# Patient Record
Sex: MALE | Race: WHITE | Employment: FULL TIME | URBAN - METROPOLITAN AREA
[De-identification: names, ages, dates, MRNs, and addresses within clinical notes are randomized per-mention and may not be internally consistent; named-entity substitution may affect disease eponyms.]

---

## 2021-09-23 ENCOUNTER — OFFICE VISIT (OUTPATIENT)
Dept: PRIMARY CARE CLINIC | Age: 61
End: 2021-09-23
Payer: COMMERCIAL

## 2021-09-23 VITALS
OXYGEN SATURATION: 97 % | TEMPERATURE: 98.3 F | HEIGHT: 73 IN | BODY MASS INDEX: 22.53 KG/M2 | WEIGHT: 170 LBS | SYSTOLIC BLOOD PRESSURE: 164 MMHG | HEART RATE: 72 BPM | DIASTOLIC BLOOD PRESSURE: 94 MMHG

## 2021-09-23 DIAGNOSIS — E11.9 CONTROLLED TYPE 2 DIABETES MELLITUS WITHOUT COMPLICATION, WITHOUT LONG-TERM CURRENT USE OF INSULIN (HCC): ICD-10-CM

## 2021-09-23 DIAGNOSIS — Z00.00 PREVENTATIVE HEALTH CARE: ICD-10-CM

## 2021-09-23 DIAGNOSIS — M79.671 PAIN IN BOTH FEET: Primary | ICD-10-CM

## 2021-09-23 DIAGNOSIS — M79.672 PAIN IN BOTH FEET: Primary | ICD-10-CM

## 2021-09-23 DIAGNOSIS — Z12.5 PROSTATE CANCER SCREENING: ICD-10-CM

## 2021-09-23 DIAGNOSIS — E78.5 HYPERLIPIDEMIA, UNSPECIFIED HYPERLIPIDEMIA TYPE: ICD-10-CM

## 2021-09-23 PROCEDURE — 99203 OFFICE O/P NEW LOW 30 MIN: CPT | Performed by: INTERNAL MEDICINE

## 2021-09-23 RX ORDER — METFORMIN HYDROCHLORIDE 500 MG/1
500 TABLET, EXTENDED RELEASE ORAL 2 TIMES DAILY
Qty: 60 TABLET | Refills: 5 | Status: SHIPPED | OUTPATIENT
Start: 2021-09-23 | End: 2021-10-20

## 2021-09-23 RX ORDER — GABAPENTIN 100 MG/1
100 CAPSULE ORAL 3 TIMES DAILY
Qty: 90 CAPSULE | Refills: 2 | Status: SHIPPED | OUTPATIENT
Start: 2021-09-23 | End: 2022-01-04 | Stop reason: SDUPTHER

## 2021-09-23 SDOH — ECONOMIC STABILITY: FOOD INSECURITY: WITHIN THE PAST 12 MONTHS, THE FOOD YOU BOUGHT JUST DIDN'T LAST AND YOU DIDN'T HAVE MONEY TO GET MORE.: NEVER TRUE

## 2021-09-23 SDOH — ECONOMIC STABILITY: TRANSPORTATION INSECURITY
IN THE PAST 12 MONTHS, HAS THE LACK OF TRANSPORTATION KEPT YOU FROM MEDICAL APPOINTMENTS OR FROM GETTING MEDICATIONS?: NO

## 2021-09-23 SDOH — ECONOMIC STABILITY: FOOD INSECURITY: WITHIN THE PAST 12 MONTHS, YOU WORRIED THAT YOUR FOOD WOULD RUN OUT BEFORE YOU GOT MONEY TO BUY MORE.: NEVER TRUE

## 2021-09-23 SDOH — ECONOMIC STABILITY: TRANSPORTATION INSECURITY
IN THE PAST 12 MONTHS, HAS LACK OF TRANSPORTATION KEPT YOU FROM MEETINGS, WORK, OR FROM GETTING THINGS NEEDED FOR DAILY LIVING?: NO

## 2021-09-23 ASSESSMENT — PATIENT HEALTH QUESTIONNAIRE - PHQ9
2. FEELING DOWN, DEPRESSED OR HOPELESS: 0
SUM OF ALL RESPONSES TO PHQ QUESTIONS 1-9: 0
SUM OF ALL RESPONSES TO PHQ9 QUESTIONS 1 & 2: 0
1. LITTLE INTEREST OR PLEASURE IN DOING THINGS: 0
SUM OF ALL RESPONSES TO PHQ QUESTIONS 1-9: 0
SUM OF ALL RESPONSES TO PHQ QUESTIONS 1-9: 0

## 2021-09-23 ASSESSMENT — ENCOUNTER SYMPTOMS
BLURRED VISION: 0
TROUBLE SWALLOWING: 0
VOMITING: 0
CHOKING: 0
VOICE CHANGE: 0
SHORTNESS OF BREATH: 0
NAUSEA: 0
PHOTOPHOBIA: 0

## 2021-09-23 ASSESSMENT — SOCIAL DETERMINANTS OF HEALTH (SDOH): HOW HARD IS IT FOR YOU TO PAY FOR THE VERY BASICS LIKE FOOD, HOUSING, MEDICAL CARE, AND HEATING?: NOT HARD AT ALL

## 2021-09-23 NOTE — PROGRESS NOTES
Bon Clark 63 y.o. male presents today with   Chief Complaint   Patient presents with    New Patient    Diabetes    Peripheral Neuropathy     feet       Diabetes  He presents for his follow-up diabetic visit. He has type 2 diabetes mellitus. His disease course has been stable. Pertinent negatives for hypoglycemia include no confusion or tremors. Pertinent negatives for diabetes include no blurred vision, no chest pain, no fatigue and no foot ulcerations. Pertinent negatives for hypoglycemia complications include no blackouts. Symptoms are stable. Foot Pain   The pain is present in the left foot and right foot. This is a recurrent problem. The current episode started more than 1 year ago. The problem occurs daily. The problem has been waxing and waning. The quality of the pain is described as aching. The pain is at a severity of 4/10. The pain is moderate. Associated symptoms include numbness and stiffness. Pertinent negatives include no fever. on no meds x 5 years. No insurance. Past Medical History:   Diagnosis Date    Hypertension     Kidney stone     Neuropathy     Type 2 diabetes mellitus without complication ([facility] [address]      There are no problems to display for this patient.     Past Surgical History:   Procedure Laterality Date    ACHILLES TENDON SURGERY Right     KNEE SURGERY Left     TONSILLECTOMY      WRIST TENODESIS Left      Family History   Problem Relation Age of Onset    Heart Disease Mother     Heart Attack Mother      Social History     Socioeconomic History    Marital status:      Spouse name: None    Number of children: None    Years of education: None    Highest education level: None   Occupational History    None   Tobacco Use    Smoking status: Current Every Day Smoker     Types: Cigarettes    Smokeless tobacco: Never Used   Substance and Sexual Activity    Alcohol use: Yes     Comment: 3 beers per wk    Drug use: Yes     Types: Marijuana    Sexual activity: None   Other Topics Concern    None   Social History Narrative    None     Social Determinants of Health     Financial Resource Strain: Low Risk     Difficulty of Paying Living Expenses: Not hard at all   Food Insecurity: No Food Insecurity    Worried About Running Out of Food in the Last Year: Never true    Coty of Food in the Last Year: Never true   Transportation Needs: No Transportation Needs    Lack of Transportation (Medical): No    Lack of Transportation (Non-Medical): No   Physical Activity:     Days of Exercise per Week:     Minutes of Exercise per Session:    Stress:     Feeling of Stress :    Social Connections:     Frequency of Communication with Friends and Family:     Frequency of Social Gatherings with Friends and Family:     Attends Anabaptism Services:     Active Member of Clubs or Organizations:     Attends Club or Organization Meetings:     Marital Status:    Intimate Partner Violence:     Fear of Current or Ex-Partner:     Emotionally Abused:     Physically Abused:     Sexually Abused:      No Known Allergies    Review of Systems   Constitutional: Negative for fatigue and fever. HENT: Negative for trouble swallowing and voice change. Eyes: Negative for blurred vision, photophobia and visual disturbance. Respiratory: Negative for choking and shortness of breath. Cardiovascular: Negative for chest pain and palpitations. Gastrointestinal: Negative for nausea and vomiting. Genitourinary: Negative for decreased urine volume and urgency. Musculoskeletal: Positive for stiffness. Negative for arthralgias. Skin: Negative for rash. Neurological: Positive for numbness. Negative for tremors and syncope. Hematological: Does not bruise/bleed easily. Psychiatric/Behavioral: Negative for confusion and suicidal ideas.            Vitals:    09/23/21 1014   BP: (!) 164/94   Site: Left Upper Arm   Cuff Size: Large Adult   Pulse: 72   Temp: 98.3 °F (36.8 °C)   SpO2: 97% Weight: 170 lb (77.1 kg)   Height: 6' 1\" (1.854 m)       Physical Exam  Constitutional:       Appearance: He is well-developed. HENT:      Head: Normocephalic and atraumatic. Eyes:      Conjunctiva/sclera: Conjunctivae normal.      Pupils: Pupils are equal, round, and reactive to light. Cardiovascular:      Rate and Rhythm: Normal rate and regular rhythm. Pulmonary:      Effort: Pulmonary effort is normal. No respiratory distress. Breath sounds: No wheezing. Abdominal:      General: There is no distension. Tenderness: There is no abdominal tenderness. Musculoskeletal:         General: Normal range of motion. Cervical back: Normal range of motion. Skin:     General: Skin is dry. Neurological:      Mental Status: He is alert. Assessment/Plan  Romel was seen today for new patient, diabetes and peripheral neuropathy. Diagnoses and all orders for this visit:    Pain in both feet  -     CBC With Auto Differential; Future  -     Vitamin B12; Future  -     Jammie - Katelin Renteria DPM, Podiatry, [place]  -     gabapentin (NEURONTIN) 100 MG capsule; Take 1 capsule by mouth 3 times daily for 90 days. Controlled type 2 diabetes mellitus without complication, without long-term current use of insulin (HCC)  -     Comprehensive Metabolic Panel; Future  -     Hemoglobin A1C; Future  -     Microalbumin / Creatinine Urine Ratio; Future  -     metFORMIN (GLUCOPHAGE-XR) 500 MG extended release tablet; Take 1 tablet by mouth 2 times daily  -     Jai Mobley MD, Endocrinology, [facility]  -     Lipid, Fasting; Future  -     Comprehensive Metabolic Panel; Future  -     CBC With Auto Differential; Future  -     Hemoglobin A1C; Future  -     PSA Screening; Future    Hyperlipidemia, unspecified hyperlipidemia type  -     Lipid, Fasting; Future    Prostate cancer screening  -     PSA Screening;  Future        Return in about 3 months (around 12/23/2021).     Addie Lopez MD

## 2021-10-01 DIAGNOSIS — E78.2 MIXED HYPERLIPIDEMIA: Primary | ICD-10-CM

## 2021-10-01 DIAGNOSIS — E08.65 DIABETES MELLITUS DUE TO UNDERLYING CONDITION, UNCONTROLLED, WITH HYPERGLYCEMIA (HCC): ICD-10-CM

## 2021-10-01 RX ORDER — ATORVASTATIN CALCIUM 20 MG/1
20 TABLET, FILM COATED ORAL DAILY
Qty: 30 TABLET | Refills: 5 | Status: SHIPPED | OUTPATIENT
Start: 2021-10-01 | End: 2022-01-28

## 2021-10-20 ENCOUNTER — OFFICE VISIT (OUTPATIENT)
Dept: ENDOCRINOLOGY | Age: 61
End: 2021-10-20
Payer: COMMERCIAL

## 2021-10-20 VITALS
WEIGHT: 170 LBS | BODY MASS INDEX: 22.53 KG/M2 | SYSTOLIC BLOOD PRESSURE: 139 MMHG | HEART RATE: 55 BPM | HEIGHT: 73 IN | OXYGEN SATURATION: 97 % | DIASTOLIC BLOOD PRESSURE: 87 MMHG

## 2021-10-20 DIAGNOSIS — E78.5 HYPERLIPIDEMIA, UNSPECIFIED HYPERLIPIDEMIA TYPE: ICD-10-CM

## 2021-10-20 DIAGNOSIS — E11.42 DIABETIC POLYNEUROPATHY ASSOCIATED WITH TYPE 2 DIABETES MELLITUS (HCC): ICD-10-CM

## 2021-10-20 DIAGNOSIS — E11.69 TYPE 2 DIABETES MELLITUS WITH OTHER SPECIFIED COMPLICATION, WITH LONG-TERM CURRENT USE OF INSULIN (HCC): Primary | ICD-10-CM

## 2021-10-20 DIAGNOSIS — Z79.4 TYPE 2 DIABETES MELLITUS WITH OTHER SPECIFIED COMPLICATION, WITH LONG-TERM CURRENT USE OF INSULIN (HCC): Primary | ICD-10-CM

## 2021-10-20 LAB
CHP ED QC CHECK: NORMAL
GLUCOSE BLD-MCNC: 215 MG/DL

## 2021-10-20 PROCEDURE — 82962 GLUCOSE BLOOD TEST: CPT | Performed by: INTERNAL MEDICINE

## 2021-10-20 PROCEDURE — 99243 OFF/OP CNSLTJ NEW/EST LOW 30: CPT | Performed by: INTERNAL MEDICINE

## 2021-10-20 RX ORDER — LANCETS 33 GAUGE
EACH MISCELLANEOUS
Qty: 100 EACH | Refills: 5 | Status: SHIPPED | OUTPATIENT
Start: 2021-10-20 | End: 2021-12-14 | Stop reason: SDUPTHER

## 2021-10-20 RX ORDER — BLOOD SUGAR DIAGNOSTIC
1 STRIP MISCELLANEOUS 3 TIMES DAILY
Qty: 100 EACH | Refills: 3 | Status: SHIPPED | OUTPATIENT
Start: 2021-10-20 | End: 2022-02-24

## 2021-10-20 RX ORDER — BLOOD-GLUCOSE METER
EACH MISCELLANEOUS
Qty: 1 KIT | Refills: 0 | Status: SHIPPED | OUTPATIENT
Start: 2021-10-20

## 2021-10-20 RX ORDER — LISINOPRIL 5 MG/1
5 TABLET ORAL DAILY
Qty: 90 TABLET | Refills: 1 | Status: SHIPPED | OUTPATIENT
Start: 2021-10-20 | End: 2022-01-04 | Stop reason: SDUPTHER

## 2021-10-20 RX ORDER — LANCETS 30 GAUGE
1 EACH MISCELLANEOUS DAILY
Qty: 100 EACH | Refills: 3 | Status: CANCELLED | OUTPATIENT
Start: 2021-10-20

## 2021-10-20 RX ORDER — BLOOD-GLUCOSE METER
1 EACH MISCELLANEOUS DAILY
Qty: 1 KIT | Refills: 0 | Status: CANCELLED | OUTPATIENT
Start: 2021-10-20

## 2021-10-20 RX ORDER — ICOSAPENT ETHYL 1000 MG/1
2 CAPSULE ORAL 2 TIMES DAILY
Qty: 120 CAPSULE | Refills: 3 | Status: SHIPPED | OUTPATIENT
Start: 2021-10-20 | End: 2021-11-17 | Stop reason: SDUPTHER

## 2021-10-20 RX ORDER — INSULIN LISPRO 100 [IU]/ML
INJECTION, SUSPENSION SUBCUTANEOUS
Qty: 10 PEN | Refills: 3 | Status: SHIPPED | OUTPATIENT
Start: 2021-10-20 | End: 2021-11-17 | Stop reason: SDUPTHER

## 2021-10-20 NOTE — PROGRESS NOTES
10/20/2021    Assessment:       Diagnosis Orders   1. Type 2 diabetes mellitus with other specified complication, with long-term current use of insulin ([facility])  POCT Glucose    C-Peptide    Glutamic Acid Decarboxylase    Anti-Islet Cell Antibody    Lipid Panel    Basic Metabolic Panel   2. Diabetic polyneuropathy associated with type 2 diabetes mellitus ([facility] [address]     3. Hyperlipidemia, unspecified hyperlipidemia type           PLAN:     Orders Placed This Encounter   Procedures    C-Peptide     Standing Status:   Future     Standing Expiration Date:   10/20/2022    Glutamic Acid Decarboxylase     Standing Status:   Future     Standing Expiration Date:   10/20/2022    Anti-Islet Cell Antibody     Standing Status:   Future     Standing Expiration Date:   10/20/2022    Lipid Panel     Standing Status:   Future     Standing Expiration Date:   10/20/2022     Order Specific Question:   Is Patient Fasting?/# of Hours     Answer:   y    Basic Metabolic Panel     Standing Status:   Future     Standing Expiration Date:   10/20/2022    POCT Glucose     Medications Discontinued During This Encounter   Medication Reason    metFORMIN (GLUCOPHAGE-XR) 500 MG extended release tablet LIST CLEANUP    metFORMIN (GLUCOPHAGE) 1000 MG tablet Therapy completed       Start on 75/25 Humalog 14 units twice a day  2 weeks freestyle gladys  Discontinue Metformin  Add Vascepa to Lipitor  Discussed A1c goal of 7 or lower  More than 50% of 40 minutes spent patient education counseling  We will screen for type 1 diabetes  Thank you for the referral  Diabetes education provided today:    Continuous Glucose monitor. How it works and checks blood sugars every 5 min. for 4 days during our tests. Managing high and low sugar readings. Rotation of sites for subcutaneous medication injection.     Orders Placed This Encounter   Medications    insulin lispro protamine & lispro (HUMALOG MIX 75/25 KWIKPEN) (75-25) 100 UNIT per ML SUPN injection pen     Sig: 14 units twice day     Dispense:  10 pen     Refill:  3    blood glucose test strips (ONETOUCH VERIO) strip     Si each by In Vitro route 3 times daily As needed. Dispense:  100 each     Refill:  3    OneTouch Delica Lancets 71Y MISC     Sig: tid     Dispense:  100 each     Refill:  5    Blood Glucose Monitoring Suppl (Shiv Moreno) w/Device KIT     Sig: As directed     Dispense:  1 kit     Refill:  0    lisinopril (PRINIVIL;ZESTRIL) 5 MG tablet     Sig: Take 1 tablet by mouth daily     Dispense:  90 tablet     Refill:  1    Icosapent Ethyl (VASCEPA) 1 g CAPS capsule     Sig: Take 2 capsules by mouth 2 times daily     Dispense:  120 capsule     Refill:  3    Insulin Pen Needle 32G X 4 MM MISC     Si each by Does not apply route 2 times daily     Dispense:  100 each     Refill:  3           Orders Placed This Encounter   Procedures    POCT Glucose       Subjective:     Chief Complaint   Patient presents with    New Patient    Diabetes     Vitals:    10/20/21 1338   BP: 139/87   Pulse: 55   SpO2: 97%   Weight: 170 lb (77.1 kg)   Height: 6' 1\" (1.854 m)     Wt Readings from Last 3 Encounters:   10/20/21 170 lb (77.1 kg)   21 170 lb (77.1 kg)     BP Readings from Last 3 Encounters:   10/20/21 139/87   21 (!) 164/94     Patient referred here for uncontrolled diabetes has had diabetes for over 30 years in between was not taking medications for his diabetes due to insurance reasons blood sugars have been overall above 200 does have neuropathy in his feet history of hypercholesterolemia hypertriglyceridemia currently on Metformin is having GI side effects including diarrhea    Diabetes  He presents for his initial diabetic visit. He has type 2 diabetes mellitus. His disease course has been fluctuating. There are no hypoglycemic associated symptoms. Associated symptoms include fatigue, foot paresthesias, polydipsia, polyuria and weight loss.  Diabetic complications include peripheral neuropathy. Current diabetic treatment includes oral agent (monotherapy) (Metformin). His overall blood glucose range is >200 mg/dl. (Lab Results       Component                Value               Date                       LABA1C                   10.3 (H)            09/23/2021              ) An ACE inhibitor/angiotensin II receptor blocker is being taken. Hyperlipidemia  This is a new problem. The current episode started more than 1 year ago. The problem is uncontrolled. Recent lipid tests were reviewed and are variable. Current antihyperlipidemic treatment includes statins. Risk factors for coronary artery disease include diabetes mellitus and dyslipidemia.      Past Medical History:   Diagnosis Date    Hypertension     Kidney stone     Neuropathy     Type 2 diabetes mellitus without complication (HCC)      Past Surgical History:   Procedure Laterality Date    ACHILLES TENDON SURGERY Right     KNEE SURGERY Left     TONSILLECTOMY      WRIST TENODESIS Left      Social History     Socioeconomic History    Marital status:      Spouse name: Not on file    Number of children: Not on file    Years of education: Not on file    Highest education level: Not on file   Occupational History    Not on file   Tobacco Use    Smoking status: Former Smoker     Types: Cigarettes    Smokeless tobacco: Never Used   Substance and Sexual Activity    Alcohol use: Yes     Comment: 3 beers per wk    Drug use: Yes     Types: Marijuana    Sexual activity: Not on file   Other Topics Concern    Not on file   Social History Narrative    Not on file     Social Determinants of Health     Financial Resource Strain: Low Risk     Difficulty of Paying Living Expenses: Not hard at all   Food Insecurity: No Food Insecurity    Worried About Running Out of Food in the Last Year: Never true    Coty of Food in the Last Year: Never true   Transportation Needs: No Transportation Needs    Lack of Transportation (Medical): No    Lack of Transportation (Non-Medical): No   Physical Activity:     Days of Exercise per Week:     Minutes of Exercise per Session:    Stress:     Feeling of Stress :    Social Connections:     Frequency of Communication with Friends and Family:     Frequency of Social Gatherings with Friends and Family:     Attends Christianity Services:     Active Member of Clubs or Organizations:     Attends Club or Organization Meetings:     Marital Status:    Intimate Partner Violence:     Fear of Current or Ex-Partner:     Emotionally Abused:     Physically Abused:     Sexually Abused:      Family History   Problem Relation Age of Onset    Heart Disease Mother     Heart Attack Mother      No Known Allergies    Current Outpatient Medications:     atorvastatin (LIPITOR) 20 MG tablet, Take 1 tablet by mouth daily, Disp: 30 tablet, Rfl: 5    metFORMIN (GLUCOPHAGE) 1000 MG tablet, Take 1 tablet by mouth 2 times daily (with meals), Disp: 60 tablet, Rfl: 5    gabapentin (NEURONTIN) 100 MG capsule, Take 1 capsule by mouth 3 times daily for 90 days. , Disp: 90 capsule, Rfl: 2  Lab Results   Component Value Date     (L) 09/23/2021    K 4.3 09/23/2021    CL 97 09/23/2021    CO2 20 09/23/2021    BUN 11 09/23/2021    CREATININE 0.59 (L) 09/23/2021    GLUCOSE 215 10/20/2021    CALCIUM 9.3 09/23/2021    PROT 6.8 09/23/2021    LABALBU 4.6 09/23/2021    BILITOT 1.0 (H) 09/23/2021    ALKPHOS 93 09/23/2021    AST <5 09/23/2021    ALT <5 09/23/2021    LABGLOM >60.0 09/23/2021    GFRAA >60.0 09/23/2021    GLOB 2.2 (L) 09/23/2021     Lab Results   Component Value Date    WBC 5.9 09/23/2021    HGB 17.2 09/23/2021    HCT 48.4 09/23/2021    MCV 88.7 09/23/2021     09/23/2021     Lab Results   Component Value Date    LABA1C 10.3 (H) 09/23/2021     Lab Results   Component Value Date    CHOLFAST 285 (H) 09/23/2021    TRIGLYCFAST 1,580 (H) 09/23/2021    HDL 27 (L) 09/23/2021    LDLCALC see below 09/23/2021       Review of Systems   Constitutional: Positive for fatigue, unexpected weight change and weight loss. Cardiovascular: Negative. Gastrointestinal: Positive for diarrhea. Endocrine: Positive for polydipsia and polyuria. Musculoskeletal: Negative. Psychiatric/Behavioral: Negative. All other systems reviewed and are negative. Objective:   Physical Exam  Vitals reviewed. Constitutional:       General: He is not in acute distress. Appearance: Normal appearance. He is normal weight. HENT:      Head: Normocephalic and atraumatic. Hair is normal.      Right Ear: External ear normal.      Left Ear: External ear normal.      Nose: Nose normal.      Mouth/Throat:      Mouth: Mucous membranes are moist.   Eyes:      General: No scleral icterus. Right eye: No discharge. Left eye: No discharge. Extraocular Movements: Extraocular movements intact. Conjunctiva/sclera: Conjunctivae normal.   Neck:      Trachea: Trachea normal.   Cardiovascular:      Rate and Rhythm: Normal rate and regular rhythm. Pulses: Normal pulses. Heart sounds: Normal heart sounds. Pulmonary:      Effort: Pulmonary effort is normal.      Breath sounds: Normal breath sounds. No wheezing or rhonchi. Abdominal:      Palpations: Abdomen is soft. Musculoskeletal:         General: Normal range of motion. Cervical back: Normal range of motion and neck supple. Feet:    Skin:     General: Skin is warm. Neurological:      General: No focal deficit present. Mental Status: He is alert and oriented to person, place, and time.    Psychiatric:         Mood and Affect: Mood normal.         Behavior: Behavior normal.

## 2021-10-21 ENCOUNTER — TELEPHONE (OUTPATIENT)
Dept: ENDOCRINOLOGY | Age: 61
End: 2021-10-21

## 2021-10-21 RX ORDER — HUMAN INSULIN 100 [IU]/ML
INJECTION, SUSPENSION SUBCUTANEOUS
Qty: 10 PEN | Refills: 3 | Status: SHIPPED | OUTPATIENT
Start: 2021-10-21 | End: 2022-12-15 | Stop reason: SDUPTHER

## 2021-10-21 ASSESSMENT — ENCOUNTER SYMPTOMS: DIARRHEA: 1

## 2021-10-21 NOTE — TELEPHONE ENCOUNTER
Patient is calling because the Humalog is too expensive and he is requesting an alternative medication. Can we send a generic to his pharmacy? Thanks!

## 2021-10-29 ENCOUNTER — INITIAL CONSULT (OUTPATIENT)
Dept: PODIATRY | Age: 61
End: 2021-10-29
Payer: COMMERCIAL

## 2021-10-29 VITALS — BODY MASS INDEX: 21.87 KG/M2 | TEMPERATURE: 97.4 F | WEIGHT: 165 LBS | HEIGHT: 73 IN

## 2021-10-29 DIAGNOSIS — M79.671 PAIN IN BOTH FEET: Primary | ICD-10-CM

## 2021-10-29 DIAGNOSIS — M20.42 HAMMERTOE, BILATERAL: ICD-10-CM

## 2021-10-29 DIAGNOSIS — E11.42 DIABETIC POLYNEUROPATHY ASSOCIATED WITH TYPE 2 DIABETES MELLITUS (HCC): ICD-10-CM

## 2021-10-29 DIAGNOSIS — M20.5X9 ACQUIRED ADDUCTOVARUS ROTATION OF TOE, UNSPECIFIED LATERALITY: ICD-10-CM

## 2021-10-29 DIAGNOSIS — M20.41 HAMMERTOE, BILATERAL: ICD-10-CM

## 2021-10-29 DIAGNOSIS — M79.672 PAIN IN BOTH FEET: Primary | ICD-10-CM

## 2021-10-29 PROCEDURE — 99203 OFFICE O/P NEW LOW 30 MIN: CPT | Performed by: PODIATRIST

## 2021-10-29 ASSESSMENT — ENCOUNTER SYMPTOMS
BACK PAIN: 0
SHORTNESS OF BREATH: 0
VOMITING: 0
NAUSEA: 0

## 2021-10-29 NOTE — PROGRESS NOTES
SHAILESH/ [address]  [address]  Dept: [phone]  Loc: [phone]       Howard Martinez  (1960)    10/29/21    Annetta Bliss is 63 y.o. male who complains today of:    Chief Complaint   Patient presents with    Foot Pain     both feet    Diabetes       Howard Martinez is seen in consultation at the request of Beba Zhang MD for evaluation of foot pain. HPI: Patient presents for diabetic foot exam.  Patient complains of numbness, tingling, and burning sensations to the toes and odd sensations to the forefoot/ ball of the foot. Patient states he began having these symptoms approximately 1 year ago and they have worsened over time. Patient rates his current pain at 6/10. Treatment has included controlling his diabetes and gabapentin, currently he takes 100 mg 3 times per day. Patient states that he has not tried a topical neuropathy pain cream in the past.  Patient checks his blood glucose daily, he last checked it this morning it was 291 mg/dL. The patient's most recent hemoglobin A1c was 10.3%. Patient denies a history of diabetic foot ulceration. Patient denies symptoms consistent with intermittent claudication but he does complain of calf cramping at night, this happens approximately 4-5 times per week. Patient does not currently wear diabetic shoes. Review of Systems   Constitutional: Negative for chills and fever. HENT: Negative for hearing loss. Respiratory: Negative for shortness of breath. Cardiovascular: Negative for chest pain. Gastrointestinal: Negative for nausea and vomiting. Genitourinary: Negative for difficulty urinating. Musculoskeletal: Negative for back pain and gait problem. Skin: Negative for wound. Neurological: Positive for numbness. Hematological: Does not bruise/bleed easily.    Psychiatric/Behavioral: Negative for sleep disturbance. The patient is a diabetic. Endocrinologist: Dr. Eleazar Manrique   Date last seen: 10/20/2021    Allergies:  Patient has no known allergies. Current Outpatient Medications on File Prior to Visit   Medication Sig Dispense Refill    Insulin NPH Isophane & Regular (NOVOLIN 70/30 FLEXPEN) (70-30) 100 UNIT per ML injection pen 14 units twice daily 10 pen 3    insulin lispro protamine & lispro (HUMALOG MIX 75/25 KWIKPEN) (75-25) 100 UNIT per ML SUPN injection pen 14 units twice day 10 pen 3    blood glucose test strips (ONETOUCH VERIO) strip 1 each by In Vitro route 3 times daily As needed. 100 each 3    OneTouch Delica Lancets 03L MISC tid 100 each 5    Blood Glucose Monitoring Suppl (ONETOUCH VERIO) w/Device KIT As directed 1 kit 0    lisinopril (PRINIVIL;ZESTRIL) 5 MG tablet Take 1 tablet by mouth daily 90 tablet 1    Icosapent Ethyl (VASCEPA) 1 g CAPS capsule Take 2 capsules by mouth 2 times daily 120 capsule 3    Insulin Pen Needle 32G X 4 MM MISC 1 each by Does not apply route 2 times daily 100 each 3    atorvastatin (LIPITOR) 20 MG tablet Take 1 tablet by mouth daily 30 tablet 5    gabapentin (NEURONTIN) 100 MG capsule Take 1 capsule by mouth 3 times daily for 90 days. 90 capsule 2     No current facility-administered medications on file prior to visit.        Past Medical History:   Diagnosis Date    Hypertension     Kidney stone     Neuropathy     Type 2 diabetes mellitus without complication (HCC)      Past Surgical History:   Procedure Laterality Date    ACHILLES TENDON SURGERY Right     KNEE SURGERY Left     TONSILLECTOMY      WRIST TENODESIS Left      Social History     Socioeconomic History    Marital status:      Spouse name: Not on file    Number of children: Not on file    Years of education: Not on file    Highest education level: Not on file   Occupational History    Not on file   Tobacco Use    Smoking status: Former Smoker     Types: Cigarettes    Smokeless tobacco: Never Used   Substance and Sexual Activity    Alcohol use: Yes     Comment: 3 beers per wk    Drug use: Yes     Types: Marijuana    Sexual activity: Not on file   Other Topics Concern    Not on file   Social History Narrative    Not on file     Social Determinants of Health     Financial Resource Strain: Low Risk     Difficulty of Paying Living Expenses: Not hard at all   Food Insecurity: No Food Insecurity    Worried About Running Out of Food in the Last Year: Never true    Coty of Food in the Last Year: Never true   Transportation Needs: No Transportation Needs    Lack of Transportation (Medical): No    Lack of Transportation (Non-Medical): No   Physical Activity:     Days of Exercise per Week:     Minutes of Exercise per Session:    Stress:     Feeling of Stress :    Social Connections:     Frequency of Communication with Friends and Family:     Frequency of Social Gatherings with Friends and Family:     Attends Sabianist Services:     Active Member of Clubs or Organizations:     Attends Club or Organization Meetings:     Marital Status:    Intimate Partner Violence:     Fear of Current or Ex-Partner:     Emotionally Abused:     Physically Abused:     Sexually Abused:      Family History   Problem Relation Age of Onset    Heart Disease Mother     Heart Attack Mother            Objective:   Vitals:  Temp 97.4 °F (36.3 °C)   Ht 6' 1\" (1.854 m)   Wt 165 lb (74.8 kg)   BMI 21.77 kg/m² Pain Score:   6      Physical Exam  Constitutional:       Appearance: Normal appearance. He is normal weight. HENT:      Head: Normocephalic and atraumatic. Cardiovascular:      Pulses:           Dorsalis pedis pulses are 2+ on the right side and 2+ on the left side. Posterior tibial pulses are 2+ on the right side and 2+ on the left side. Comments: Skin temperature gradient is noted to be warm to warm from proximal tibia to distal toes bilaterally.   Hair growth is noted bilateral lower extremity. No varicosities or telangiectasia noted bilaterally. No signs of ischemia or venous stasis observed. Pulmonary:      Effort: Pulmonary effort is normal.   Musculoskeletal:      Right lower leg: No edema. Left lower leg: No edema. Right foot: Deformity present. Left foot: Deformity present. Feet:      Right foot:      Protective Sensation: 10 sites tested. 10 sites sensed. Skin integrity: No ulcer or callus. Toenail Condition: Right toenails are normal.      Left foot:      Protective Sensation: 10 sites tested. 10 sites sensed. Skin integrity: No ulcer or callus. Toenail Condition: Left toenails are normal.      Comments: Rectus foot type noted bilaterally. Manual muscle testing is graded at 5/5 for all extrinsic foot muscle groups bilaterally. Adductovarus rotation deformity noted to the fifth toe bilaterally. Semirigid hammertoe deformity noted to the second PIPJ bilaterally. There is thickening of the distal one third of the right Achilles tendon, no dell or appreciable rupture noted. Decreased ankle joint dorsiflexion noted bilaterally, right worse than left, this improves with knee flexion. Lymphadenopathy:      Comments: Popliteal lymph nodes are soft and nontender bilateral lower extremity. Skin:     General: Skin is warm and dry. Capillary Refill: Capillary refill takes less than 2 seconds. Comments: No open lesions noted bilateral foot. Well-healed surgical cicatrix noted to the posterior right lower leg and heel. Neurological:      Mental Status: He is alert and oriented to person, place, and time. Motor: Motor function is intact. Coordination: Coordination is intact. Gait: Gait is intact. Deep Tendon Reflexes: Babinski sign absent on the right side. Babinski sign absent on the left side. Reflex Scores:       Patellar reflexes are 2+ on the right side and 2+ on the left side.        Achilles reflexes are 2+ on the right side and 2+ on the left side. Comments: Light touch sensation is intact but altered to the digits bilaterally. Protective sensation as tested with a 10 g semi-Alan monofilament was intact bilaterally. No ankle clonus noted bilaterally. Hot versus cold discrimination is intact bilaterally. Sharp versus dull discrimination is intact bilaterally. Psychiatric:         Mood and Affect: Mood normal.         Behavior: Behavior normal.         Assessment:      Diagnosis Orders   1. Pain in both feet     2. Diabetic polyneuropathy associated with type 2 diabetes mellitus ([facility] [address]     3. Hammertoe, bilateral     4. Acquired adductovarus rotation of toe, unspecified laterality         Plan:     Diabetes mellitus: I discussed the \"do's and donts\" of diabetes mellitus and diabetic foot care. The patient should adhere to the random glucose monitoring schedule according to their internist/endocrinologist and report any significant fluctuations or problems to them immediately. I emphasized the importance of daily foot checks and applying a moisturizing cream to the feet daily, but not in between the toes. If any ulcerations or signs and symptoms of infection arise, the patient is to call and return immediately for evaluation. Neuropathic pain: I have recommended over-the-counter topical pain creams (Voltaren and lidocaine), apply as directed. If these are ineffective I have recommended he try topical capsaicin. All questions were answered to the patient's satisfaction. Thank you for allowing me to participate in the care of your patient. Follow up:  Return in about 6 months (around 4/29/2022). Gaby Richardson DPM      Level of medical decision making: low. Please note that this report has been partially produced using speech recognition software which may cause errors including grammar, punctuation, and spelling or words and phrases that may seem inappropriate.  If there are questions or concerns please feel free to contact me for clarification.

## 2021-11-17 RX ORDER — ICOSAPENT ETHYL 1000 MG/1
2 CAPSULE ORAL 2 TIMES DAILY
Qty: 120 CAPSULE | Refills: 3 | Status: SHIPPED | OUTPATIENT
Start: 2021-11-17 | End: 2021-12-17 | Stop reason: SDUPTHER

## 2021-11-17 RX ORDER — INSULIN LISPRO 100 [IU]/ML
INJECTION, SUSPENSION SUBCUTANEOUS
Qty: 10 PEN | Refills: 3 | Status: SHIPPED | OUTPATIENT
Start: 2021-11-17

## 2021-12-10 RX ORDER — INSULIN HUMAN 100 [IU]/ML
INJECTION, SUSPENSION SUBCUTANEOUS
Qty: 10 PEN | Refills: 3 | Status: SHIPPED | OUTPATIENT
Start: 2021-12-10

## 2021-12-14 RX ORDER — VITAMIN E ACET./SAFFLOWER OIL
OIL (ML) TOPICAL
Refills: 0 | Status: CANCELLED | OUTPATIENT
Start: 2021-12-14

## 2021-12-14 RX ORDER — LANCETS 33 GAUGE
EACH MISCELLANEOUS
Qty: 100 EACH | Refills: 5 | Status: SHIPPED | OUTPATIENT
Start: 2021-12-14

## 2021-12-14 RX ORDER — LANCETS 33 GAUGE
EACH MISCELLANEOUS
Qty: 100 EACH | Refills: 5 | Status: CANCELLED | OUTPATIENT
Start: 2021-12-14

## 2021-12-14 NOTE — TELEPHONE ENCOUNTER
Patient  requesting medication refill.  Please approve or deny this request.    Rx requested:  Requested Prescriptions     Pending Prescriptions Disp Refills    Insulin Pen Needle 32G X 4 MM MISC 100 each 3     Si each by Does not apply route 2 times daily    OneTouch Delica Lancets 74K MISC 100 each 5     Sig: tid    Vitamin E Beauty 21685 UNIT/52ML OIL  0     Sig: Apply topically         Last Office Visit:   10/20/2021      Next Visit Date:  Future Appointments   Date Time Provider Power Issa   2021  2:30 PM José Miguel Buck  [address]   3/24/2022  9:15 AM [facility] [place]   2022  9:00 AM ARLENE Bagley [facility] [place]

## 2021-12-14 NOTE — TELEPHONE ENCOUNTER
Patient need humlin vials 1000units that is what patient insurance will pay for. Insulin needles ordered to please.    [facility]

## 2021-12-17 ENCOUNTER — OFFICE VISIT (OUTPATIENT)
Dept: ENDOCRINOLOGY | Age: 61
End: 2021-12-17
Payer: COMMERCIAL

## 2021-12-17 VITALS
WEIGHT: 176.2 LBS | HEIGHT: 73 IN | BODY MASS INDEX: 23.35 KG/M2 | HEART RATE: 83 BPM | DIASTOLIC BLOOD PRESSURE: 96 MMHG | SYSTOLIC BLOOD PRESSURE: 148 MMHG | RESPIRATION RATE: 14 BRPM | OXYGEN SATURATION: 96 %

## 2021-12-17 DIAGNOSIS — E78.5 HYPERLIPIDEMIA, UNSPECIFIED HYPERLIPIDEMIA TYPE: ICD-10-CM

## 2021-12-17 DIAGNOSIS — Z79.4 TYPE 2 DIABETES MELLITUS WITH OTHER SPECIFIED COMPLICATION, WITH LONG-TERM CURRENT USE OF INSULIN (HCC): Primary | ICD-10-CM

## 2021-12-17 DIAGNOSIS — E11.69 TYPE 2 DIABETES MELLITUS WITH OTHER SPECIFIED COMPLICATION, WITH LONG-TERM CURRENT USE OF INSULIN (HCC): Primary | ICD-10-CM

## 2021-12-17 LAB
CHP ED QC CHECK: ABNORMAL
GLUCOSE BLD-MCNC: 297 MG/DL
HBA1C MFR BLD: 9.1 %

## 2021-12-17 PROCEDURE — 99214 OFFICE O/P EST MOD 30 MIN: CPT | Performed by: INTERNAL MEDICINE

## 2021-12-17 PROCEDURE — 82962 GLUCOSE BLOOD TEST: CPT | Performed by: INTERNAL MEDICINE

## 2021-12-17 PROCEDURE — 83036 HEMOGLOBIN GLYCOSYLATED A1C: CPT | Performed by: INTERNAL MEDICINE

## 2021-12-17 RX ORDER — PEN NEEDLE, DIABETIC 32 GX 1/6"
1 NEEDLE, DISPOSABLE MISCELLANEOUS DAILY
Qty: 100 EACH | Refills: 3 | Status: SHIPPED | OUTPATIENT
Start: 2021-12-17

## 2021-12-17 RX ORDER — HUMAN INSULIN 100 [IU]/ML
INJECTION, SUSPENSION SUBCUTANEOUS
Qty: 10 PEN | Refills: 3 | Status: SHIPPED | OUTPATIENT
Start: 2021-12-17 | End: 2022-01-13 | Stop reason: SDUPTHER

## 2021-12-17 RX ORDER — ICOSAPENT ETHYL 1000 MG/1
2 CAPSULE ORAL 2 TIMES DAILY
Qty: 120 CAPSULE | Refills: 3 | Status: SHIPPED | OUTPATIENT
Start: 2021-12-17

## 2021-12-17 ASSESSMENT — ENCOUNTER SYMPTOMS: EYES NEGATIVE: 1

## 2021-12-17 NOTE — PROGRESS NOTES
2021    Assessment:       Diagnosis Orders   1. Type 2 diabetes mellitus with other specified complication, with long-term current use of insulin (HCC)  POCT glycosylated hemoglobin (Hb A1C)    POCT Glucose    C-Peptide    Glutamic Acid Decarboxylase    Glucose, Fasting   2. Hyperlipidemia, unspecified hyperlipidemia type  Lipid Panel         PLAN:     Orders Placed This Encounter   Procedures    C-Peptide     Standing Status:   Future     Standing Expiration Date:   2022    Glutamic Acid Decarboxylase     Standing Status:   Future     Standing Expiration Date:   2022    Glucose, Fasting     Standing Status:   Future     Standing Expiration Date:   2022    Lipid Panel     Standing Status:   Future     Standing Expiration Date:   2022     Order Specific Question:   Is Patient Fasting?/# of Hours     Answer:   y    POCT glycosylated hemoglobin (Hb A1C)    POCT Glucose     Change insulin injections to 3 times daily 14 to 16 units discussed about pump therapy patient did talk to [employer] pump representative regarding pump continuous glucose monitoring A1c goal of 7 or lower  Repeat lipid panel triglyceride goal of less than 200  4050% of 30 minutes spent patient education counseling  Diabetes education provided today:    Nutrition as a mainstream of diabetes therapy. [place] about label reading. Insulin pumps, how they work and how they affect blood sugar levels. Continuous Glucose monitor. How it works and checks blood sugars every 5 min. for 4 days during our tests. Managing high and low sugar readings.     Orders Placed This Encounter   Medications    Icosapent Ethyl (VASCEPA) 1 g CAPS capsule     Sig: Take 2 capsules by mouth 2 times daily     Dispense:  120 capsule     Refill:  3    Insulin NPH Isophane & Regular (NOVOLIN 70/30 FLEXPEN RELION) (70-30) 100 UNIT per ML injection pen     Si-16 units at each meals     Dispense:  10 pen     Refill:  3    Insulin Pen Needle (NOVOFINE PLUS PEN NEEDLE) 32G X 4 MM MISC     Si each by Does not apply route daily     Dispense:  100 each     Refill:  3       Subjective:     Chief Complaint   Patient presents with    Diabetes    1 Month Follow-Up     There were no vitals filed for this visit. Wt Readings from Last 3 Encounters:   10/29/21 165 lb (74.8 kg)   10/20/21 170 lb (77.1 kg)   21 170 lb (77.1 kg)     BP Readings from Last 3 Encounters:   10/20/21 139/87   21 (!) 164/94     Follow-up on type 2 diabetes patient on Novolin 70/30 taking 14 to 16 units twice daily also had 2-week freestyle gladys reviewed blood sugars are still labile although has not been improving  Patient also concerned about high cost of insulin looking at other less expensive options    Diabetes  He presents for his follow-up diabetic visit. He has type 2 diabetes mellitus. His disease course has been fluctuating. There are no hypoglycemic complications. Symptoms are improving. There are no diabetic complications. Current diabetic treatment includes insulin injections. He is currently taking insulin pre-breakfast and pre-dinner. His overall blood glucose range is >200 mg/dl. (Reviewed 2-week freestyle gladys average blood sugar was 322  Target range was 1%  High was 14%  Very high was 85%    Lab Results       Component                Value               Date                       LABA1C                   9.1 (A)             2021              ) An ACE inhibitor/angiotensin II receptor blocker is being taken. Hyperlipidemia  This is a recurrent problem. The current episode started more than 1 year ago. The problem is uncontrolled. Exacerbating diseases include diabetes. Current antihyperlipidemic treatment includes statins (vascepa). Risk factors for coronary artery disease include diabetes mellitus and dyslipidemia.      Past Medical History:   Diagnosis Date    Hypertension     Kidney stone     Neuropathy     Type 2 diabetes mellitus without complication ([facility] [address]      Past Surgical History:   Procedure Laterality Date    ACHILLES TENDON SURGERY Right     KNEE SURGERY Left     TONSILLECTOMY      WRIST TENODESIS Left      Social History     Socioeconomic History    Marital status:      Spouse name: Not on file    Number of children: Not on file    Years of education: Not on file    Highest education level: Not on file   Occupational History    Not on file   Tobacco Use    Smoking status: Former Smoker     Types: Cigarettes    Smokeless tobacco: Never Used   Substance and Sexual Activity    Alcohol use: Yes     Comment: 3 beers per wk    Drug use: Yes     Types: Marijuana Maurita Handsome)    Sexual activity: Not on file   Other Topics Concern    Not on file   Social History Narrative    Not on file     Social Determinants of Health     Financial Resource Strain: Low Risk     Difficulty of Paying Living Expenses: Not hard at all   Food Insecurity: No Food Insecurity    Worried About Running Out of Food in the Last Year: Never true    Coty of Food in the Last Year: Never true   Transportation Needs: No Transportation Needs    Lack of Transportation (Medical): No    Lack of Transportation (Non-Medical):  No   Physical Activity:     Days of Exercise per Week: Not on file    Minutes of Exercise per Session: Not on file   Stress:     Feeling of Stress : Not on file   Social Connections:     Frequency of Communication with Friends and Family: Not on file    Frequency of Social Gatherings with Friends and Family: Not on file    Attends Gnosticist Services: Not on file    Active Member of Clubs or Organizations: Not on file    Attends Club or Organization Meetings: Not on file    Marital Status: Not on file   Intimate Partner Violence:     Fear of Current or Ex-Partner: Not on file    Emotionally Abused: Not on file    Physically Abused: Not on file    Sexually Abused: Not on file   Housing Stability:     Unable to Pay for Housing in the Last Year: Not on file    Number of Places Lived in the Last Year: Not on file    Unstable Housing in the Last Year: Not on file     Family History   Problem Relation Age of Onset    Heart Disease Mother     Heart Attack Mother      No Known Allergies    Current Outpatient Medications:     OneTouch Delica Lancets 73Q MISC, tid, Disp: 100 each, Rfl: 5    Insulin Pen Needle 32G X 4 MM MISC, 1 each by Does not apply route 2 times daily, Disp: 100 each, Rfl: 3    Insulin NPH Isophane & Regular (HUMULIN 70/30 KWIKPEN) (70-30) 100 UNIT per ML injection pen, 14 units twice daily E11.65, Disp: 10 pen, Rfl: 3    insulin lispro protamine & lispro (HUMALOG MIX 75/25 KWIKPEN) (75-25) 100 UNIT per ML SUPN injection pen, 14 units twice day, Disp: 10 pen, Rfl: 3    Icosapent Ethyl (VASCEPA) 1 g CAPS capsule, Take 2 capsules by mouth 2 times daily, Disp: 120 capsule, Rfl: 3    Insulin NPH Isophane & Regular (NOVOLIN 70/30 FLEXPEN) (70-30) 100 UNIT per ML injection pen, 14 units twice daily, Disp: 10 pen, Rfl: 3    blood glucose test strips (ONETOUCH VERIO) strip, 1 each by In Vitro route 3 times daily As needed. , Disp: 100 each, Rfl: 3    Blood Glucose Monitoring Suppl (ONETOUCH VERIO) w/Device KIT, As directed, Disp: 1 kit, Rfl: 0    lisinopril (PRINIVIL;ZESTRIL) 5 MG tablet, Take 1 tablet by mouth daily, Disp: 90 tablet, Rfl: 1    atorvastatin (LIPITOR) 20 MG tablet, Take 1 tablet by mouth daily, Disp: 30 tablet, Rfl: 5    gabapentin (NEURONTIN) 100 MG capsule, Take 1 capsule by mouth 3 times daily for 90 days. , Disp: 90 capsule, Rfl: 2  Lab Results   Component Value Date     (L) 09/23/2021    K 4.3 09/23/2021    CL 97 09/23/2021    CO2 20 09/23/2021    BUN 11 09/23/2021    CREATININE 0.59 (L) 09/23/2021    GLUCOSE 215 10/20/2021    CALCIUM 9.3 09/23/2021    PROT 6.8 09/23/2021    LABALBU 4.6 09/23/2021    BILITOT 1.0 (H) 09/23/2021    ALKPHOS 93 09/23/2021    AST <5 09/23/2021    ALT <5 09/23/2021 LABGLOM >60.0 09/23/2021    GFRAA >60.0 09/23/2021    GLOB 2.2 (L) 09/23/2021     Lab Results   Component Value Date    WBC 5.9 09/23/2021    HGB 17.2 09/23/2021    HCT 48.4 09/23/2021    MCV 88.7 09/23/2021     09/23/2021     Lab Results   Component Value Date    LABA1C 10.3 (H) 09/23/2021     Lab Results   Component Value Date    CHOLFAST 285 (H) 09/23/2021    TRIGLYCFAST 1,580 (H) 09/23/2021    HDL 27 (L) 09/23/2021    LDLCALC see below 09/23/2021     No results found for: TESTM  No results found for: TSH, TSHREFLEX, FT3, T4FREE  No results found for: TPOABS    Review of Systems   Eyes: Negative. Cardiovascular: Negative. All other systems reviewed and are negative. Objective:   Physical Exam  Vitals reviewed. Constitutional:       Appearance: Normal appearance. HENT:      Head: Normocephalic and atraumatic. Hair is normal.      Right Ear: External ear normal.      Left Ear: External ear normal.      Nose: Nose normal.   Eyes:      General: No scleral icterus. Right eye: No discharge. Left eye: No discharge. Extraocular Movements: Extraocular movements intact. Conjunctiva/sclera: Conjunctivae normal.   Neck:      Trachea: Trachea normal.   Cardiovascular:      Rate and Rhythm: Normal rate. Pulmonary:      Effort: Pulmonary effort is normal.   Musculoskeletal:         General: Normal range of motion. Cervical back: Normal range of motion and neck supple. Neurological:      General: No focal deficit present. Mental Status: He is alert and oriented to person, place, and time.    Psychiatric:         Mood and Affect: Mood normal.         Behavior: Behavior normal.

## 2021-12-20 ENCOUNTER — TELEPHONE (OUTPATIENT)
Dept: PRIMARY CARE CLINIC | Age: 61
End: 2021-12-20

## 2021-12-20 NOTE — TELEPHONE ENCOUNTER
He saw Dr. Teodoro Ann the podiatrist and the DR said he wants his gabapentin increased to 500 mg twice a day? His # is [phone].

## 2022-01-04 ENCOUNTER — OFFICE VISIT (OUTPATIENT)
Dept: PRIMARY CARE CLINIC | Age: 62
End: 2022-01-04
Payer: COMMERCIAL

## 2022-01-04 VITALS
HEART RATE: 97 BPM | OXYGEN SATURATION: 98 % | SYSTOLIC BLOOD PRESSURE: 138 MMHG | TEMPERATURE: 97.8 F | HEIGHT: 73 IN | BODY MASS INDEX: 23.86 KG/M2 | DIASTOLIC BLOOD PRESSURE: 80 MMHG | WEIGHT: 180 LBS

## 2022-01-04 DIAGNOSIS — M79.671 PAIN IN BOTH FEET: Primary | ICD-10-CM

## 2022-01-04 DIAGNOSIS — Z12.11 COLON CANCER SCREENING: ICD-10-CM

## 2022-01-04 DIAGNOSIS — E34.9 TESTOSTERONE DEFICIENCY: ICD-10-CM

## 2022-01-04 DIAGNOSIS — N52.9 ERECTILE DYSFUNCTION, UNSPECIFIED ERECTILE DYSFUNCTION TYPE: ICD-10-CM

## 2022-01-04 DIAGNOSIS — M79.672 PAIN IN BOTH FEET: Primary | ICD-10-CM

## 2022-01-04 DIAGNOSIS — I10 HYPERTENSION, UNSPECIFIED TYPE: ICD-10-CM

## 2022-01-04 PROCEDURE — 99214 OFFICE O/P EST MOD 30 MIN: CPT | Performed by: INTERNAL MEDICINE

## 2022-01-04 RX ORDER — LISINOPRIL 20 MG/1
20 TABLET ORAL DAILY
Qty: 90 TABLET | Refills: 3 | Status: SHIPPED | OUTPATIENT
Start: 2022-01-04

## 2022-01-04 RX ORDER — SILDENAFIL 100 MG/1
100 TABLET, FILM COATED ORAL DAILY PRN
Qty: 10 TABLET | Refills: 11 | Status: SHIPPED | OUTPATIENT
Start: 2022-01-04 | End: 2022-07-28 | Stop reason: SDUPTHER

## 2022-01-04 RX ORDER — GABAPENTIN 300 MG/1
300 CAPSULE ORAL 3 TIMES DAILY
Qty: 90 CAPSULE | Refills: 2 | Status: SHIPPED | OUTPATIENT
Start: 2022-01-04 | End: 2022-07-28 | Stop reason: SDUPTHER

## 2022-01-04 ASSESSMENT — ENCOUNTER SYMPTOMS
TROUBLE SWALLOWING: 0
VOMITING: 0
CHOKING: 0
PHOTOPHOBIA: 0
VOICE CHANGE: 0
SHORTNESS OF BREATH: 0
NAUSEA: 0

## 2022-01-04 NOTE — PROGRESS NOTES
Serina Granda 63 y.o. male presents today with   Chief Complaint   Patient presents with    Peripheral Neuropathy    Discuss Medications     gabapentin increase       Foot Pain   The pain is present in the left foot and right foot. This is a chronic problem. The current episode started more than 1 year ago. The problem occurs daily. The problem has been waxing and waning. The quality of the pain is described as aching. The pain is at a severity of 4/10. The pain is moderate. Pertinent negatives include no fever. The symptoms are aggravated by activity. Hypertension  This is a chronic problem. The current episode started more than 1 year ago. The problem has been waxing and waning since onset. The problem is controlled. Associated symptoms include anxiety. Pertinent negatives include no chest pain, palpitations or shortness of breath. Past Medical History:   Diagnosis Date    Hypertension     Kidney stone     Neuropathy     Type 2 diabetes mellitus without complication ([facility] [address]      There are no problems to display for this patient.     Past Surgical History:   Procedure Laterality Date    ACHILLES TENDON SURGERY Right     KNEE SURGERY Left     TONSILLECTOMY      WRIST TENODESIS Left      Family History   Problem Relation Age of Onset    Heart Disease Mother     Heart Attack Mother      Social History     Socioeconomic History    Marital status:      Spouse name: None    Number of children: None    Years of education: None    Highest education level: None   Occupational History    None   Tobacco Use    Smoking status: Never Smoker    Smokeless tobacco: Never Used   Substance and Sexual Activity    Alcohol use: Yes     Comment: 3 beers per wk    Drug use: Yes     Types: Marijuana Sloan Stovall)    Sexual activity: None   Other Topics Concern    None   Social History Narrative    None     Social Determinants of Health     Financial Resource Strain: Low Risk     Difficulty of Paying Living Expenses: Not hard at all   Food Insecurity: No Food Insecurity    Worried About [facility] in the Last Year: Never true    Coty of Food in the Last Year: Never true   Transportation Needs: No Transportation Needs    Lack of Transportation (Medical): No    Lack of Transportation (Non-Medical): No   Physical Activity:     Days of Exercise per Week: Not on file    Minutes of Exercise per Session: Not on file   Stress:     Feeling of Stress : Not on file   Social Connections:     Frequency of Communication with Friends and Family: Not on file    Frequency of Social Gatherings with Friends and Family: Not on file    Attends Evangelical Services: Not on file    Active Member of [address] [place] [address] or Organizations: Not on file    Attends Club or Organization Meetings: Not on file    Marital Status: Not on file   Intimate Partner Violence:     Fear of Current or Ex-Partner: Not on file    Emotionally Abused: Not on file    Physically Abused: Not on file    Sexually Abused: Not on file   Housing Stability:     Unable to Pay for Housing in the Last Year: Not on file    Number of [place] in the Last Year: Not on file    Unstable Housing in the Last Year: Not on file     No Known Allergies    Review of Systems   Constitutional: Negative for fatigue and fever. HENT: Negative for trouble swallowing and voice change. Eyes: Negative for photophobia and visual disturbance. Respiratory: Negative for choking and shortness of breath. Cardiovascular: Negative for chest pain and palpitations. Gastrointestinal: Negative for nausea and vomiting. Genitourinary: Negative for decreased urine volume and urgency. Erection problems   Musculoskeletal: Negative for arthralgias. Skin: Negative for rash. Neurological: Negative for tremors and syncope. Hematological: Does not bruise/bleed easily. Psychiatric/Behavioral: Negative for suicidal ideas.            Vitals:    01/04/22 1048   BP: 138/80 Site: Left Upper Arm   Cuff Size: Medium Adult   Pulse: 97   Temp: 97.8 °F (36.6 °C)   SpO2: 98%   Weight: 180 lb (81.6 kg)   Height: 6' 1\" (1.854 m)       Physical Exam  Constitutional:       Appearance: He is well-developed. HENT:      Head: Normocephalic and atraumatic. Eyes:      Conjunctiva/sclera: Conjunctivae normal.      Pupils: Pupils are equal, round, and reactive to light. Cardiovascular:      Rate and Rhythm: Normal rate and regular rhythm. Pulmonary:      Effort: Pulmonary effort is normal. No respiratory distress. Breath sounds: No wheezing. Abdominal:      General: There is no distension. Musculoskeletal:         General: Normal range of motion. Cervical back: Normal range of motion. Skin:     General: Skin is warm. Coloration: Skin is not jaundiced. Neurological:      Mental Status: He is alert. Cranial Nerves: No cranial nerve deficit. Psychiatric:         Mood and Affect: Mood normal.        Assessment/Plan  Romel was seen today for peripheral neuropathy and discuss medications. Diagnoses and all orders for this visit:    Pain in both feet  -     gabapentin (NEURONTIN) 300 MG capsule; Take 1 capsule by mouth 3 times daily for 90 days. Hypertension, unspecified type  -     lisinopril (PRINIVIL;ZESTRIL) 20 MG tablet; Take 1 tablet by mouth daily    Colon cancer screening  -     COLONOSCOPY (Screening); Future    Testosterone deficiency  -     Testosterone Free And Total Male; Future    Erectile dysfunction, unspecified erectile dysfunction type  -     sildenafil (VIAGRA) 100 MG tablet; Take 1 tablet by mouth daily as needed for Erectile Dysfunction    Controlled Substances Monitoring: Periodic Controlled Substance Monitoring: Possible medication side effects, risk of tolerance/dependence & alternative treatments discussed. ,No signs of potential drug abuse or diversion identified. ,Assessed functional status.  Servando Benson MD)        Return in about 1 year (around 1/4/2023), or if symptoms worsen or fail to improve.     Donnie Mckeon MD

## 2022-01-13 ENCOUNTER — OFFICE VISIT (OUTPATIENT)
Dept: ENDOCRINOLOGY | Age: 62
End: 2022-01-13
Payer: COMMERCIAL

## 2022-01-13 VITALS
RESPIRATION RATE: 14 BRPM | OXYGEN SATURATION: 97 % | BODY MASS INDEX: 23.99 KG/M2 | HEIGHT: 73 IN | WEIGHT: 181 LBS | SYSTOLIC BLOOD PRESSURE: 156 MMHG | HEART RATE: 78 BPM | DIASTOLIC BLOOD PRESSURE: 92 MMHG

## 2022-01-13 DIAGNOSIS — E11.69 TYPE 2 DIABETES MELLITUS WITH OTHER SPECIFIED COMPLICATION, WITH LONG-TERM CURRENT USE OF INSULIN (HCC): Primary | ICD-10-CM

## 2022-01-13 DIAGNOSIS — Z79.4 TYPE 2 DIABETES MELLITUS WITH OTHER SPECIFIED COMPLICATION, WITH LONG-TERM CURRENT USE OF INSULIN (HCC): Primary | ICD-10-CM

## 2022-01-13 LAB
CHP ED QC CHECK: ABNORMAL
GLUCOSE BLD-MCNC: 198 MG/DL

## 2022-01-13 PROCEDURE — 99213 OFFICE O/P EST LOW 20 MIN: CPT | Performed by: INTERNAL MEDICINE

## 2022-01-13 PROCEDURE — 82962 GLUCOSE BLOOD TEST: CPT | Performed by: INTERNAL MEDICINE

## 2022-01-13 RX ORDER — HUMAN INSULIN 100 [IU]/ML
INJECTION, SUSPENSION SUBCUTANEOUS
Qty: 10 PEN | Refills: 3
Start: 2022-01-13

## 2022-01-13 NOTE — PROGRESS NOTES
2022    Assessment:       Diagnosis Orders   1.  Type 2 diabetes mellitus with other specified complication, with long-term current use of insulin (HCC)  POCT Glucose         PLAN:     Orders Placed This Encounter   Procedures    Lipid Panel     Standing Status:   Future     Standing Expiration Date:   2023     Order Specific Question:   Is Patient Fasting?/# of Hours     Answer:   y    Basic Metabolic Panel     Standing Status:   Future     Standing Expiration Date:   2023    Hemoglobin A1C     Standing Status:   Future     Standing Expiration Date:   2023    C-Peptide     Standing Status:   Future     Standing Expiration Date:   2023    Glutamic Acid Decarboxylase     Standing Status:   Future     Standing Expiration Date:   2023    POCT Glucose     Change insulin dose to 20 units with each meals patient also to talk to [employer] representative regarding insulin pump A1c goal of seven  Repeat A1c lipid panel will also do a C-peptide insulin antibody follow-up in 2 months  Orders Placed This Encounter   Medications    Insulin NPH Isophane & Regular (NOVOLIN 70/30 FLEXPEN RELION) (70-30) 100 UNIT per ML injection pen     Si units at each meals     Dispense:  10 pen     Refill:  3         Orders Placed This Encounter   Procedures    POCT Glucose       Subjective:     Chief Complaint   Patient presents with    Diabetes    1 Month Follow-Up     Vitals:    22 1147   BP: (!) 156/92   Pulse: 78   Resp: 14   SpO2: 97%   Weight: 181 lb (82.1 kg)   Height: 6' 1\" (1.854 m)     Wt Readings from Last 3 Encounters:   22 181 lb (82.1 kg)   22 180 lb (81.6 kg)   21 176 lb 3.2 oz (79.9 kg)     BP Readings from Last 3 Encounters:   22 (!) 156/92   22 138/80   21 (!) 148/96     Follow-up on type 2 diabetes blood sugars are still staying mostly in the 2-300 range denies any hypoglycemia patient is scheduled to meet with [employer] present today true    Coty of Food in the Last Year: Never true   Transportation Needs: No Transportation Needs    Lack of Transportation (Medical): No    Lack of Transportation (Non-Medical): No   Physical Activity:     Days of Exercise per Week: Not on file    Minutes of Exercise per Session: Not on file   Stress:     Feeling of Stress : Not on file   Social Connections:     Frequency of Communication with Friends and Family: Not on file    Frequency of Social Gatherings with Friends and Family: Not on file    Attends Oriental orthodox Services: Not on file    Active Member of [address] [place] [address] [id number] or Organizations: Not on file    Attends Club or Organization Meetings: Not on file    Marital Status: Not on file   Intimate Partner Violence:     Fear of Current or Ex-Partner: Not on file    Emotionally Abused: Not on file    Physically Abused: Not on file    Sexually Abused: Not on file   Housing Stability:     Unable to Pay for Housing in the Last Year: Not on file    Number of [place] in the Last Year: Not on file    Unstable Housing in the Last Year: Not on file     Family History   Problem Relation Age of Onset    Heart Disease Mother     Heart Attack Mother      No Known Allergies    Current Outpatient Medications:     gabapentin (NEURONTIN) 300 MG capsule, Take 1 capsule by mouth 3 times daily for 90 days. , Disp: 90 capsule, Rfl: 2    lisinopril (PRINIVIL;ZESTRIL) 20 MG tablet, Take 1 tablet by mouth daily, Disp: 90 tablet, Rfl: 3    sildenafil (VIAGRA) 100 MG tablet, Take 1 tablet by mouth daily as needed for Erectile Dysfunction, Disp: 10 tablet, Rfl: 11    Icosapent Ethyl (VASCEPA) 1 g CAPS capsule, Take 2 capsules by mouth 2 times daily, Disp: 120 capsule, Rfl: 3    Insulin NPH Isophane & Regular (NOVOLIN 70/30 FLEXPEN RELION) (70-30) 100 UNIT per ML injection pen, 14-16 units at each meals, Disp: 10 pen, Rfl: 3    Insulin Pen Needle (NOVOFINE PLUS PEN NEEDLE) 32G X 4 MM MISC, 1 each by Does not apply route daily, Disp: 100 each, Rfl: 3    OneTouch Delica Lancets 27C MISC, tid, Disp: 100 each, Rfl: 5    Insulin Pen Needle 32G X 4 MM MISC, 1 each by Does not apply route 2 times daily, Disp: 100 each, Rfl: 3    Insulin NPH Isophane & Regular (HUMULIN 70/30 KWIKPEN) (70-30) 100 UNIT per ML injection pen, 14 units twice daily E11.65, Disp: 10 pen, Rfl: 3    insulin lispro protamine & lispro (HUMALOG MIX 75/25 KWIKPEN) (75-25) 100 UNIT per ML SUPN injection pen, 14 units twice day, Disp: 10 pen, Rfl: 3    Insulin NPH Isophane & Regular (NOVOLIN 70/30 FLEXPEN) (70-30) 100 UNIT per ML injection pen, 14 units twice daily, Disp: 10 pen, Rfl: 3    blood glucose test strips (ONETOUCH VERIO) strip, 1 each by In Vitro route 3 times daily As needed. , Disp: 100 each, Rfl: 3    Blood Glucose Monitoring Suppl (Delight Capcarlos) w/Device KIT, As directed, Disp: 1 kit, Rfl: 0    atorvastatin (LIPITOR) 20 MG tablet, Take 1 tablet by mouth daily, Disp: 30 tablet, Rfl: 5  Lab Results   Component Value Date     (L) 09/23/2021    K 4.3 09/23/2021    CL 97 09/23/2021    CO2 20 09/23/2021    BUN 11 09/23/2021    CREATININE 0.59 (L) 09/23/2021    GLUCOSE 198 (A) 01/13/2022    CALCIUM 9.3 09/23/2021    PROT 6.8 09/23/2021    LABALBU 4.6 09/23/2021    BILITOT 1.0 (H) 09/23/2021    ALKPHOS 93 09/23/2021    AST <5 09/23/2021    ALT <5 09/23/2021    LABGLOM >60.0 09/23/2021    GFRAA >60.0 09/23/2021    GLOB 2.2 (L) 09/23/2021     Lab Results   Component Value Date    WBC 5.9 09/23/2021    HGB 17.2 09/23/2021    HCT 48.4 09/23/2021    MCV 88.7 09/23/2021     09/23/2021     Lab Results   Component Value Date    LABA1C 9.1 (A) 12/17/2021    LABA1C 10.3 (H) 09/23/2021     Lab Results   Component Value Date    CHOLFAST 285 (H) 09/23/2021    TRIGLYCFAST 1,580 (H) 09/23/2021    HDL 27 (L) 09/23/2021    LDLCALC see below 09/23/2021       Review of Systems   Endocrine: Negative.     All other systems reviewed and are negative. Objective:   Physical Exam  Vitals reviewed. Constitutional:       Appearance: Normal appearance. HENT:      Head: Normocephalic and atraumatic. Hair is normal.      Right Ear: External ear normal.      Left Ear: External ear normal.      Nose: Nose normal.   Eyes:      General: No scleral icterus. Right eye: No discharge. Left eye: No discharge. Extraocular Movements: Extraocular movements intact. Conjunctiva/sclera: Conjunctivae normal.   Neck:      Trachea: Trachea normal.   Cardiovascular:      Rate and Rhythm: Normal rate. Pulmonary:      Effort: Pulmonary effort is normal.   Musculoskeletal:         General: Normal range of motion. Cervical back: Normal range of motion and neck supple. Neurological:      General: No focal deficit present. Mental Status: He is alert and oriented to person, place, and time.    Psychiatric:         Mood and Affect: Mood normal.         Behavior: Behavior normal.

## 2022-01-27 DIAGNOSIS — E78.2 MIXED HYPERLIPIDEMIA: ICD-10-CM

## 2022-01-28 RX ORDER — ATORVASTATIN CALCIUM 20 MG/1
TABLET, FILM COATED ORAL
Qty: 30 TABLET | Refills: 5 | Status: SHIPPED | OUTPATIENT
Start: 2022-01-28 | End: 2022-08-12

## 2022-02-24 DIAGNOSIS — E11.69 TYPE 2 DIABETES MELLITUS WITH OTHER SPECIFIED COMPLICATION, WITH LONG-TERM CURRENT USE OF INSULIN (HCC): Primary | ICD-10-CM

## 2022-02-24 DIAGNOSIS — Z79.4 TYPE 2 DIABETES MELLITUS WITH OTHER SPECIFIED COMPLICATION, WITH LONG-TERM CURRENT USE OF INSULIN (HCC): Primary | ICD-10-CM

## 2022-02-24 RX ORDER — BLOOD SUGAR DIAGNOSTIC
STRIP MISCELLANEOUS
Qty: 100 STRIP | Refills: 3 | Status: SHIPPED | OUTPATIENT
Start: 2022-02-24

## 2022-02-24 NOTE — TELEPHONE ENCOUNTER
Patient requesting medication refill. Please approve or deny this request.    Rx requested:  Requested Prescriptions     Pending Prescriptions Disp Refills    Jackie Meter strip [Pharmacy Med Name: ONE TOUCH VERIO TEST STRIP] 100 strip 3     Sig: TEST 3 TIMES DAILY AS NEEDED.          Last Office Visit:   1/13/2022      Next Visit Date:  Future Appointments   Date Time Provider Power Maegan   3/17/2022 11:00 AM LALA Humphrey MD [address] [place] [address]   3/24/2022  9:15 AM [facility] [place]   4/29/2022  9:00 AM ARLENE Rivera [facility] [place]

## 2022-04-20 DIAGNOSIS — M79.671 PAIN IN BOTH FEET: ICD-10-CM

## 2022-04-20 DIAGNOSIS — M79.672 PAIN IN BOTH FEET: ICD-10-CM

## 2022-04-20 RX ORDER — GABAPENTIN 300 MG/1
300 CAPSULE ORAL 3 TIMES DAILY
Qty: 90 CAPSULE | Refills: 2 | OUTPATIENT
Start: 2022-04-20 | End: 2022-07-19

## 2022-05-20 ENCOUNTER — TELEPHONE (OUTPATIENT)
Dept: GASTROENTEROLOGY | Age: 62
End: 2022-05-20

## 2022-05-20 DIAGNOSIS — Z12.11 COLON CANCER SCREENING: Primary | ICD-10-CM

## 2022-05-20 NOTE — TELEPHONE ENCOUNTER
Spoke to patient, agreeable to colonoscopy. Patient scheduled 8/22/22 at 9:30 am. Miralax Prep mailed to patient. Referral pended to PCP. Info faxed to Leodan Frazier.

## 2022-06-06 ENCOUNTER — TELEPHONE (OUTPATIENT)
Dept: PRIMARY CARE CLINIC | Age: 62
End: 2022-06-06

## 2022-06-16 ENCOUNTER — COMMUNITY OUTREACH (OUTPATIENT)
Dept: PRIMARY CARE CLINIC | Age: 62
End: 2022-06-16

## 2022-07-28 ENCOUNTER — OFFICE VISIT (OUTPATIENT)
Dept: PRIMARY CARE CLINIC | Age: 62
End: 2022-07-28
Payer: COMMERCIAL

## 2022-07-28 VITALS
SYSTOLIC BLOOD PRESSURE: 124 MMHG | HEART RATE: 83 BPM | WEIGHT: 183 LBS | HEIGHT: 73 IN | TEMPERATURE: 98.4 F | OXYGEN SATURATION: 97 % | DIASTOLIC BLOOD PRESSURE: 88 MMHG | BODY MASS INDEX: 24.25 KG/M2

## 2022-07-28 DIAGNOSIS — N52.9 ERECTILE DYSFUNCTION, UNSPECIFIED ERECTILE DYSFUNCTION TYPE: ICD-10-CM

## 2022-07-28 DIAGNOSIS — M79.672 PAIN IN BOTH FEET: ICD-10-CM

## 2022-07-28 DIAGNOSIS — M79.671 PAIN IN BOTH FEET: ICD-10-CM

## 2022-07-28 PROCEDURE — 99213 OFFICE O/P EST LOW 20 MIN: CPT | Performed by: INTERNAL MEDICINE

## 2022-07-28 RX ORDER — GABAPENTIN 400 MG/1
400 CAPSULE ORAL 3 TIMES DAILY
Qty: 90 CAPSULE | Refills: 2 | Status: SHIPPED | OUTPATIENT
Start: 2022-07-28 | End: 2022-10-26

## 2022-07-28 RX ORDER — SILDENAFIL 100 MG/1
100 TABLET, FILM COATED ORAL DAILY PRN
Qty: 30 TABLET | Refills: 3 | Status: SHIPPED | OUTPATIENT
Start: 2022-07-28 | End: 2023-07-28

## 2022-07-28 ASSESSMENT — ENCOUNTER SYMPTOMS
PHOTOPHOBIA: 0
VOICE CHANGE: 0
CHOKING: 0
SHORTNESS OF BREATH: 0
ABDOMINAL PAIN: 0
VOMITING: 0
NAUSEA: 0
TROUBLE SWALLOWING: 0

## 2022-07-28 ASSESSMENT — PATIENT HEALTH QUESTIONNAIRE - PHQ9
2. FEELING DOWN, DEPRESSED OR HOPELESS: 0
SUM OF ALL RESPONSES TO PHQ9 QUESTIONS 1 & 2: 0
SUM OF ALL RESPONSES TO PHQ QUESTIONS 1-9: 0
1. LITTLE INTEREST OR PLEASURE IN DOING THINGS: 0
SUM OF ALL RESPONSES TO PHQ QUESTIONS 1-9: 0

## 2022-07-28 NOTE — PROGRESS NOTES
Marlys Small 63 y.o. male presents today with   Chief Complaint   Patient presents with    Neurologic Problem     6 months      Medication Refill       Neurologic Problem  Primary symptoms comment: foot pain. This is a chronic problem. The current episode started more than 1 year ago. The neurological problem developed gradually. The problem has been waxing and waning since onset. Pertinent negatives include no abdominal pain, chest pain, fatigue, fever, nausea, palpitations, shortness of breath or vomiting. Past Medical History:   Diagnosis Date    Hypertension     Kidney stone     Neuropathy     Type 2 diabetes mellitus without complication ([facility] [address]      There are no problems to display for this patient. Past Surgical History:   Procedure Laterality Date    ACHILLES TENDON SURGERY Right     KNEE SURGERY Left     TONSILLECTOMY      WRIST TENODESIS Left      Family History   Problem Relation Age of Onset    Heart Disease Mother     Heart Attack Mother      Social History     Socioeconomic History    Marital status:      Spouse name: None    Number of children: None    Years of education: None    Highest education level: None   Tobacco Use    Smoking status: Never    Smokeless tobacco: Never   Substance and Sexual Activity    Alcohol use: Yes     Comment: 3 beers per wk    Drug use: Yes     Types: Marijuana Hardesty Coil)     Social Determinants of Health     Financial Resource Strain: Low Risk     Difficulty of Paying Living Expenses: Not hard at all   Food Insecurity: No Food Insecurity    Worried About Running Out of Food in the Last Year: Never true    Ran Out of Food in the Last Year: Never true   Transportation Needs: No Transportation Needs    Lack of Transportation (Medical): No    Lack of Transportation (Non-Medical): No     No Known Allergies    Review of Systems   Constitutional:  Negative for fatigue and fever. HENT:  Negative for trouble swallowing and voice change.     Eyes:  Negative for photophobia and visual disturbance. Respiratory:  Negative for choking and shortness of breath. Cardiovascular:  Negative for chest pain and palpitations. Gastrointestinal:  Negative for abdominal pain, nausea and vomiting. Genitourinary:  Negative for decreased urine volume, testicular pain and urgency. Skin:  Negative for rash. Neurological:  Positive for numbness. Negative for tremors. Hematological:  Does not bruise/bleed easily. Psychiatric/Behavioral:  Negative for suicidal ideas. Vitals:    07/28/22 1145   BP: 124/88   Site: Left Upper Arm   Cuff Size: Large Adult   Pulse: 83   Temp: 98.4 °F (36.9 °C)   SpO2: 97%   Weight: 183 lb (83 kg)   Height: 6' 1\" (1.854 m)       Physical Exam  Constitutional:       Appearance: He is well-developed. HENT:      Head: Normocephalic and atraumatic. Eyes:      Conjunctiva/sclera: Conjunctivae normal.   Cardiovascular:      Rate and Rhythm: Normal rate and regular rhythm. Pulmonary:      Effort: Pulmonary effort is normal. No respiratory distress. Breath sounds: No wheezing. Abdominal:      General: There is no distension. Musculoskeletal:         General: Normal range of motion. Cervical back: Normal range of motion. Skin:     General: Skin is warm. Coloration: Skin is not jaundiced. Neurological:      Mental Status: He is alert. Cranial Nerves: No cranial nerve deficit. Psychiatric:         Mood and Affect: Mood normal.      Assessment/Plan  Romel was seen today for neurologic problem and medication refill. Diagnoses and all orders for this visit:    Pain in both feet  -     gabapentin (NEURONTIN) 400 MG capsule; Take 1 capsule by mouth in the morning and 1 capsule at noon and 1 capsule before bedtime. Do all this for 90 days. Erectile dysfunction, unspecified erectile dysfunction type  -     sildenafil (VIAGRA) 100 MG tablet;  Take 1 tablet by mouth daily as needed for Erectile Dysfunction    Controlled Substances Monitoring: Periodic Controlled Substance Monitoring: Possible medication side effects, risk of tolerance/dependence & alternative treatments discussed., No signs of potential drug abuse or diversion identified. , Assessed functional status. Lorrin Kayser, MD)    Return in about 3 months (around 10/28/2022), or if symptoms worsen or fail to improve.     Lorrin Kayser, MD

## 2022-08-12 DIAGNOSIS — E78.2 MIXED HYPERLIPIDEMIA: ICD-10-CM

## 2022-08-12 RX ORDER — ATORVASTATIN CALCIUM 20 MG/1
TABLET, FILM COATED ORAL
Qty: 30 TABLET | Refills: 5 | Status: SHIPPED | OUTPATIENT
Start: 2022-08-12

## 2022-08-22 ENCOUNTER — ANESTHESIA EVENT (OUTPATIENT)
Dept: ENDOSCOPY | Age: 62
End: 2022-08-22
Payer: COMMERCIAL

## 2022-08-22 ENCOUNTER — ANESTHESIA (OUTPATIENT)
Dept: ENDOSCOPY | Age: 62
End: 2022-08-22
Payer: COMMERCIAL

## 2022-08-22 ENCOUNTER — HOSPITAL ENCOUNTER (OUTPATIENT)
Age: 62
Setting detail: OUTPATIENT SURGERY
Discharge: HOME | End: 2022-08-22
Attending: INTERNAL MEDICINE | Admitting: INTERNAL MEDICINE
Payer: COMMERCIAL

## 2022-08-22 VITALS
RESPIRATION RATE: 18 BRPM | SYSTOLIC BLOOD PRESSURE: 118 MMHG | TEMPERATURE: 97.8 F | HEART RATE: 66 BPM | WEIGHT: 180 LBS | OXYGEN SATURATION: 96 % | HEIGHT: 73 IN | DIASTOLIC BLOOD PRESSURE: 79 MMHG | BODY MASS INDEX: 23.86 KG/M2

## 2022-08-22 LAB
GLUCOSE BLD-MCNC: 291 MG/DL (ref 70–99)
PERFORMED ON: ABNORMAL

## 2022-08-22 PROCEDURE — 3609027000 HC COLONOSCOPY: Performed by: INTERNAL MEDICINE

## 2022-08-22 PROCEDURE — 3700000001 HC ADD 15 MINUTES (ANESTHESIA): Performed by: INTERNAL MEDICINE

## 2022-08-22 PROCEDURE — 6370000000 HC RX 637 (ALT 250 FOR IP): Performed by: INTERNAL MEDICINE

## 2022-08-22 PROCEDURE — 88305 TISSUE EXAM BY PATHOLOGIST: CPT

## 2022-08-22 PROCEDURE — 6360000002 HC RX W HCPCS: Performed by: REGISTERED NURSE

## 2022-08-22 PROCEDURE — 2580000003 HC RX 258: Performed by: INTERNAL MEDICINE

## 2022-08-22 PROCEDURE — 3700000000 HC ANESTHESIA ATTENDED CARE: Performed by: INTERNAL MEDICINE

## 2022-08-22 PROCEDURE — 2709999900 HC NON-CHARGEABLE SUPPLY: Performed by: INTERNAL MEDICINE

## 2022-08-22 PROCEDURE — 7100000010 HC PHASE II RECOVERY - FIRST 15 MIN: Performed by: INTERNAL MEDICINE

## 2022-08-22 PROCEDURE — 2580000003 HC RX 258

## 2022-08-22 PROCEDURE — 45385 COLONOSCOPY W/LESION REMOVAL: CPT | Performed by: INTERNAL MEDICINE

## 2022-08-22 PROCEDURE — 45380 COLONOSCOPY AND BIOPSY: CPT | Performed by: INTERNAL MEDICINE

## 2022-08-22 PROCEDURE — 7100000011 HC PHASE II RECOVERY - ADDTL 15 MIN: Performed by: INTERNAL MEDICINE

## 2022-08-22 RX ORDER — SODIUM CHLORIDE 9 MG/ML
INJECTION, SOLUTION INTRAVENOUS
Status: COMPLETED
Start: 2022-08-22 | End: 2022-08-22

## 2022-08-22 RX ORDER — OMEGA-3/DHA/EPA/FISH OIL 360-1200MG
CAPSULE,DELAYED RELEASE (ENTERIC COATED) ORAL
COMMUNITY

## 2022-08-22 RX ORDER — SODIUM CHLORIDE 9 MG/ML
INJECTION, SOLUTION INTRAVENOUS CONTINUOUS
Status: DISCONTINUED | OUTPATIENT
Start: 2022-08-22 | End: 2022-08-22 | Stop reason: HOSPADM

## 2022-08-22 RX ORDER — MAGNESIUM HYDROXIDE 1200 MG/15ML
LIQUID ORAL PRN
Status: DISCONTINUED | OUTPATIENT
Start: 2022-08-22 | End: 2022-08-22 | Stop reason: ALTCHOICE

## 2022-08-22 RX ORDER — PROPOFOL 10 MG/ML
INJECTION, EMULSION INTRAVENOUS PRN
Status: DISCONTINUED | OUTPATIENT
Start: 2022-08-22 | End: 2022-08-22 | Stop reason: SDUPTHER

## 2022-08-22 RX ORDER — SODIUM CHLORIDE 9 MG/ML
INJECTION, SOLUTION INTRAVENOUS CONTINUOUS
Status: CANCELLED | OUTPATIENT
Start: 2022-08-22

## 2022-08-22 RX ORDER — SIMETHICONE 20 MG/.3ML
EMULSION ORAL PRN
Status: DISCONTINUED | OUTPATIENT
Start: 2022-08-22 | End: 2022-08-22 | Stop reason: ALTCHOICE

## 2022-08-22 RX ADMIN — PROPOFOL 50 MG: 10 INJECTION, EMULSION INTRAVENOUS at 09:18

## 2022-08-22 RX ADMIN — PROPOFOL 50 MG: 10 INJECTION, EMULSION INTRAVENOUS at 09:20

## 2022-08-22 RX ADMIN — PROPOFOL 20 MG: 10 INJECTION, EMULSION INTRAVENOUS at 09:34

## 2022-08-22 RX ADMIN — PROPOFOL 100 MG: 10 INJECTION, EMULSION INTRAVENOUS at 09:12

## 2022-08-22 RX ADMIN — SODIUM CHLORIDE: 9 INJECTION, SOLUTION INTRAVENOUS at 09:06

## 2022-08-22 RX ADMIN — PROPOFOL 50 MG: 10 INJECTION, EMULSION INTRAVENOUS at 09:30

## 2022-08-22 RX ADMIN — PROPOFOL 50 MG: 10 INJECTION, EMULSION INTRAVENOUS at 09:16

## 2022-08-22 ASSESSMENT — PAIN - FUNCTIONAL ASSESSMENT: PAIN_FUNCTIONAL_ASSESSMENT: NONE - DENIES PAIN

## 2022-08-22 NOTE — H&P
Patient Name: Humza Pathak  : 1960  MRN: [id number]  DATE: 22      ENDOSCOPY  History and Physical    Procedure:    [] Diagnostic Colonoscopy       [x] Screening Colonoscopy  [] EGD      [] ERCP      [] EUS       [] Other    [x] Previous office notes/History and Physical reviewed from the patients chart. Please see EMR for further details of HPI. I have examined the patient's status immediately prior to the procedure and:      Indications/HPI:    []Abdominal Pain   []Cancer- GI/Lung  []Fhx of colon CA  []History of Polyps   []Claytons   []Melena  []Abnormal Imaging   []Dysphagia    []Persistent Pneumonia  []Anemia   []Food Impaction  []History of Polyps  []GI Bleed   []Pulmonary nodule/Mass  []Change in bowel habits  []Heartburn/Reflux  []Rectal Bleed (BRBPR)  []Chest Pain - Non Cardiac  []Heme (+) Stool  []Ulcers  []Constipation   []Hemoptysis   []Varices  []Diarrhea   []Hypoxemia  []Nausea/Vomiting   [x]Screening   []Crohns/Colitis  []Other:    Anesthesia:   [x] MAC [] Moderate Sedation   [] General   [] None     ROS: 12 pt Review of Symptoms was negative unless mentioned above    Medications:   Prior to Admission medications    Medication Sig Start Date End Date Taking? Authorizing Provider   Omega-3 Fatty Acids (EQL OMEGA 3 FISH OIL) 1000 MG CPDR Take by mouth   Yes Historical Provider, MD   atorvastatin (LIPITOR) 20 MG tablet TAKE 1 TABLET BY MOUTH EVERY DAY 22   Benitez Soto MD   gabapentin (NEURONTIN) 400 MG capsule Take 1 capsule by mouth in the morning and 1 capsule at noon and 1 capsule before bedtime. Do all this for 90 days.  7/28/22 10/26/22  Benitez Soto MD   sildenafil (VIAGRA) 100 MG tablet Take 1 tablet by mouth daily as needed for Erectile Dysfunction 22  Benitez Soto MD   [facility] VERIO strip TEST 3 TIMES DAILY AS NEEDED. 22   Adrian Schmitz MD   Insulin NPH Isophane & Regular (NOVOLIN 70/30 FLEXPEN RELION) (70-30) 100 UNIT per ML injection pen 20 units at each meals 1/13/22   David Mcdowell MD   lisinopril (PRINIVIL;ZESTRIL) 20 MG tablet Take 1 tablet by mouth daily 1/4/22   Sofia Mccallum MD   Icosapent Ethyl (VASCEPA) 1 g CAPS capsule Take 2 capsules by mouth 2 times daily  Patient not taking: Reported on 8/22/2022 12/17/21   David Mcdowell MD   Insulin Pen Needle (NOVOFINE PLUS PEN NEEDLE) 32G X 4 MM MISC 1 each by Does not apply route daily 12/17/21   David Mcdowell MD   OneTouch Delica Lancets 75A MISC tid 12/14/21   David Mcdowell MD   Insulin Pen Needle 32G X 4 MM MISC 1 each by Does not apply route 2 times daily 12/14/21   David Mcdowell MD   Insulin NPH Isophane & Regular (HUMULIN 70/30 KWIKPEN) (70-30) 100 UNIT per ML injection pen 14 units twice daily E11.65  Patient not taking: Reported on 7/28/2022 12/10/21   David Mcdowell MD   insulin lispro protamine & lispro (HUMALOG MIX 75/25 KWIKPEN) (75-25) 100 UNIT per ML SUPN injection pen 14 units twice day  Patient not taking: Reported on 7/28/2022 11/17/21   ABELARDO Plaza   Insulin NPH Isophane & Regular (NOVOLIN 70/30 FLEXPEN) (70-30) 100 UNIT per ML injection pen 14 units twice daily  Patient not taking: Reported on 7/28/2022 10/21/21   David Mcdowell MD   Blood Glucose Monitoring Suppl (Paul Reins) w/Device KIT As directed 10/20/21   David Mcdowell MD     Allergies: No Known Allergies   History of allergic reaction to anesthesia:  No  Past Medical History:  Past Medical History:   Diagnosis Date    Hypertension     Kidney stone     Neuropathy     Type 2 diabetes mellitus without complication ([address]      Past Surgical History:  Past Surgical History:   Procedure Laterality Date    ACHILLES TENDON SURGERY Right     KNEE SURGERY Left     TONSILLECTOMY      WRIST TENODESIS Left      Social History:  Social History     Tobacco Use    Smoking status: Never    Smokeless tobacco: Never   Vaping Use    Vaping Use: Never used   Substance Use Topics    Alcohol use: Yes     Comment: 3 beers per wk    Drug use: Yes     Types: Marijuana Skip Bowers)     Comment: week ago     Vital Signs:   Vitals:    08/22/22 0945   BP: (!) 73/50   Pulse: 62   Resp: 16   Temp:    SpO2: 93%       Physical Exam:  Cardiac:  [x]WNL []Comments:  Pulmonary:  [x]WNL []Comments:   Neuro/Mental Status:  [x]WNL []Comments:  Abdominal:  [x]WNL []Comments:  Other:   []WNL []Comments:    Informed Consent:  The risks and benefits of the procedure have been discussed with either the patient or if they cannot consent, their representative. Assessment:  Patient examined and appropriate for planned sedation and procedure. Plan:  Proceed with planned sedation and procedure as above. The patient was counseled at length about risks of rachel COVID-19 in the perioperative and any recovery window from the procedure. The patient was made aware that rachel COVID-19 may worsen their prognosis for recovery from their procedure and lend to a higher morbidity and-all mortality risk. The patient was given the option of postponing the procedure all material risks, benefits, and alternatives were discussed. The patient does wish to proceed with the procedure at this time.     Isaias Daniel MD  9:45 AM

## 2022-08-22 NOTE — ANESTHESIA PRE PROCEDURE
Department of Anesthesiology  Preprocedure Note       Name:  Verna Hernandez   Age:  63 y.o.  :  1960                                          MRN:  [id number]         Date:  2022      Surgeon: Frankie Rodríguez):  Bo Broderick MD    Procedure: Procedure(s):  COLORECTAL CANCER SCREENING, NOT HIGH RISK    Medications prior to admission:   Prior to Admission medications    Medication Sig Start Date End Date Taking? Authorizing Provider   atorvastatin (LIPITOR) 20 MG tablet TAKE 1 TABLET BY MOUTH EVERY DAY 22   Marco Farah MD   gabapentin (NEURONTIN) 400 MG capsule Take 1 capsule by mouth in the morning and 1 capsule at noon and 1 capsule before bedtime. Do all this for 90 days.  7/28/22 10/26/22  Marco Farah MD   sildenafil (VIAGRA) 100 MG tablet Take 1 tablet by mouth daily as needed for Erectile Dysfunction 22  Marco Farah MD   [facility] VERIO strip TEST 3 TIMES DAILY AS NEEDED. 22   Ju White MD   Insulin NPH Isophane & Regular (NOVOLIN 70/30 FLEXPEN RELION) (70-30) 100 UNIT per ML injection pen 20 units at each meals 22   Ju White MD   lisinopril (PRINIVIL;ZESTRIL) 20 MG tablet Take 1 tablet by mouth daily 22   Marco Farah MD   Icosapent Ethyl (VASCEPA) 1 g CAPS capsule Take 2 capsules by mouth 2 times daily 21   Ju White MD   Insulin Pen Needle (NOVOFINE PLUS PEN NEEDLE) 32G X 4 MM MISC 1 each by Does not apply route daily 21   Ju White MD   OneTouch Delica Lancets 40F MISC tid 21   Ju White MD   Insulin Pen Needle 32G X 4 MM MISC 1 each by Does not apply route 2 times daily 21   Ju White MD   Insulin NPH Isophane & Regular (HUMULIN 70/30 KWIKPEN) (70-30) 100 UNIT per ML injection pen 14 units twice daily E11.65  Patient not taking: Reported on 2022 12/10/21   Ju White MD   insulin lispro protamine & lispro (HUMALOG MIX 75/25 KWIKPEN) (75-25) 100 UNIT per ML SUPN injection pen 14 units twice day  Patient not taking: Reported on 7/28/2022 11/17/21   ABELARDO Ng   Insulin NPH Isophane & Regular (NOVOLIN 70/30 FLEXPEN) (70-30) 100 UNIT per ML injection pen 14 units twice daily  Patient not taking: Reported on 7/28/2022 10/21/21   Joanna Wright MD   Blood Glucose Monitoring Suppl (Shiv Moreno) w/Device KIT As directed 10/20/21   Joanna Wright MD       Current medications:    No current facility-administered medications for this encounter. Current Outpatient Medications   Medication Sig Dispense Refill    atorvastatin (LIPITOR) 20 MG tablet TAKE 1 TABLET BY MOUTH EVERY DAY 30 tablet 5    gabapentin (NEURONTIN) 400 MG capsule Take 1 capsule by mouth in the morning and 1 capsule at noon and 1 capsule before bedtime. Do all this for 90 days.  90 capsule 2    sildenafil (VIAGRA) 100 MG tablet Take 1 tablet by mouth daily as needed for Erectile Dysfunction 30 tablet 3    ONETOUCH VERIO strip TEST 3 TIMES DAILY AS NEEDED. 100 strip 3    Insulin NPH Isophane & Regular (NOVOLIN 70/30 FLEXPEN RELION) (70-30) 100 UNIT per ML injection pen 20 units at each meals 10 pen 3    lisinopril (PRINIVIL;ZESTRIL) 20 MG tablet Take 1 tablet by mouth daily 90 tablet 3    Icosapent Ethyl (VASCEPA) 1 g CAPS capsule Take 2 capsules by mouth 2 times daily 120 capsule 3    Insulin Pen Needle (NOVOFINE PLUS PEN NEEDLE) 32G X 4 MM MISC 1 each by Does not apply route daily 100 each 3    OneTouch Delica Lancets 31Z MISC tid 100 each 5    Insulin Pen Needle 32G X 4 MM MISC 1 each by Does not apply route 2 times daily 100 each 3    Insulin NPH Isophane & Regular (HUMULIN 70/30 KWIKPEN) (70-30) 100 UNIT per ML injection pen 14 units twice daily E11.65 (Patient not taking: Reported on 7/28/2022) 10 pen 3    insulin lispro protamine & lispro (HUMALOG MIX 75/25 KWIKPEN) (75-25) 100 UNIT per ML SUPN injection pen 14 units twice day (Patient not taking: Reported on 7/28/2022) 10 pen 3    Insulin NPH Isophane & Regular (NOVOLIN 70/30 FLEXPEN) (70-30) 100 UNIT per ML injection pen 14 units twice daily (Patient not taking: Reported on 7/28/2022) 10 pen 3    Blood Glucose Monitoring Suppl Moshe Lawson) w/Device KIT As directed 1 kit 0       Allergies:  No Known Allergies    Problem List:  There is no problem list on file for this patient. Past Medical History:        Diagnosis Date    Hypertension     Kidney stone     Neuropathy     Type 2 diabetes mellitus without complication (HCC)        Past Surgical History:        Procedure Laterality Date    ACHILLES TENDON SURGERY Right     KNEE SURGERY Left     TONSILLECTOMY      WRIST TENODESIS Left        Social History:    Social History     Tobacco Use    Smoking status: Never    Smokeless tobacco: Never   Substance Use Topics    Alcohol use: Yes     Comment: 3 beers per wk                                Counseling given: Not Answered      Vital Signs (Current): There were no vitals filed for this visit.                                            BP Readings from Last 3 Encounters:   07/28/22 124/88   01/13/22 (!) 156/92   01/04/22 138/80       NPO Status:                                                                                 BMI:   Wt Readings from Last 3 Encounters:   07/28/22 183 lb (83 kg)   01/13/22 181 lb (82.1 kg)   01/04/22 180 lb (81.6 kg)     There is no height or weight on file to calculate BMI.    CBC:   Lab Results   Component Value Date/Time    WBC 5.9 09/23/2021 11:01 AM    RBC 5.45 09/23/2021 11:01 AM    HGB 17.2 09/23/2021 11:01 AM    HCT 48.4 09/23/2021 11:01 AM    MCV 88.7 09/23/2021 11:01 AM    RDW 13.4 09/23/2021 11:01 AM     09/23/2021 11:01 AM       CMP:   Lab Results   Component Value Date/Time     09/23/2021 11:01 AM    K 4.3 09/23/2021 11:01 AM    CL 97 09/23/2021 11:01 AM    CO2 20 09/23/2021 11:01 AM    BUN 11 09/23/2021 11:01 AM    CREATININE 0.59 09/23/2021 11:01 AM    GFRAA >60.0 09/23/2021 11:01 AM    LABGLOM >60.0 09/23/2021 11:01 AM    GLUCOSE 198 01/13/2022 11:53 AM    PROT 6.8 09/23/2021 11:01 AM    CALCIUM 9.3 09/23/2021 11:01 AM    BILITOT 1.0 09/23/2021 11:01 AM    ALKPHOS 93 09/23/2021 11:01 AM    AST <5 09/23/2021 11:01 AM    ALT <5 09/23/2021 11:01 AM       POC Tests: No results for input(s): POCGLU, POCNA, POCK, POCCL, POCBUN, POCHEMO, POCHCT in the last 72 hours. Coags: No results found for: PROTIME, INR, APTT    HCG (If Applicable): No results found for: PREGTESTUR, PREGSERUM, HCG, HCGQUANT     ABGs: No results found for: PHART, PO2ART, NPT8NBD, MPV3GQE, BEART, Q3SMONOH     Type & Screen (If Applicable):  No results found for: LABABO, LABRH    Drug/Infectious Status (If Applicable):  No results found for: HIV, HEPCAB    COVID-19 Screening (If Applicable): No results found for: COVID19        Anesthesia Evaluation  Patient summary reviewed and Nursing notes reviewed no history of anesthetic complications:   Airway: Mallampati: II  TM distance: >3 FB   Neck ROM: full  Mouth opening: > = 3 FB   Dental: normal exam         Pulmonary:Negative Pulmonary ROS and normal exam                               Cardiovascular:  Exercise tolerance: good (>4 METS),   (+) hypertension:,       ECG reviewed               Beta Blocker:  Not on Beta Blocker         Neuro/Psych:   Negative Neuro/Psych ROS              GI/Hepatic/Renal: Neg GI/Hepatic/Renal ROS            Endo/Other:    (+) Diabetes, . Pt had PAT visit. Abdominal:             Vascular: negative vascular ROS. Other Findings:           Anesthesia Plan      MAC     ASA 3       Induction: intravenous.                             Génesis Villarreal, APRN - CRNA   8/22/2022

## 2022-08-25 ENCOUNTER — TELEPHONE (OUTPATIENT)
Dept: PRIMARY CARE CLINIC | Age: 62
End: 2022-08-25

## 2022-10-27 ENCOUNTER — TELEPHONE (OUTPATIENT)
Dept: PRIMARY CARE CLINIC | Age: 62
End: 2022-10-27

## 2022-10-28 ENCOUNTER — NURSE ONLY (OUTPATIENT)
Dept: PRIMARY CARE CLINIC | Age: 62
End: 2022-10-28
Payer: COMMERCIAL

## 2022-10-28 DIAGNOSIS — E08.65 DIABETES MELLITUS DUE TO UNDERLYING CONDITION, UNCONTROLLED, WITH HYPERGLYCEMIA (HCC): Primary | ICD-10-CM

## 2022-10-28 LAB — HBA1C MFR BLD: 8.9 %

## 2022-10-28 PROCEDURE — 83036 HEMOGLOBIN GLYCOSYLATED A1C: CPT | Performed by: INTERNAL MEDICINE

## 2022-11-23 ENCOUNTER — OFFICE VISIT (OUTPATIENT)
Dept: PRIMARY CARE CLINIC | Age: 62
End: 2022-11-23
Payer: COMMERCIAL

## 2022-11-23 VITALS
BODY MASS INDEX: 23.86 KG/M2 | OXYGEN SATURATION: 100 % | SYSTOLIC BLOOD PRESSURE: 120 MMHG | HEART RATE: 71 BPM | DIASTOLIC BLOOD PRESSURE: 84 MMHG | WEIGHT: 180 LBS | HEIGHT: 73 IN | TEMPERATURE: 98.3 F

## 2022-11-23 DIAGNOSIS — E78.2 MIXED HYPERLIPIDEMIA: ICD-10-CM

## 2022-11-23 DIAGNOSIS — M79.672 PAIN IN BOTH FEET: ICD-10-CM

## 2022-11-23 DIAGNOSIS — Z12.5 PROSTATE CANCER SCREENING: ICD-10-CM

## 2022-11-23 DIAGNOSIS — I10 HYPERTENSION, UNSPECIFIED TYPE: ICD-10-CM

## 2022-11-23 DIAGNOSIS — N52.9 ERECTILE DYSFUNCTION, UNSPECIFIED ERECTILE DYSFUNCTION TYPE: ICD-10-CM

## 2022-11-23 DIAGNOSIS — E03.9 HYPOTHYROIDISM, UNSPECIFIED TYPE: ICD-10-CM

## 2022-11-23 DIAGNOSIS — Z00.00 PREVENTATIVE HEALTH CARE: ICD-10-CM

## 2022-11-23 DIAGNOSIS — Z53.20 HIV SCREENING DECLINED: ICD-10-CM

## 2022-11-23 DIAGNOSIS — N52.8 OTHER MALE ERECTILE DYSFUNCTION: ICD-10-CM

## 2022-11-23 DIAGNOSIS — Z00.00 PREVENTATIVE HEALTH CARE: Primary | ICD-10-CM

## 2022-11-23 DIAGNOSIS — E34.9 TESTOSTERONE DEFICIENCY: ICD-10-CM

## 2022-11-23 DIAGNOSIS — Z11.4 ENCOUNTER FOR SCREENING FOR HIV: ICD-10-CM

## 2022-11-23 DIAGNOSIS — E08.65 DIABETES MELLITUS DUE TO UNDERLYING CONDITION, UNCONTROLLED, WITH HYPERGLYCEMIA (HCC): ICD-10-CM

## 2022-11-23 DIAGNOSIS — M79.671 PAIN IN BOTH FEET: ICD-10-CM

## 2022-11-23 LAB
ALBUMIN SERPL-MCNC: 4.5 G/DL (ref 3.5–4.6)
ALP BLD-CCNC: 84 U/L (ref 35–104)
ALT SERPL-CCNC: 34 U/L (ref 0–41)
ANION GAP SERPL CALCULATED.3IONS-SCNC: 13 MEQ/L (ref 9–15)
AST SERPL-CCNC: 25 U/L (ref 0–40)
BASOPHILS ABSOLUTE: 0 K/UL (ref 0–0.2)
BASOPHILS RELATIVE PERCENT: 0.6 %
BILIRUB SERPL-MCNC: 1.3 MG/DL (ref 0.2–0.7)
BUN BLDV-MCNC: 16 MG/DL (ref 8–23)
CALCIUM SERPL-MCNC: 9.8 MG/DL (ref 8.5–9.9)
CHLORIDE BLD-SCNC: 103 MEQ/L (ref 95–107)
CHOLESTEROL, FASTING: 126 MG/DL (ref 0–199)
CO2: 24 MEQ/L (ref 20–31)
CREAT SERPL-MCNC: 0.65 MG/DL (ref 0.7–1.2)
EOSINOPHILS ABSOLUTE: 0.1 K/UL (ref 0–0.7)
EOSINOPHILS RELATIVE PERCENT: 1.2 %
GFR SERPL CREATININE-BSD FRML MDRD: >60 ML/MIN/{1.73_M2}
GLOBULIN: 2.7 G/DL (ref 2.3–3.5)
GLUCOSE BLD-MCNC: 263 MG/DL (ref 70–99)
HBA1C MFR BLD: 9.5 % (ref 4.8–5.9)
HCT VFR BLD CALC: 45.7 % (ref 42–52)
HDLC SERPL-MCNC: 37 MG/DL (ref 40–59)
HEMOGLOBIN: 15.5 G/DL (ref 14–18)
HIV AG/AB: NONREACTIVE
LDL CHOLESTEROL CALCULATED: 52 MG/DL (ref 0–129)
LYMPHOCYTES ABSOLUTE: 1.9 K/UL (ref 1–4.8)
LYMPHOCYTES RELATIVE PERCENT: 33.2 %
MCH RBC QN AUTO: 29.6 PG (ref 27–31.3)
MCHC RBC AUTO-ENTMCNC: 33.9 % (ref 33–37)
MCV RBC AUTO: 87.3 FL (ref 79–92.2)
MONOCYTES ABSOLUTE: 0.4 K/UL (ref 0.2–0.8)
MONOCYTES RELATIVE PERCENT: 7.1 %
NEUTROPHILS ABSOLUTE: 3.3 K/UL (ref 1.4–6.5)
NEUTROPHILS RELATIVE PERCENT: 57.9 %
PDW BLD-RTO: 13.2 % (ref 11.5–14.5)
PLATELET # BLD: 156 K/UL (ref 130–400)
POTASSIUM SERPL-SCNC: 4.2 MEQ/L (ref 3.4–4.9)
PROSTATE SPECIFIC ANTIGEN: 0.4 NG/ML (ref 0–4)
RBC # BLD: 5.23 M/UL (ref 4.7–6.1)
SEX HORMONE BINDING GLOBULIN: 24 NMOL/L (ref 11–80)
SODIUM BLD-SCNC: 140 MEQ/L (ref 135–144)
TESTOSTERONE FREE-NONMALE: 78.2 PG/ML (ref 47–244)
TESTOSTERONE TOTAL: 331 NG/DL (ref 220–1000)
TOTAL PROTEIN: 7.2 G/DL (ref 6.3–8)
TRIGLYCERIDE, FASTING: 187 MG/DL (ref 0–150)
TSH REFLEX: 0.98 UIU/ML (ref 0.44–3.86)
WBC # BLD: 5.7 K/UL (ref 4.8–10.8)

## 2022-11-23 PROCEDURE — 99396 PREV VISIT EST AGE 40-64: CPT | Performed by: INTERNAL MEDICINE

## 2022-11-23 PROCEDURE — 3078F DIAST BP <80 MM HG: CPT | Performed by: INTERNAL MEDICINE

## 2022-11-23 PROCEDURE — 3074F SYST BP LT 130 MM HG: CPT | Performed by: INTERNAL MEDICINE

## 2022-11-23 RX ORDER — TADALAFIL 20 MG/1
20 TABLET ORAL PRN
Qty: 10 TABLET | Refills: 3 | Status: SHIPPED | OUTPATIENT
Start: 2022-11-23

## 2022-11-23 RX ORDER — PREGABALIN 75 MG/1
75 CAPSULE ORAL 3 TIMES DAILY
Qty: 90 CAPSULE | Refills: 2 | Status: SHIPPED | OUTPATIENT
Start: 2022-11-23 | End: 2023-02-21

## 2022-11-23 SDOH — ECONOMIC STABILITY: FOOD INSECURITY: WITHIN THE PAST 12 MONTHS, YOU WORRIED THAT YOUR FOOD WOULD RUN OUT BEFORE YOU GOT MONEY TO BUY MORE.: NEVER TRUE

## 2022-11-23 SDOH — ECONOMIC STABILITY: FOOD INSECURITY: WITHIN THE PAST 12 MONTHS, THE FOOD YOU BOUGHT JUST DIDN'T LAST AND YOU DIDN'T HAVE MONEY TO GET MORE.: NEVER TRUE

## 2022-11-23 ASSESSMENT — PATIENT HEALTH QUESTIONNAIRE - PHQ9
SUM OF ALL RESPONSES TO PHQ QUESTIONS 1-9: 0
2. FEELING DOWN, DEPRESSED OR HOPELESS: 0
SUM OF ALL RESPONSES TO PHQ QUESTIONS 1-9: 0
1. LITTLE INTEREST OR PLEASURE IN DOING THINGS: 0
SUM OF ALL RESPONSES TO PHQ9 QUESTIONS 1 & 2: 0

## 2022-11-23 ASSESSMENT — ENCOUNTER SYMPTOMS
SHORTNESS OF BREATH: 0
TROUBLE SWALLOWING: 0
VOICE CHANGE: 0
VOMITING: 0
NAUSEA: 0
PHOTOPHOBIA: 0
CHOKING: 0
BLURRED VISION: 0

## 2022-11-23 ASSESSMENT — SOCIAL DETERMINANTS OF HEALTH (SDOH): HOW HARD IS IT FOR YOU TO PAY FOR THE VERY BASICS LIKE FOOD, HOUSING, MEDICAL CARE, AND HEATING?: NOT HARD AT ALL

## 2022-11-23 NOTE — PROGRESS NOTES
Deep Rivera 63 y.o. male presents today with   Chief Complaint   Patient presents with    3 Month Follow-Up    Neurologic Problem    Medication Refill     Annual.  Foot Pain   The pain is present in the left foot and right foot. This is a chronic problem. The current episode started more than 1 year ago. The problem occurs daily. The problem has been waxing and waning. The quality of the pain is described as aching. The pain is at a severity of 4/10. The pain is moderate. Pertinent negatives include no fever. Diabetes  He presents for his follow-up diabetic visit. He has type 2 diabetes mellitus. His disease course has been fluctuating. Pertinent negatives for hypoglycemia include no confusion or tremors. Pertinent negatives for diabetes include no blurred vision, no chest pain and no fatigue. Past Medical History:   Diagnosis Date    Hypertension     Kidney stone     Neuropathy     Type 2 diabetes mellitus without complication ([facility] [address]      There are no problems to display for this patient.     Past Surgical History:   Procedure Laterality Date    ACHILLES TENDON SURGERY Right     COLONOSCOPY N/A 8/22/2022    COLORECTAL CANCER SCREENING, NOT HIGH RISK performed by Randy Johnson MD at [facility] 68 Left     TONSILLECTOMY      WRIST TENODESIS Left      Family History   Problem Relation Age of Onset    Heart Disease Mother     Heart Attack Mother     Colon Cancer Neg Hx      Social History     Socioeconomic History    Marital status:      Spouse name: None    Number of children: None    Years of education: None    Highest education level: None   Tobacco Use    Smoking status: Never    Smokeless tobacco: Never   Vaping Use    Vaping Use: Never used   Substance and Sexual Activity    Alcohol use: Yes     Comment: 3 beers per wk    Drug use: Yes     Types: Marijuana Deadra Evelio)     Comment: week ago     Social Determinants of Health     Financial Resource Strain: Low Risk     Difficulty of Paying Living Expenses: Not hard at all   Food Insecurity: No Food Insecurity    Worried About [facility] in the Last Year: Never true    Ran Out of Food in the Last Year: Never true     No Known Allergies    Review of Systems   Constitutional:  Negative for fatigue and fever. HENT:  Negative for trouble swallowing and voice change. Eyes:  Negative for blurred vision, photophobia and visual disturbance. Respiratory:  Negative for choking and shortness of breath. Cardiovascular:  Negative for chest pain and palpitations. Gastrointestinal:  Negative for nausea and vomiting. Genitourinary:  Negative for decreased urine volume, testicular pain and urgency. Skin:  Negative for rash. Neurological:  Negative for tremors and syncope. Hematological:  Does not bruise/bleed easily. Psychiatric/Behavioral:  Negative for confusion and suicidal ideas. Vitals:    11/23/22 0951   BP: 120/84   Site: Left Upper Arm   Position: Sitting   Cuff Size: Medium Adult   Pulse: 71   Temp: 98.3 °F (36.8 °C)   SpO2: 100%   Weight: 180 lb (81.6 kg)   Height: 6' 1\" (1.854 m)       Physical Exam  Constitutional:       Appearance: He is well-developed. HENT:      Head: Normocephalic. Eyes:      Conjunctiva/sclera: Conjunctivae normal.   Cardiovascular:      Rate and Rhythm: Regular rhythm. Pulmonary:      Effort: Pulmonary effort is normal. No respiratory distress. Abdominal:      General: There is no distension. Musculoskeletal:         General: Normal range of motion. Cervical back: Normal range of motion. Skin:     Coloration: Skin is not jaundiced. Neurological:      Mental Status: He is alert. Cranial Nerves: No cranial nerve deficit. Assessment/Plan  Romel was seen today for 3 month follow-up, neurologic problem and medication refill. Diagnoses and all orders for this visit:    Preventative health care  -     Cancel: Hemoglobin A1C; Future  -     Lipid, Fasting;  Future  - TSH with Reflex; Future  -     PSA Screening; Future  -     CBC with Auto Differential; Future  -     Comprehensive Metabolic Panel; Future  -     HIV Screen; Future    Pain in both feet  -     pregabalin (LYRICA) 75 MG capsule; Take 1 capsule by mouth 3 times daily for 90 days. Diabetes mellitus due to underlying condition, uncontrolled, with hyperglycemia [facility]  -     Cancel: Hemoglobin A1C; Future    Hypertension, unspecified type  -     CBC with Auto Differential; Future  -     Comprehensive Metabolic Panel; Future    Mixed hyperlipidemia  -     Lipid, Fasting; Future    Other male erectile dysfunction  -     tadalafil (CIALIS) 20 MG tablet; Take 1 tablet by mouth as needed for Erectile Dysfunction    Erectile dysfunction, unspecified erectile dysfunction type    Prostate cancer screening  -     PSA Screening; Future    HIV screening declined    Testosterone deficiency  -     Testosterone, free, total; Future    Encounter for screening for HIV  -     HIV Screen; Future    Hypothyroidism, unspecified type  -     TSH with Reflex; Future      Return in about 1 year (around 11/23/2023), or if symptoms worsen or fail to improve.     Andrea Mendoza MD

## 2022-12-15 ENCOUNTER — OFFICE VISIT (OUTPATIENT)
Dept: ENDOCRINOLOGY | Age: 62
End: 2022-12-15

## 2022-12-15 VITALS
DIASTOLIC BLOOD PRESSURE: 83 MMHG | HEIGHT: 73 IN | BODY MASS INDEX: 24.78 KG/M2 | OXYGEN SATURATION: 95 % | WEIGHT: 187 LBS | HEART RATE: 74 BPM | SYSTOLIC BLOOD PRESSURE: 121 MMHG

## 2022-12-15 DIAGNOSIS — Z79.4 TYPE 2 DIABETES MELLITUS WITH OTHER SPECIFIED COMPLICATION, WITH LONG-TERM CURRENT USE OF INSULIN (HCC): Primary | ICD-10-CM

## 2022-12-15 DIAGNOSIS — E11.69 TYPE 2 DIABETES MELLITUS WITH OTHER SPECIFIED COMPLICATION, WITH LONG-TERM CURRENT USE OF INSULIN (HCC): Primary | ICD-10-CM

## 2022-12-15 DIAGNOSIS — I10 HYPERTENSION, UNSPECIFIED TYPE: ICD-10-CM

## 2022-12-15 LAB
CHP ED QC CHECK: NORMAL
GLUCOSE BLD-MCNC: 152 MG/DL

## 2022-12-15 RX ORDER — BLOOD-GLUCOSE SENSOR
EACH MISCELLANEOUS
Qty: 3 EACH | Refills: 4 | Status: SHIPPED | OUTPATIENT
Start: 2022-12-15

## 2022-12-15 RX ORDER — BLOOD-GLUCOSE TRANSMITTER
EACH MISCELLANEOUS
Qty: 1 EACH | Refills: 0 | Status: SHIPPED | OUTPATIENT
Start: 2022-12-15

## 2022-12-15 RX ORDER — BLOOD-GLUCOSE,RECEIVER,CONT
EACH MISCELLANEOUS
Qty: 1 EACH | Refills: 1 | Status: SHIPPED | OUTPATIENT
Start: 2022-12-15

## 2022-12-15 RX ORDER — BLOOD SUGAR DIAGNOSTIC
STRIP MISCELLANEOUS
Qty: 100 STRIP | Refills: 3 | Status: SHIPPED | OUTPATIENT
Start: 2022-12-15

## 2022-12-15 RX ORDER — HUMAN INSULIN 100 [IU]/ML
INJECTION, SUSPENSION SUBCUTANEOUS
Qty: 10 ADJUSTABLE DOSE PRE-FILLED PEN SYRINGE | Refills: 3 | Status: SHIPPED | OUTPATIENT
Start: 2022-12-15

## 2022-12-15 RX ORDER — LISINOPRIL 20 MG/1
20 TABLET ORAL DAILY
Qty: 90 TABLET | Refills: 3 | Status: SHIPPED | OUTPATIENT
Start: 2022-12-15

## 2022-12-15 NOTE — PROGRESS NOTES
12/15/2022    Assessment:       Diagnosis Orders   1. Type 2 diabetes mellitus with other specified complication, with long-term current use of insulin ([facility])  POCT Glucose      2. Hypertension, unspecified type              PLAN:     Orders Placed This Encounter   Procedures    Microalbumin / Creatinine Urine Ratio     Standing Status:   Future     Standing Expiration Date:   12/15/2023    Hemoglobin A1C     Standing Status:   Future     Standing Expiration Date:       Basic Metabolic Panel, Fasting     Standing Status:   Future     Standing Expiration Date:   12/15/2023    POCT Glucose     DIABETES FOOT EXAM     Orders Placed This Encounter   Medications    blood glucose test strips (ONETOUCH VERIO) strip     Sig: TEST 3 TIMES DAILY AS NEEDED. Dispense:  100 strip     Refill:  3    Insulin NPH Isophane & Regular (NOVOLIN 70/30 FLEXPEN RELION) (70-30) 100 UNIT per ML injection pen     Si units at each meals     Dispense:  10 Adjustable Dose Pre-filled Pen Syringe     Refill:  3    lisinopril (PRINIVIL;ZESTRIL) 20 MG tablet     Sig: Take 1 tablet by mouth daily     Dispense:  90 tablet     Refill:  3    Continuous Blood Gluc Sensor (DEXCOM G6 SENSOR) MISC     Sig: Every 10 days     Dispense:  3 each     Refill:  4    Continuous Blood Gluc Transmit (DEXCOM G6 TRANSMITTER) MISC     Sig: As directed     Dispense:  1 each     Refill:  0    Continuous Blood Gluc  (DEXCOM G6 ) ANDREW     Sig: As directed     Dispense:  1 each     Refill:  1     Continue current dose of Novolin 70/30 prescription given for Dexcom continuous glucose monitoring patient advised to talk to Oesia regarding getting insulin pump therapy    Orders Placed This Encounter   Procedures    POCT Glucose     No orders of the defined types were placed in this encounter. No follow-ups on file.   Subjective:     Chief Complaint   Patient presents with    Diabetes     Vitals:    12/15/22 1401   BP: 121/83   Pulse: 74   SpO2: 95%   Weight: 187 lb (84.8 kg)   Height: 6' 1\" (1.854 m)     Wt Readings from Last 3 Encounters:   12/15/22 187 lb (84.8 kg)   11/23/22 180 lb (81.6 kg)   08/22/22 180 lb (81.6 kg)     BP Readings from Last 3 Encounters:   12/15/22 121/83   11/23/22 120/84   08/22/22 118/79     Follow-up on type 2 diabetes with fluctuating glucose levels currently on Novolin 70/30 20 units twice daily history of diabetic neuropathy last hemoglobin A1c was 9.5    Diabetes  He presents for his follow-up diabetic visit. He has type 2 diabetes mellitus. Pertinent negatives for diabetes include no polydipsia, no polyuria and no weight loss. Symptoms are improving. Diabetic complications include peripheral neuropathy. Current diabetic treatment includes insulin injections. He is currently taking insulin pre-breakfast, pre-lunch and pre-dinner. His overall blood glucose range is >200 mg/dl. (Hemoglobin A1C       Date                     Value               Ref Range           Status                11/23/2022               9.5 (H)             4.8 - 5.9 %         Final            ----------  ) An ACE inhibitor/angiotensin II receptor blocker is being taken.    Past Medical History:   Diagnosis Date    Hypertension     Kidney stone     Neuropathy     Type 2 diabetes mellitus without complication ([facility] [address]      Past Surgical History:   Procedure Laterality Date    ACHILLES TENDON SURGERY Right     COLONOSCOPY N/A 8/22/2022    COLORECTAL CANCER SCREENING, NOT HIGH RISK performed by Asia Bueno MD at [place] [id number] Left     TONSILLECTOMY      WRIST TENODESIS Left      Social History     Socioeconomic History    Marital status:      Spouse name: Not on file    Number of children: Not on file    Years of education: Not on file    Highest education level: Not on file   Occupational History    Not on file   Tobacco Use    Smoking status: Never    Smokeless tobacco: Never   Vaping Use    Vaping Use: Never used Substance and Sexual Activity    Alcohol use: Yes     Comment: 3 beers per wk    Drug use: Yes     Types: Marijuana Maida Mann)     Comment: week ago    Sexual activity: Not on file   Other Topics Concern    Not on file   Social History Narrative    Not on file     Social Determinants of Health     Financial Resource Strain: Low Risk     Difficulty of Paying Living Expenses: Not hard at all   Food Insecurity: No Food Insecurity    Worried About Running Out of Food in the Last Year: Never true    Ran Out of Food in the Last Year: Never true   Transportation Needs: Not on file   Physical Activity: Not on file   Stress: Not on file   Social Connections: Not on file   Intimate Partner Violence: Not on file   Housing Stability: Not on file     Family History   Problem Relation Age of Onset    Heart Disease Mother     Heart Attack Mother     Colon Cancer Neg Hx      No Known Allergies    Current Outpatient Medications:     tadalafil (CIALIS) 20 MG tablet, Take 1 tablet by mouth as needed for Erectile Dysfunction, Disp: 10 tablet, Rfl: 3    pregabalin (LYRICA) 75 MG capsule, Take 1 capsule by mouth 3 times daily for 90 days. , Disp: 90 capsule, Rfl: 2    Omega-3 Fatty Acids (EQL OMEGA 3 FISH OIL) 1000 MG CPDR, Take by mouth, Disp: , Rfl:     atorvastatin (LIPITOR) 20 MG tablet, TAKE 1 TABLET BY MOUTH EVERY DAY, Disp: 30 tablet, Rfl: 5    sildenafil (VIAGRA) 100 MG tablet, Take 1 tablet by mouth daily as needed for Erectile Dysfunction, Disp: 30 tablet, Rfl: 3    ONETOUCH VERIO strip, TEST 3 TIMES DAILY AS NEEDED., Disp: 100 strip, Rfl: 3    Insulin NPH Isophane & Regular (NOVOLIN 70/30 FLEXPEN RELION) (70-30) 100 UNIT per ML injection pen, 20 units at each meals, Disp: 10 pen, Rfl: 3    lisinopril (PRINIVIL;ZESTRIL) 20 MG tablet, Take 1 tablet by mouth daily, Disp: 90 tablet, Rfl: 3    Icosapent Ethyl (VASCEPA) 1 g CAPS capsule, Take 2 capsules by mouth 2 times daily, Disp: 120 capsule, Rfl: 3    Insulin Pen Needle (Carlee Check PLUS PEN NEEDLE) 32G X 4 MM MISC, 1 each by Does not apply route daily, Disp: 100 each, Rfl: 3    OneTouch Delica Lancets 63W MISC, tid, Disp: 100 each, Rfl: 5    Blood Glucose Monitoring Suppl (ONETOUCH VERIO) w/Device KIT, As directed, Disp: 1 kit, Rfl: 0  Lab Results   Component Value Date     11/23/2022    K 4.2 11/23/2022     11/23/2022    CO2 24 11/23/2022    BUN 16 11/23/2022    CREATININE 0.65 (L) 11/23/2022    GLUCOSE 152 12/15/2022    CALCIUM 9.8 11/23/2022    PROT 7.2 11/23/2022    LABALBU 4.5 11/23/2022    BILITOT 1.3 (H) 11/23/2022    ALKPHOS 84 11/23/2022    AST 25 11/23/2022    ALT 34 11/23/2022    LABGLOM >60.0 11/23/2022    GFRAA >60.0 09/23/2021    GLOB 2.7 11/23/2022     Lab Results   Component Value Date    WBC 5.7 11/23/2022    HGB 15.5 11/23/2022    HCT 45.7 11/23/2022    MCV 87.3 11/23/2022     11/23/2022     Lab Results   Component Value Date    LABA1C 9.5 (H) 11/23/2022    LABA1C 8.9 (H) 10/28/2022    LABA1C 9.1 (A) 12/17/2021     Lab Results   Component Value Date    CHOLFAST 126 11/23/2022    CHOLFAST 285 (H) 09/23/2021    TRIGLYCFAST 187 (H) 11/23/2022    TRIGLYCFAST 1,580 (H) 09/23/2021    HDL 37 (L) 11/23/2022    HDL 27 (L) 09/23/2021    LDLCALC 52 11/23/2022    LDLCALC see below 09/23/2021     No results found for: TESTM  Lab Results   Component Value Date    TSHREFLEX 0.980 11/23/2022     No results found for: TPOABS    Review of Systems   Constitutional:  Negative for weight loss. Eyes: Negative. Cardiovascular: Negative. Endocrine: Negative. Negative for polydipsia and polyuria. All other systems reviewed and are negative. Objective:   Physical Exam  Vitals reviewed. Constitutional:       General: He is not in acute distress. Appearance: Normal appearance. HENT:      Head: Normocephalic and atraumatic. Right Ear: External ear normal.      Left Ear: External ear normal.      Nose: Nose normal.   Eyes:      General: No scleral icterus. Right eye: No discharge. Left eye: No discharge. Extraocular Movements: Extraocular movements intact. Conjunctiva/sclera: Conjunctivae normal.   Cardiovascular:      Rate and Rhythm: Normal rate. Pulmonary:      Effort: Pulmonary effort is normal.   Musculoskeletal:         General: Normal range of motion. Cervical back: Normal range of motion and neck supple. Feet:    Neurological:      General: No focal deficit present. Mental Status: He is alert and oriented to person, place, and time.    Psychiatric:         Mood and Affect: Mood normal.         Behavior: Behavior normal.

## 2022-12-18 ASSESSMENT — ENCOUNTER SYMPTOMS: EYES NEGATIVE: 1

## 2022-12-21 NOTE — TELEPHONE ENCOUNTER
Dexcom G6 approved    PA Case: [id number], Status: Approved, Coverage Starts on: 12/20/2022 12:00:00 AM, Coverage Ends on: 12/20/2023 12:00:00 AM.

## 2023-01-28 DIAGNOSIS — I10 HYPERTENSION, UNSPECIFIED TYPE: ICD-10-CM

## 2023-01-31 RX ORDER — LISINOPRIL 20 MG/1
TABLET ORAL
Qty: 90 TABLET | Refills: 2 | Status: SHIPPED | OUTPATIENT
Start: 2023-01-31 | End: 2023-03-08 | Stop reason: SDUPTHER

## 2023-02-21 DIAGNOSIS — E78.2 MIXED HYPERLIPIDEMIA: ICD-10-CM

## 2023-02-21 RX ORDER — ATORVASTATIN CALCIUM 20 MG/1
TABLET, FILM COATED ORAL
Qty: 90 TABLET | Refills: 1 | Status: SHIPPED | OUTPATIENT
Start: 2023-02-21

## 2023-03-08 ENCOUNTER — OFFICE VISIT (OUTPATIENT)
Dept: PRIMARY CARE CLINIC | Age: 63
End: 2023-03-08
Payer: COMMERCIAL

## 2023-03-08 VITALS
DIASTOLIC BLOOD PRESSURE: 68 MMHG | SYSTOLIC BLOOD PRESSURE: 118 MMHG | HEART RATE: 74 BPM | OXYGEN SATURATION: 99 % | BODY MASS INDEX: 24.65 KG/M2 | HEIGHT: 73 IN | WEIGHT: 186 LBS

## 2023-03-08 DIAGNOSIS — E11.69 TYPE 2 DIABETES MELLITUS WITH OTHER SPECIFIED COMPLICATION, WITH LONG-TERM CURRENT USE OF INSULIN (HCC): ICD-10-CM

## 2023-03-08 DIAGNOSIS — E78.2 MIXED HYPERLIPIDEMIA: ICD-10-CM

## 2023-03-08 DIAGNOSIS — M79.672 PAIN IN BOTH FEET: ICD-10-CM

## 2023-03-08 DIAGNOSIS — N52.8 OTHER MALE ERECTILE DYSFUNCTION: ICD-10-CM

## 2023-03-08 DIAGNOSIS — Z79.4 TYPE 2 DIABETES MELLITUS WITH OTHER SPECIFIED COMPLICATION, WITH LONG-TERM CURRENT USE OF INSULIN (HCC): ICD-10-CM

## 2023-03-08 DIAGNOSIS — I10 HYPERTENSION, UNSPECIFIED TYPE: ICD-10-CM

## 2023-03-08 DIAGNOSIS — M79.671 PAIN IN BOTH FEET: ICD-10-CM

## 2023-03-08 LAB
ANION GAP SERPL CALCULATED.3IONS-SCNC: 10 MEQ/L (ref 9–15)
BUN BLDV-MCNC: 18 MG/DL (ref 8–23)
CALCIUM SERPL-MCNC: 9.9 MG/DL (ref 8.5–9.9)
CHLORIDE BLD-SCNC: 99 MEQ/L (ref 95–107)
CO2: 26 MEQ/L (ref 20–31)
CREAT SERPL-MCNC: 0.74 MG/DL (ref 0.7–1.2)
CREATININE URINE: 202.2 MG/DL
GFR SERPL CREATININE-BSD FRML MDRD: >60 ML/MIN/{1.73_M2}
GLUCOSE FASTING: 239 MG/DL (ref 70–99)
HBA1C MFR BLD: 8.8 % (ref 4.8–5.9)
MICROALBUMIN UR-MCNC: 4.9 MG/DL
MICROALBUMIN/CREAT UR-RTO: 24.2 MG/G (ref 0–30)
POTASSIUM SERPL-SCNC: 4.5 MEQ/L (ref 3.4–4.9)
SODIUM BLD-SCNC: 135 MEQ/L (ref 135–144)

## 2023-03-08 PROCEDURE — 3078F DIAST BP <80 MM HG: CPT | Performed by: INTERNAL MEDICINE

## 2023-03-08 PROCEDURE — 99214 OFFICE O/P EST MOD 30 MIN: CPT | Performed by: INTERNAL MEDICINE

## 2023-03-08 PROCEDURE — 3074F SYST BP LT 130 MM HG: CPT | Performed by: INTERNAL MEDICINE

## 2023-03-08 RX ORDER — TADALAFIL 20 MG/1
20 TABLET ORAL DAILY
Qty: 30 TABLET | Refills: 11 | Status: SHIPPED | OUTPATIENT
Start: 2023-03-08

## 2023-03-08 RX ORDER — ATORVASTATIN CALCIUM 20 MG/1
20 TABLET, FILM COATED ORAL DAILY
Qty: 90 TABLET | Refills: 3 | Status: SHIPPED | OUTPATIENT
Start: 2023-03-08

## 2023-03-08 RX ORDER — PREGABALIN 100 MG/1
CAPSULE ORAL
Qty: 120 CAPSULE | Refills: 2 | Status: SHIPPED | OUTPATIENT
Start: 2023-03-08 | End: 2023-06-07

## 2023-03-08 RX ORDER — LISINOPRIL 20 MG/1
20 TABLET ORAL DAILY
Qty: 90 TABLET | Refills: 3 | Status: SHIPPED | OUTPATIENT
Start: 2023-03-08

## 2023-03-08 RX ORDER — ICOSAPENT ETHYL 1000 MG/1
2 CAPSULE ORAL 2 TIMES DAILY
Qty: 360 CAPSULE | Refills: 3 | Status: SHIPPED | OUTPATIENT
Start: 2023-03-08

## 2023-03-08 SDOH — ECONOMIC STABILITY: FOOD INSECURITY: WITHIN THE PAST 12 MONTHS, THE FOOD YOU BOUGHT JUST DIDN'T LAST AND YOU DIDN'T HAVE MONEY TO GET MORE.: NEVER TRUE

## 2023-03-08 SDOH — ECONOMIC STABILITY: HOUSING INSECURITY
IN THE LAST 12 MONTHS, WAS THERE A TIME WHEN YOU DID NOT HAVE A STEADY PLACE TO SLEEP OR SLEPT IN A SHELTER (INCLUDING NOW)?: NO

## 2023-03-08 SDOH — ECONOMIC STABILITY: INCOME INSECURITY: HOW HARD IS IT FOR YOU TO PAY FOR THE VERY BASICS LIKE FOOD, HOUSING, MEDICAL CARE, AND HEATING?: NOT HARD AT ALL

## 2023-03-08 SDOH — ECONOMIC STABILITY: FOOD INSECURITY: WITHIN THE PAST 12 MONTHS, YOU WORRIED THAT YOUR FOOD WOULD RUN OUT BEFORE YOU GOT MONEY TO BUY MORE.: NEVER TRUE

## 2023-03-08 ASSESSMENT — PATIENT HEALTH QUESTIONNAIRE - PHQ9
SUM OF ALL RESPONSES TO PHQ QUESTIONS 1-9: 0
SUM OF ALL RESPONSES TO PHQ QUESTIONS 1-9: 0
2. FEELING DOWN, DEPRESSED OR HOPELESS: 0
SUM OF ALL RESPONSES TO PHQ9 QUESTIONS 1 & 2: 0
SUM OF ALL RESPONSES TO PHQ QUESTIONS 1-9: 0
SUM OF ALL RESPONSES TO PHQ QUESTIONS 1-9: 0
1. LITTLE INTEREST OR PLEASURE IN DOING THINGS: 0

## 2023-03-08 ASSESSMENT — ENCOUNTER SYMPTOMS
NAUSEA: 0
VOICE CHANGE: 0
VOMITING: 0
TROUBLE SWALLOWING: 0
CHOKING: 0
PHOTOPHOBIA: 0
SHORTNESS OF BREATH: 0

## 2023-03-08 NOTE — PROGRESS NOTES
Maldonado Jarrett 63 y.o. male presents today with   Chief Complaint   Patient presents with    Hypertension    Diabetes    3 Month Follow-Up    Medication Refill       Hypertension  This is a chronic problem. The current episode started more than 1 year ago. The problem has been waxing and waning since onset. The problem is controlled. Associated symptoms include anxiety. Pertinent negatives include no chest pain, palpitations or shortness of breath. Diabetes  Pertinent negatives for hypoglycemia include no tremors. Pertinent negatives for diabetes include no chest pain and no fatigue. Medication Refill  Associated symptoms include numbness. Pertinent negatives include no chest pain, fatigue, fever, nausea, rash or vomiting. Foot Pain   The pain is present in the right foot and left foot. This is a chronic problem. The current episode started more than 1 year ago. The problem occurs daily. The problem has been waxing and waning. The quality of the pain is described as aching. The pain is at a severity of 5/10. The pain is moderate. Associated symptoms include numbness and tingling. Pertinent negatives include no fever. Past Medical History:   Diagnosis Date    Hypertension     Kidney stone     Neuropathy     Type 2 diabetes mellitus without complication ([facility] [address]      There are no problems to display for this patient.     Past Surgical History:   Procedure Laterality Date    ACHILLES TENDON SURGERY Right     COLONOSCOPY N/A 8/22/2022    COLORECTAL CANCER SCREENING, NOT HIGH RISK performed by Lexis Young MD at [facility] 68 Left     TONSILLECTOMY      WRIST TENODESIS Left      Family History   Problem Relation Age of Onset    Heart Disease Mother     Heart Attack Mother     Colon Cancer Neg Hx      Social History     Socioeconomic History    Marital status:    Tobacco Use    Smoking status: Never    Smokeless tobacco: Never   Vaping Use    Vaping Use: Never used   Substance and Sexual Activity    Alcohol use: Yes     Comment: 3 beers per wk    Drug use: Yes     Types: Marijuana (Weed)     Comment: week ago     Social Determinants of Health     Financial Resource Strain: Low Risk     Difficulty of Paying Living Expenses: Not hard at all   Food Insecurity: No Food Insecurity    Worried About Running Out of Food in the Last Year: Never true    Ran Out of Food in the Last Year: Never true   Transportation Needs: Unknown    Lack of Transportation (Non-Medical): No   Housing Stability: Unknown    Unstable Housing in the Last Year: No     No Known Allergies    Review of Systems   Constitutional:  Negative for fatigue and fever.   HENT:  Negative for trouble swallowing and voice change.    Eyes:  Negative for photophobia and visual disturbance.   Respiratory:  Negative for choking and shortness of breath.    Cardiovascular:  Negative for chest pain and palpitations.   Gastrointestinal:  Negative for nausea and vomiting.   Genitourinary:  Negative for decreased urine volume, testicular pain and urgency.   Skin:  Negative for rash.   Neurological:  Positive for tingling and numbness. Negative for tremors and syncope.   Hematological:  Does not bruise/bleed easily.   Psychiatric/Behavioral:  Negative for suicidal ideas.          Vitals:    03/08/23 1057   BP: 118/68   Pulse: 74   SpO2: 99%   Weight: 186 lb (84.4 kg)   Height: 6' 1\" (1.854 m)       Physical Exam  Constitutional:       Appearance: He is well-developed.   HENT:      Head: Normocephalic.   Eyes:      Conjunctiva/sclera: Conjunctivae normal.   Cardiovascular:      Rate and Rhythm: Regular rhythm.   Pulmonary:      Effort: Pulmonary effort is normal. No respiratory distress.   Abdominal:      General: There is no distension.   Musculoskeletal:         General: Normal range of motion.      Cervical back: Normal range of motion.   Skin:     Coloration: Skin is not jaundiced.   Neurological:      Mental Status: He is alert.   Psychiatric:          Mood and Affect: Mood normal.     Assessment/Plan  Romel was seen today for hypertension, diabetes, 3 month follow-up and medication refill. Diagnoses and all orders for this visit:    Pain in both feet  -     pregabalin (LYRICA) 100 MG capsule; 1 pill bid and 2 pills qhs    Mixed hyperlipidemia  -     Icosapent Ethyl (VASCEPA) 1 g CAPS capsule; Take 2 capsules by mouth 2 times daily  -     atorvastatin (LIPITOR) 20 MG tablet; Take 1 tablet by mouth daily    Hypertension, unspecified type  -     lisinopril (PRINIVIL;ZESTRIL) 20 MG tablet; Take 1 tablet by mouth daily    Other male erectile dysfunction  -     tadalafil (CIALIS) 20 MG tablet; Take 1 tablet by mouth daily  Controlled Substances Monitoring: Periodic Controlled Substance Monitoring: Possible medication side effects, risk of tolerance/dependence & alternative treatments discussed., No signs of potential drug abuse or diversion identified. , Assessed functional status. Nell Basilio MD)      No follow-ups on file.     Nell Basilio MD

## 2023-03-16 ENCOUNTER — OFFICE VISIT (OUTPATIENT)
Dept: ENDOCRINOLOGY | Age: 63
End: 2023-03-16

## 2023-03-16 VITALS
OXYGEN SATURATION: 94 % | SYSTOLIC BLOOD PRESSURE: 99 MMHG | HEART RATE: 63 BPM | DIASTOLIC BLOOD PRESSURE: 67 MMHG | WEIGHT: 187 LBS | BODY MASS INDEX: 24.78 KG/M2 | HEIGHT: 73 IN

## 2023-03-16 DIAGNOSIS — E11.69 TYPE 2 DIABETES MELLITUS WITH OTHER SPECIFIED COMPLICATION, WITH LONG-TERM CURRENT USE OF INSULIN (HCC): Primary | ICD-10-CM

## 2023-03-16 DIAGNOSIS — Z79.4 TYPE 2 DIABETES MELLITUS WITH OTHER SPECIFIED COMPLICATION, WITH LONG-TERM CURRENT USE OF INSULIN (HCC): Primary | ICD-10-CM

## 2023-03-16 LAB
CHP ED QC CHECK: NORMAL
GLUCOSE BLD-MCNC: 278 MG/DL

## 2023-03-16 RX ORDER — BLOOD-GLUCOSE TRANSMITTER
EACH MISCELLANEOUS
Qty: 1 EACH | Refills: 0 | Status: SHIPPED | OUTPATIENT
Start: 2023-03-16

## 2023-03-16 RX ORDER — HUMAN INSULIN 100 [IU]/ML
INJECTION, SUSPENSION SUBCUTANEOUS
Qty: 10 ADJUSTABLE DOSE PRE-FILLED PEN SYRINGE | Refills: 3
Start: 2023-03-16

## 2023-03-16 RX ORDER — BLOOD-GLUCOSE SENSOR
EACH MISCELLANEOUS
Qty: 3 EACH | Refills: 4 | Status: SHIPPED | OUTPATIENT
Start: 2023-03-16

## 2023-03-16 ASSESSMENT — ENCOUNTER SYMPTOMS: EYES NEGATIVE: 1

## 2023-03-16 NOTE — PROGRESS NOTES
3/16/2023    Assessment:       Diagnosis Orders   1. Type 2 diabetes mellitus with other specified complication, with long-term current use of insulin ([facility])  POCT Glucose            PLAN:     Orders Placed This Encounter   Procedures    Hemoglobin A1C     Standing Status:   Future     Standing Expiration Date:       Basic Metabolic Panel     Standing Status:   Future     Standing Expiration Date:   3/16/2024    POCT Glucose     Orders Placed This Encounter   Medications    Continuous Blood Gluc Sensor (DEXCOM G6 SENSOR) MISC     Sig: Every 10 days     Dispense:  3 each     Refill:  4    Continuous Blood Gluc Transmit (DEXCOM G6 TRANSMITTER) MISC     Sig: As directed     Dispense:  1 each     Refill:  0    Insulin NPH Isophane & Regular (NOVOLIN 70/30 FLEXPEN RELION) (70-30) 100 UNIT per ML injection pen     Si units at each meals     Dispense:  10 Adjustable Dose Pre-filled Pen Syringe     Refill:  3    Continuous Blood Gluc Sensor (DEXCOM G6 SENSOR) MISC     Sig: Change every 10 days     Dispense:  3 each     Refill:  11    Continuous Blood Gluc Transmit (DEXCOM G6 TRANSMITTER) MISC     Sig: Change every 90 days     Dispense:  1 each     Refill:  3     Continue current dose of Novolin 70/30  Prescription given for Dexcom continuous glucose monitoring and samples  Diabetes education provided today:    Nutrition as a mainstream of diabetes therapy. Larose about label reading. Continuous Glucose monitor. How it works and checks blood sugars every 5 min. for 4 days during our tests. Managing high and low sugar readings. Orders Placed This Encounter   Procedures    POCT Glucose     No orders of the defined types were placed in this encounter. No follow-ups on file.   Subjective:     Chief Complaint   Patient presents with    Diabetes    Hypertension     Vitals:    23 1440   BP: 99/67   Pulse: 63   SpO2: 94%   Weight: 187 lb (84.8 kg)   Height: 6' 1\" (1.854 m)     Wt Readings from Last 3 Encounters:   03/16/23 187 lb (84.8 kg)   03/08/23 186 lb (84.4 kg)   12/15/22 187 lb (84.8 kg)     BP Readings from Last 3 Encounters:   03/16/23 99/67   03/08/23 118/68   12/15/22 121/83     Follow-up on type 2 diabetes patient on Novolin 70/30 24 units with each meals blood sugars are fluctuating requesting Dexcom continuous glucose monitoring A1c was 8.8 average blood sugars close to 200 denies any severe hypoglycemia    Diabetes  He presents for his follow-up diabetic visit. He has type 2 diabetes mellitus. His disease course has been fluctuating. Pertinent negatives for diabetes include no polydipsia, no polyuria and no weight loss. Symptoms are stable. Diabetic complications include impotence and peripheral neuropathy. His overall blood glucose range is 180-200 mg/dl. (Hemoglobin A1C       Date                     Value               Ref Range           Status                03/08/2023               8.8 (H)             4.8 - 5.9 %         Final            ----------  ) An ACE inhibitor/angiotensin II receptor blocker is being taken.    Past Medical History:   Diagnosis Date    Hypertension     Kidney stone     Neuropathy     Type 2 diabetes mellitus without complication ([facility] [address]      Past Surgical History:   Procedure Laterality Date    ACHILLES TENDON SURGERY Right     COLONOSCOPY N/A 8/22/2022    COLORECTAL CANCER SCREENING, NOT HIGH RISK performed by Fatimah Abdalla MD at [place] [id number] Left     TONSILLECTOMY      WRIST TENODESIS Left      Social History     Socioeconomic History    Marital status:      Spouse name: Not on file    Number of children: Not on file    Years of education: Not on file    Highest education level: Not on file   Occupational History    Not on file   Tobacco Use    Smoking status: Never    Smokeless tobacco: Never   Vaping Use    Vaping Use: Never used   Substance and Sexual Activity    Alcohol use: Yes     Comment: 3 beers per wk    Drug use: Yes     Types: Marijuana (Weed)     Comment: week ago    Sexual activity: Not on file   Other Topics Concern    Not on file   Social History Narrative    Not on file     Social Determinants of Health     Financial Resource Strain: Low Risk     Difficulty of Paying Living Expenses: Not hard at all   Food Insecurity: No Food Insecurity    Worried About [address] in the Last Year: Never true    [address] in the Last Year: Never true   Transportation Needs: Unknown    Lack of Transportation (Medical): Not on file    Lack of Transportation (Non-Medical):  No   Physical Activity: Not on file   Stress: Not on file   Social Connections: Not on file   Intimate Partner Violence: Not on file   Housing Stability: Unknown    Unable to Pay for Housing in the Last Year: Not on file    Number of Places Lived in the Last Year: Not on file    Unstable Housing in the Last Year: No     Family History   Problem Relation Age of Onset    Heart Disease Mother     Heart Attack Mother     Colon Cancer Neg Hx      No Known Allergies    Current Outpatient Medications:     pregabalin (LYRICA) 100 MG capsule, 1 pill bid and 2 pills qhs, Disp: 120 capsule, Rfl: 2    Icosapent Ethyl (VASCEPA) 1 g CAPS capsule, Take 2 capsules by mouth 2 times daily, Disp: 360 capsule, Rfl: 3    atorvastatin (LIPITOR) 20 MG tablet, Take 1 tablet by mouth daily, Disp: 90 tablet, Rfl: 3    lisinopril (PRINIVIL;ZESTRIL) 20 MG tablet, Take 1 tablet by mouth daily, Disp: 90 tablet, Rfl: 3    tadalafil (CIALIS) 20 MG tablet, Take 1 tablet by mouth daily, Disp: 30 tablet, Rfl: 11    blood glucose test strips (ONETOUCH VERIO) strip, TEST 3 TIMES DAILY AS NEEDED., Disp: 100 strip, Rfl: 3    Insulin NPH Isophane & Regular (NOVOLIN 70/30 FLEXPEN RELION) (70-30) 100 UNIT per ML injection pen, 20 units at each meals, Disp: 10 Adjustable Dose Pre-filled Pen Syringe, Rfl: 3    Continuous Blood Gluc Sensor (DEXCOM G6 SENSOR) MISC, Every 10 days, Disp: 3 each, Rfl: 4    Continuous Blood Gluc Transmit (DEXCOM G6 TRANSMITTER) MISC, As directed, Disp: 1 each, Rfl: 0    Continuous Blood Gluc  (DEXCOM G6 ) ANDREW, As directed, Disp: 1 each, Rfl: 1    Omega-3 Fatty Acids (EQL OMEGA 3 FISH OIL) 1000 MG CPDR, Take by mouth, Disp: , Rfl:     sildenafil (VIAGRA) 100 MG tablet, Take 1 tablet by mouth daily as needed for Erectile Dysfunction, Disp: 30 tablet, Rfl: 3    Insulin Pen Needle (NOVOFINE PLUS PEN NEEDLE) 32G X 4 MM MISC, 1 each by Does not apply route daily, Disp: 100 each, Rfl: 3    OneTouch Delica Lancets 17K MISC, tid, Disp: 100 each, Rfl: 5    Blood Glucose Monitoring Suppl (ONETOUCH VERIO) w/Device KIT, As directed, Disp: 1 kit, Rfl: 0  Lab Results   Component Value Date     03/08/2023    K 4.5 03/08/2023    CL 99 03/08/2023    CO2 26 03/08/2023    BUN 18 03/08/2023    CREATININE 0.74 03/08/2023    GLUCOSE 278 03/16/2023    CALCIUM 9.9 03/08/2023    PROT 7.2 11/23/2022    LABALBU 4.5 11/23/2022    BILITOT 1.3 (H) 11/23/2022    ALKPHOS 84 11/23/2022    AST 25 11/23/2022    ALT 34 11/23/2022    LABGLOM >60.0 03/08/2023    GFRAA >60.0 09/23/2021    GLOB 2.7 11/23/2022     Lab Results   Component Value Date    WBC 5.7 11/23/2022    HGB 15.5 11/23/2022    HCT 45.7 11/23/2022    MCV 87.3 11/23/2022     11/23/2022     Lab Results   Component Value Date    LABA1C 8.8 (H) 03/08/2023    LABA1C 9.5 (H) 11/23/2022    LABA1C 8.9 (H) 10/28/2022     Lab Results   Component Value Date    CHOLFAST 126 11/23/2022    CHOLFAST 285 (H) 09/23/2021    TRIGLYCFAST 187 (H) 11/23/2022    TRIGLYCFAST 1,580 (H) 09/23/2021    HDL 37 (L) 11/23/2022    HDL 27 (L) 09/23/2021    LDLCALC 52 11/23/2022    LDLCALC see below 09/23/2021     No results found for: TESTM  Lab Results   Component Value Date    TSHREFLEX 0.980 11/23/2022     No results found for: TPOABS    Review of Systems   Constitutional:  Negative for weight loss. Eyes: Negative. Cardiovascular: Negative. Endocrine: Negative. Negative for polydipsia and polyuria. Genitourinary:  Positive for impotence. All other systems reviewed and are negative. Objective:   Physical Exam  Vitals reviewed. Constitutional:       General: He is not in acute distress. Appearance: Normal appearance. He is normal weight. HENT:      Head: Normocephalic and atraumatic. Right Ear: External ear normal.      Left Ear: External ear normal.      Nose: Nose normal.   Eyes:      General: No scleral icterus. Right eye: No discharge. Left eye: No discharge. Extraocular Movements: Extraocular movements intact. Conjunctiva/sclera: Conjunctivae normal.   Cardiovascular:      Rate and Rhythm: Normal rate. Pulmonary:      Effort: Pulmonary effort is normal.   Musculoskeletal:         General: Normal range of motion. Cervical back: Normal range of motion and neck supple. Neurological:      General: No focal deficit present. Mental Status: He is alert and oriented to person, place, and time.    Psychiatric:         Mood and Affect: Mood normal.         Behavior: Behavior normal.

## 2023-03-19 RX ORDER — BLOOD-GLUCOSE SENSOR
EACH MISCELLANEOUS
Qty: 3 EACH | Refills: 11 | Status: SHIPPED | OUTPATIENT
Start: 2023-03-19

## 2023-03-19 RX ORDER — BLOOD-GLUCOSE TRANSMITTER
EACH MISCELLANEOUS
Qty: 1 EACH | Refills: 3 | Status: SHIPPED | OUTPATIENT
Start: 2023-03-19

## 2023-04-28 DIAGNOSIS — Z79.4 TYPE 2 DIABETES MELLITUS WITH OTHER SPECIFIED COMPLICATION, WITH LONG-TERM CURRENT USE OF INSULIN (HCC): ICD-10-CM

## 2023-04-28 DIAGNOSIS — E11.69 TYPE 2 DIABETES MELLITUS WITH OTHER SPECIFIED COMPLICATION, WITH LONG-TERM CURRENT USE OF INSULIN (HCC): ICD-10-CM

## 2023-04-28 RX ORDER — HUMAN INSULIN 100 [IU]/ML
INJECTION, SUSPENSION SUBCUTANEOUS
Qty: 10 ADJUSTABLE DOSE PRE-FILLED PEN SYRINGE | Refills: 3 | Status: SHIPPED | OUTPATIENT
Start: 2023-04-28

## 2023-04-28 NOTE — TELEPHONE ENCOUNTER
Patient requesting medication refill.  Please approve or deny this request.    Rx requested:  Requested Prescriptions     Pending Prescriptions Disp Refills    Insulin NPH Isophane & Regular (NOVOLIN 70/30 FLEXPEN RELION) (70-30) 100 UNIT per ML injection pen 10 Adjustable Dose Pre-filled Pen Syringe 3     Si units at each meals         Last Office Visit:   3/16/2023      Next Visit Date:  Future Appointments   Date Time Provider Power [place]   6/15/2023  3:00 PM Alonso Thomas MD [facility]

## 2023-08-21 ENCOUNTER — OFFICE VISIT (OUTPATIENT)
Dept: PRIMARY CARE CLINIC | Age: 63
End: 2023-08-21
Payer: COMMERCIAL

## 2023-08-21 VITALS
HEIGHT: 73 IN | BODY MASS INDEX: 24.78 KG/M2 | WEIGHT: 187 LBS | DIASTOLIC BLOOD PRESSURE: 80 MMHG | HEART RATE: 74 BPM | OXYGEN SATURATION: 98 % | SYSTOLIC BLOOD PRESSURE: 130 MMHG

## 2023-08-21 DIAGNOSIS — M79.672 PAIN IN BOTH FEET: ICD-10-CM

## 2023-08-21 DIAGNOSIS — M79.671 PAIN IN BOTH FEET: ICD-10-CM

## 2023-08-21 PROCEDURE — 99213 OFFICE O/P EST LOW 20 MIN: CPT | Performed by: INTERNAL MEDICINE

## 2023-08-21 RX ORDER — PREGABALIN 100 MG/1
CAPSULE ORAL
Qty: 120 CAPSULE | Refills: 2 | Status: SHIPPED | OUTPATIENT
Start: 2023-08-21 | End: 2024-02-03

## 2023-08-21 ASSESSMENT — ENCOUNTER SYMPTOMS
TROUBLE SWALLOWING: 0
CHOKING: 0
SHORTNESS OF BREATH: 0
PHOTOPHOBIA: 0
VOMITING: 0
NAUSEA: 0
VOICE CHANGE: 0

## 2023-08-30 DIAGNOSIS — E78.2 MIXED HYPERLIPIDEMIA: ICD-10-CM

## 2023-08-30 RX ORDER — ATORVASTATIN CALCIUM 20 MG/1
TABLET, FILM COATED ORAL
Qty: 90 TABLET | Refills: 1 | Status: SHIPPED | OUTPATIENT
Start: 2023-08-30

## 2023-09-13 DIAGNOSIS — E11.69 TYPE 2 DIABETES MELLITUS WITH OTHER SPECIFIED COMPLICATION, WITH LONG-TERM CURRENT USE OF INSULIN (HCC): ICD-10-CM

## 2023-09-13 DIAGNOSIS — Z79.4 TYPE 2 DIABETES MELLITUS WITH OTHER SPECIFIED COMPLICATION, WITH LONG-TERM CURRENT USE OF INSULIN (HCC): ICD-10-CM

## 2023-09-13 LAB
ANION GAP SERPL CALCULATED.3IONS-SCNC: 12 MEQ/L (ref 9–15)
BUN SERPL-MCNC: 17 MG/DL (ref 8–23)
CALCIUM SERPL-MCNC: 9.6 MG/DL (ref 8.5–9.9)
CHLORIDE SERPL-SCNC: 100 MEQ/L (ref 95–107)
CO2 SERPL-SCNC: 23 MEQ/L (ref 20–31)
CREAT SERPL-MCNC: 0.66 MG/DL (ref 0.7–1.2)
GLUCOSE SERPL-MCNC: 175 MG/DL (ref 70–99)
HBA1C MFR BLD: 9.3 % (ref 4.8–5.9)
POTASSIUM SERPL-SCNC: 4.2 MEQ/L (ref 3.4–4.9)
SODIUM SERPL-SCNC: 135 MEQ/L (ref 135–144)

## 2023-09-14 ENCOUNTER — OFFICE VISIT (OUTPATIENT)
Dept: ENDOCRINOLOGY | Age: 63
End: 2023-09-14

## 2023-09-14 VITALS
WEIGHT: 191 LBS | DIASTOLIC BLOOD PRESSURE: 75 MMHG | SYSTOLIC BLOOD PRESSURE: 126 MMHG | HEIGHT: 73 IN | BODY MASS INDEX: 25.31 KG/M2 | HEART RATE: 79 BPM | OXYGEN SATURATION: 96 %

## 2023-09-14 DIAGNOSIS — E11.69 TYPE 2 DIABETES MELLITUS WITH OTHER SPECIFIED COMPLICATION, WITH LONG-TERM CURRENT USE OF INSULIN (HCC): Primary | ICD-10-CM

## 2023-09-14 DIAGNOSIS — Z79.4 TYPE 2 DIABETES MELLITUS WITH OTHER SPECIFIED COMPLICATION, WITH LONG-TERM CURRENT USE OF INSULIN (HCC): Primary | ICD-10-CM

## 2023-09-14 LAB
CHP ED QC CHECK: NORMAL
GLUCOSE BLD-MCNC: 174 MG/DL

## 2023-09-20 ASSESSMENT — ENCOUNTER SYMPTOMS
VISUAL CHANGE: 0
EYES NEGATIVE: 1

## 2023-11-06 ENCOUNTER — OFFICE VISIT (OUTPATIENT)
Dept: PRIMARY CARE CLINIC | Age: 63
End: 2023-11-06
Payer: COMMERCIAL

## 2023-11-06 VITALS
SYSTOLIC BLOOD PRESSURE: 110 MMHG | HEART RATE: 72 BPM | WEIGHT: 193 LBS | BODY MASS INDEX: 25.58 KG/M2 | DIASTOLIC BLOOD PRESSURE: 72 MMHG | HEIGHT: 73 IN | RESPIRATION RATE: 17 BRPM | OXYGEN SATURATION: 96 %

## 2023-11-06 DIAGNOSIS — M25.562 ACUTE PAIN OF LEFT KNEE: Primary | ICD-10-CM

## 2023-11-06 DIAGNOSIS — M79.605 ACUTE LEG PAIN, LEFT: ICD-10-CM

## 2023-11-06 DIAGNOSIS — R60.0 EDEMA OF LEFT LOWER LEG: ICD-10-CM

## 2023-11-06 PROCEDURE — 99213 OFFICE O/P EST LOW 20 MIN: CPT | Performed by: INTERNAL MEDICINE

## 2023-11-06 RX ORDER — POTASSIUM CHLORIDE 20 MEQ/1
20 TABLET, EXTENDED RELEASE ORAL DAILY
Qty: 3 TABLET | Refills: 0 | Status: SHIPPED | OUTPATIENT
Start: 2023-11-06

## 2023-11-06 RX ORDER — FUROSEMIDE 20 MG/1
20 TABLET ORAL DAILY
Qty: 3 TABLET | Refills: 0 | Status: SHIPPED | OUTPATIENT
Start: 2023-11-06

## 2023-11-06 ASSESSMENT — ENCOUNTER SYMPTOMS
ABDOMINAL DISTENTION: 0
TROUBLE SWALLOWING: 0
SHORTNESS OF BREATH: 0
CHOKING: 0
PHOTOPHOBIA: 0
VOICE CHANGE: 0
VOMITING: 0
NAUSEA: 0

## 2023-11-06 NOTE — PROGRESS NOTES
Expenses: Not hard at all   Food Insecurity: No Food Insecurity (3/8/2023)    Hunger Vital Sign     Worried About Running Out of Food in the Last Year: Never true     Ran Out of Food in the Last Year: Never true   Transportation Needs: Unknown (3/8/2023)    PRAPARE - Transportation     Lack of Transportation (Non-Medical): No   Housing Stability: Unknown (3/8/2023)    Housing Stability Vital Sign     Unstable Housing in the Last Year: No     No Known Allergies    Review of Systems   Constitutional:  Negative for fatigue and fever. HENT:  Negative for trouble swallowing and voice change. Eyes:  Negative for photophobia and visual disturbance. Respiratory:  Negative for choking and shortness of breath. Cardiovascular:  Positive for leg swelling. Negative for chest pain and palpitations. Gastrointestinal:  Negative for abdominal distention, nausea and vomiting. Genitourinary:  Negative for decreased urine volume, testicular pain and urgency. Musculoskeletal:  Positive for arthralgias. Skin:  Negative for rash. Neurological:  Negative for tremors and syncope. Hematological:  Does not bruise/bleed easily. Psychiatric/Behavioral:  Negative for suicidal ideas. Vitals:    11/06/23 1449   BP: 110/72   Pulse: 72   Resp: 17   SpO2: 96%   Weight: 87.5 kg (193 lb)   Height: 1.854 m (6' 0.99\")       Physical Exam  Constitutional:       Appearance: He is well-developed. HENT:      Head: Normocephalic. Eyes:      Conjunctiva/sclera: Conjunctivae normal.   Cardiovascular:      Rate and Rhythm: Regular rhythm. Pulmonary:      Effort: Pulmonary effort is normal. No respiratory distress. Breath sounds: No wheezing. Abdominal:      General: There is no distension. Tenderness: There is no abdominal tenderness. Musculoskeletal:         General: Normal range of motion. Cervical back: Normal range of motion. Skin:     Coloration: Skin is not jaundiced.              Comments: puffy

## 2023-11-13 ENCOUNTER — HOSPITAL ENCOUNTER (OUTPATIENT)
Dept: ULTRASOUND IMAGING | Age: 63
Discharge: HOME | End: 2023-11-15
Attending: INTERNAL MEDICINE
Payer: COMMERCIAL

## 2023-11-13 DIAGNOSIS — M79.605 ACUTE LEG PAIN, LEFT: ICD-10-CM

## 2023-11-13 PROCEDURE — 93971 EXTREMITY STUDY: CPT

## 2023-12-01 DIAGNOSIS — I10 HYPERTENSION, UNSPECIFIED TYPE: ICD-10-CM

## 2023-12-01 RX ORDER — LISINOPRIL 20 MG/1
20 TABLET ORAL DAILY
Qty: 90 TABLET | Refills: 2 | Status: SHIPPED | OUTPATIENT
Start: 2023-12-01

## 2023-12-14 ENCOUNTER — OFFICE VISIT (OUTPATIENT)
Dept: ENDOCRINOLOGY | Age: 63
End: 2023-12-14
Payer: COMMERCIAL

## 2023-12-14 VITALS
OXYGEN SATURATION: 95 % | BODY MASS INDEX: 24.65 KG/M2 | DIASTOLIC BLOOD PRESSURE: 73 MMHG | HEIGHT: 73 IN | HEART RATE: 79 BPM | WEIGHT: 186 LBS | SYSTOLIC BLOOD PRESSURE: 119 MMHG

## 2023-12-14 DIAGNOSIS — Z46.81 COUNSELING FOR INSULIN PUMP: ICD-10-CM

## 2023-12-14 DIAGNOSIS — E11.69 TYPE 2 DIABETES MELLITUS WITH OTHER SPECIFIED COMPLICATION, WITH LONG-TERM CURRENT USE OF INSULIN (HCC): Primary | ICD-10-CM

## 2023-12-14 DIAGNOSIS — Z79.4 TYPE 2 DIABETES MELLITUS WITH OTHER SPECIFIED COMPLICATION, WITH LONG-TERM CURRENT USE OF INSULIN (HCC): Primary | ICD-10-CM

## 2023-12-14 LAB
CHP ED QC CHECK: ABNORMAL
GLUCOSE BLD-MCNC: 343 MG/DL
HBA1C MFR BLD: 10.5 %

## 2023-12-14 PROCEDURE — 83036 HEMOGLOBIN GLYCOSYLATED A1C: CPT | Performed by: INTERNAL MEDICINE

## 2023-12-14 PROCEDURE — 99213 OFFICE O/P EST LOW 20 MIN: CPT | Performed by: INTERNAL MEDICINE

## 2023-12-14 PROCEDURE — 3046F HEMOGLOBIN A1C LEVEL >9.0%: CPT | Performed by: INTERNAL MEDICINE

## 2023-12-14 PROCEDURE — 82962 GLUCOSE BLOOD TEST: CPT | Performed by: INTERNAL MEDICINE

## 2023-12-14 NOTE — PROGRESS NOTES
Unable to Pay for Housing in the Last Year: Not on file     Number of Places Lived in the Last Year: Not on file     Unstable Housing in the Last Year: No     Family History   Problem Relation Age of Onset    Heart Disease Mother     Heart Attack Mother     Colon Cancer Neg Hx      No Known Allergies    Current Outpatient Medications:     lisinopril (PRINIVIL;ZESTRIL) 20 MG tablet, TAKE 1 TABLET BY MOUTH EVERY DAY, Disp: 90 tablet, Rfl: 2    furosemide (LASIX) 20 MG tablet, Take 1 tablet by mouth daily, Disp: 3 tablet, Rfl: 0    potassium chloride (KLOR-CON M) 20 MEQ extended release tablet, Take 1 tablet by mouth daily, Disp: 3 tablet, Rfl: 0    atorvastatin (LIPITOR) 20 MG tablet, TAKE 1 TABLET BY MOUTH EVERY DAY, Disp: 90 tablet, Rfl: 1    pregabalin (LYRICA) 100 MG capsule, 1 pill bid and 2 pills qhs, Disp: 120 capsule, Rfl: 2    Insulin NPH Isophane & Regular (NOVOLIN 70/30 FLEXPEN RELION) (70-30) 100 UNIT per ML injection pen, 24 units at each meals, Disp: 10 Adjustable Dose Pre-filled Pen Syringe, Rfl: 3    Icosapent Ethyl (VASCEPA) 1 g CAPS capsule, Take 2 capsules by mouth 2 times daily, Disp: 360 capsule, Rfl: 3    tadalafil (CIALIS) 20 MG tablet, Take 1 tablet by mouth daily, Disp: 30 tablet, Rfl: 11    blood glucose test strips (ONETOUCH VERIO) strip, TEST 3 TIMES DAILY AS NEEDED., Disp: 100 strip, Rfl: 3    Omega-3 Fatty Acids (EQL OMEGA 3 FISH OIL) 1000 MG CPDR, Take by mouth, Disp: , Rfl:     Insulin Pen Needle (NOVOFINE PLUS PEN NEEDLE) 32G X 4 MM MISC, 1 each by Does not apply route daily, Disp: 100 each, Rfl: 3    OneTouch Delica Lancets 48Z MISC, tid, Disp: 100 each, Rfl: 5    Blood Glucose Monitoring Suppl (ONETOUCH VERIO) w/Device KIT, As directed, Disp: 1 kit, Rfl: 0    sildenafil (VIAGRA) 100 MG tablet, Take 1 tablet by mouth daily as needed for Erectile Dysfunction, Disp: 30 tablet, Rfl: 3  Lab Results   Component Value Date     09/13/2023    K 4.2 09/13/2023     09/13/2023

## 2024-01-25 ENCOUNTER — OFFICE VISIT (OUTPATIENT)
Dept: PRIMARY CARE | Facility: CLINIC | Age: 64
End: 2024-01-25
Payer: MEDICARE

## 2024-01-25 VITALS
RESPIRATION RATE: 18 BRPM | WEIGHT: 186.2 LBS | DIASTOLIC BLOOD PRESSURE: 87 MMHG | HEART RATE: 73 BPM | SYSTOLIC BLOOD PRESSURE: 152 MMHG | TEMPERATURE: 97 F | HEIGHT: 73 IN | OXYGEN SATURATION: 96 % | BODY MASS INDEX: 24.68 KG/M2

## 2024-01-25 DIAGNOSIS — R05.9 COUGH, UNSPECIFIED TYPE: ICD-10-CM

## 2024-01-25 DIAGNOSIS — E11.49 OTHER DIABETIC NEUROLOGICAL COMPLICATION ASSOCIATED WITH TYPE 2 DIABETES MELLITUS (MULTI): ICD-10-CM

## 2024-01-25 DIAGNOSIS — Z12.5 SCREENING FOR PROSTATE CANCER: ICD-10-CM

## 2024-01-25 DIAGNOSIS — E78.49 OTHER HYPERLIPIDEMIA: ICD-10-CM

## 2024-01-25 DIAGNOSIS — R94.31 ABNORMAL EKG: ICD-10-CM

## 2024-01-25 DIAGNOSIS — I10 PRIMARY HYPERTENSION: Primary | ICD-10-CM

## 2024-01-25 DIAGNOSIS — Z79.4 TYPE 2 DIABETES MELLITUS WITHOUT COMPLICATION, WITH LONG-TERM CURRENT USE OF INSULIN (MULTI): ICD-10-CM

## 2024-01-25 DIAGNOSIS — E11.9 TYPE 2 DIABETES MELLITUS WITHOUT COMPLICATION, WITH LONG-TERM CURRENT USE OF INSULIN (MULTI): ICD-10-CM

## 2024-01-25 DIAGNOSIS — N52.9 ERECTILE DYSFUNCTION, UNSPECIFIED ERECTILE DYSFUNCTION TYPE: ICD-10-CM

## 2024-01-25 PROBLEM — E11.40 DIABETIC NEUROPATHY ASSOCIATED WITH TYPE 2 DIABETES MELLITUS (MULTI): Status: ACTIVE | Noted: 2024-01-25

## 2024-01-25 PROCEDURE — 3079F DIAST BP 80-89 MM HG: CPT | Performed by: FAMILY MEDICINE

## 2024-01-25 PROCEDURE — 3075F SYST BP GE 130 - 139MM HG: CPT | Performed by: FAMILY MEDICINE

## 2024-01-25 PROCEDURE — 99204 OFFICE O/P NEW MOD 45 MIN: CPT | Performed by: FAMILY MEDICINE

## 2024-01-25 PROCEDURE — 93000 ELECTROCARDIOGRAM COMPLETE: CPT | Performed by: FAMILY MEDICINE

## 2024-01-25 PROCEDURE — 4010F ACE/ARB THERAPY RXD/TAKEN: CPT | Performed by: FAMILY MEDICINE

## 2024-01-25 PROCEDURE — 1036F TOBACCO NON-USER: CPT | Performed by: FAMILY MEDICINE

## 2024-01-25 RX ORDER — LISINOPRIL 20 MG/1
20 TABLET ORAL DAILY
Qty: 90 TABLET | Refills: 3 | Status: SHIPPED | OUTPATIENT
Start: 2024-01-25

## 2024-01-25 RX ORDER — METFORMIN HYDROCHLORIDE 500 MG/1
500 TABLET, EXTENDED RELEASE ORAL
Qty: 30 TABLET | Refills: 11 | Status: SHIPPED | OUTPATIENT
Start: 2024-01-25 | End: 2025-01-24

## 2024-01-25 RX ORDER — POTASSIUM CHLORIDE 20 MEQ/1
20 TABLET, EXTENDED RELEASE ORAL DAILY
Qty: 90 TABLET | Refills: 3 | Status: SHIPPED | OUTPATIENT
Start: 2024-01-25

## 2024-01-25 RX ORDER — LISINOPRIL 20 MG/1
20 TABLET ORAL DAILY
COMMUNITY
Start: 2023-12-01 | End: 2024-01-25 | Stop reason: SDUPTHER

## 2024-01-25 RX ORDER — PREGABALIN 100 MG/1
CAPSULE ORAL
Qty: 90 CAPSULE | Refills: 3 | Status: CANCELLED | OUTPATIENT
Start: 2024-01-25

## 2024-01-25 RX ORDER — MELATONIN 1 MG/ML
LIQUID (ML) ORAL
COMMUNITY

## 2024-01-25 RX ORDER — ATORVASTATIN CALCIUM 20 MG/1
20 TABLET, FILM COATED ORAL DAILY
COMMUNITY
Start: 2023-12-02 | End: 2024-01-25 | Stop reason: SDUPTHER

## 2024-01-25 RX ORDER — PREGABALIN 100 MG/1
CAPSULE ORAL
COMMUNITY
Start: 2023-11-28 | End: 2024-01-25 | Stop reason: WASHOUT

## 2024-01-25 RX ORDER — BISMUTH SUBSALICYLATE 262 MG
1 TABLET,CHEWABLE ORAL DAILY
COMMUNITY

## 2024-01-25 RX ORDER — HUMAN INSULIN 100 [IU]/ML
24 INJECTION, SUSPENSION SUBCUTANEOUS
Qty: 3 ML | Refills: 3 | Status: SHIPPED | OUTPATIENT
Start: 2024-01-25 | End: 2024-04-04 | Stop reason: ALTCHOICE

## 2024-01-25 RX ORDER — SILDENAFIL 100 MG/1
1 TABLET, FILM COATED ORAL DAILY PRN
COMMUNITY
Start: 2022-07-28 | End: 2024-04-04 | Stop reason: WASHOUT

## 2024-01-25 RX ORDER — POTASSIUM CHLORIDE 20 MEQ/1
1 TABLET, EXTENDED RELEASE ORAL DAILY
COMMUNITY
Start: 2023-11-06 | End: 2024-01-25 | Stop reason: SDUPTHER

## 2024-01-25 RX ORDER — AMLODIPINE BESYLATE 2.5 MG/1
2.5 TABLET ORAL DAILY
Qty: 30 TABLET | Refills: 5 | Status: SHIPPED | OUTPATIENT
Start: 2024-01-25 | End: 2024-07-23

## 2024-01-25 RX ORDER — TADALAFIL 20 MG/1
20 TABLET ORAL DAILY
COMMUNITY
Start: 2024-01-23

## 2024-01-25 RX ORDER — HUMAN INSULIN 100 [IU]/ML
24 INJECTION, SUSPENSION SUBCUTANEOUS
COMMUNITY
Start: 2023-04-28 | End: 2024-01-25 | Stop reason: SDUPTHER

## 2024-01-25 RX ORDER — ATORVASTATIN CALCIUM 20 MG/1
20 TABLET, FILM COATED ORAL DAILY
Qty: 90 TABLET | Refills: 3 | Status: SHIPPED | OUTPATIENT
Start: 2024-01-25

## 2024-01-25 NOTE — ASSESSMENT & PLAN NOTE
Patient has been Lyrica in the past we will get opinion with neurology for management discussion and improving chronic symptoms with additional treatment

## 2024-01-25 NOTE — ASSESSMENT & PLAN NOTE
Review of lab testing shows lipids 11/22 with cholesterol 126 triglycerides 197 LDL 52.  Reviewed EKG with left axis deviation poor wave progression nonspecific ST segment changes   ms   ms  QTc 416MS

## 2024-01-25 NOTE — PATIENT INSTRUCTIONS
Please consider exercise program involving walking or some other form of aerobic activity 5 days weekly for 30 minutes... Let's also consider strengthening of large muscle groups like the abdominal muscles or shoulder muscles... Twice weekly with reps of 5/10/15 exercises and gradually increase strength.. This is not heavy strength training but light weight training... Sit ups or back exercise routine.. Please ask for handout if uncertain how to do..This  will help to strengthen your muscles which in turn will help you to lose weight.... You might ask what is the best diet available.. I would strongly encourage you to consider  Weight Watchers.. And as  your  fellow on  Weight Watchers physician attempting to  live this  LIFE  style  choice with you....  I will be glad to give you recommendations on what to eat.. Consider buying Rosie bread.., catrachito bagle thin bread.. oikos yogurt... eggs  to eat as hard-boiled... Halo top ice cream for snack... All these are delicious foods which.. when eaten and  being compliant eating three  meals daily  breakfast lunch and dinner, drinking  64 ounces of water daily we will all win together !!!!!!!. This will be a means for you to lose weight... Consider also the smart phone beatriz ... My plate.. Or My  fitness  pal..,  as additional possibilities for weight loss... Good  lucrosette Samson!    Discussed medication side effects.  The  risk benefits and treatment options  discussed with patient.       Please schedule follow-up appointment based upon your improvement/failure to improve/chronic medical conditions and physician recommendations during office appointment at the .       Patient advised to go to er if symptoms worsen or to call answering service, or to return to office for additional evaluation    This note was partially  generated using Dragon voice recognition and there may be incorrect words, wording, spelling, or pronunciation errors that were not  corrected prior to committing the note to the medical record.     Problem List Items Addressed This Visit       Type 2 diabetes mellitus without complication, with long-term current use of insulin (CMS/[facility])     Hemoglobin A1c 10.1 diabetes is uncontrolled l we will ask pharmacy to assist with insulin management and to   taper off dosage in addition we will restart metformin.  Patient is interested in seeing endocrinologist Adams needs to do so in light of need for possible Pap and also with idea to obtain CGM              Relevant Medications    insulin NPH and regular human (NovoLIN 70-30 FlexPen U-100) 100 unit/mL (70-30) injection    metFORMIN XR (Glucophage-XR) 500 mg 24 hr tablet    Other Relevant Orders    Follow Up In [facility] - Pharmacy    Referral to Endocrinology    CBC and Auto Differential    Comprehensive Metabolic Panel    Lipid Panel    Magnesium    Thyroid Stimulating Hormone    Albumin , Urine Random    Referral to Podiatry    Primary hypertension - Primary     Blood pressure is poorly controlled.  Will add amlodipine 2.5 mg daily to regimen         Relevant Medications    atorvastatin (Lipitor) 20 mg tablet    lisinopril 20 mg tablet    potassium chloride CR 20 mEq ER tablet    amLODIPine (Norvasc) 2.5 mg tablet    Other Relevant Orders    ECG 12 Lead    Other hyperlipidemia     Review of lab testing shows lipids 11/22 with cholesterol 126 triglycerides 197 LDL 52.  Reviewed EKG with left axis deviation poor wave progression nonspecific ST segment changes   ms   ms  QTc 416MS         Erectile dysfunction     Patient tadalafil.  In light of neuropathy that his feet may need additional assistance question need for implant to urology for opinion         Relevant Orders    Referral to Urology    Diabetic neuropathy associated with type 2 diabetes mellitus (CMS/[facility])     Patient has been Lyrica in the past we will get opinion with neurology for management discussion and improving  chronic symptoms with additional treatment         Relevant Orders    Referral to Neurology     Other Visit Diagnoses       Abnormal EKG        Relevant Orders    Referral to Cardiology    Screening for prostate cancer        Relevant Orders    Prostate Specific Antigen, Screen

## 2024-01-25 NOTE — ASSESSMENT & PLAN NOTE
Patient tadalafil.  In light of neuropathy that his feet may need additional assistance question need for implant to urology for opinion

## 2024-01-25 NOTE — PROGRESS NOTES
Patient is here for a recheck of diabetes.hgba1c  was  10.2     checks his blood sugars.  once debbie.ly . Highest sugar was 400   low sugar..    remains compliant on his medications.,,, Hypoglycemia has occurred rarely or never.... Symptoms do not include polydipsia, polyuria, blurred vision, numbness and tingling in the toes, increased appetite, weight gain, weight loss, infections or foot problems.. baby aspirin   81mg........    eye exam          feet problems.... thickened toe nail....      Previously saw dr martinez and placed on  pregabalin.. feet with needle pain in toes  and foot are freezing    Patient is also here for follow-up of hypertension.. Symptoms do not include headache confusion visual disturbance dizziness shortness of breath chest pain syncope or palpitations. Recent blood pressure patient has not been checking. By report there is good compliance with treatment. Pertinent medical history includes diabetes/hyperlipidemia     Patient is also here for follow-up of hyperlipidemia. The most recent measurements for this patient would take it on..  .... Associated symptoms do not include chest pain, Cramps with exertion, myalgias or nausea. Current treatment includes statins. By report there is good compliance with treatment. Pertinent medical history includes diabetes and hypertension     Neuropathy and failed gabapentin          Subjective   Patient ID: Wan Dunlap is a 63 y.o. male who presents for Establish Care.  HPI  Patient is here for a recheck of diabetes.hgba1c  was  10.2     checks his blood sugars.  once debbie.ly . Highest sugar was 400   low sugar..    remains compliant on his medications.,,, Hypoglycemia has occurred rarely or never.... Symptoms do not include polydipsia, polyuria, blurred vision, numbness and tingling in the toes, increased appetite, weight gain, weight loss, infections or foot problems.. baby aspirin   81mg........    eye exam          feet problems.... thickened toe  nail....      Previously saw dr martinez and placed on  pregabalin.. feet with needle pain in toes  and foot are freezing    Patient is also here for follow-up of hypertension.. Symptoms do not include headache confusion visual disturbance dizziness shortness of breath chest pain syncope or palpitations. Recent blood pressure patient has not been checking. By report there is good compliance with treatment. Pertinent medical history includes diabetes/hyperlipidemia     Patient is also here for follow-up of hyperlipidemia. The most recent measurements for this patient would take it on..  .... Associated symptoms do not include chest pain, Cramps with exertion, myalgias or nausea. Current treatment includes statins. By report there is good compliance with treatment. Pertinent medical history includes diabetes and hypertension     Neuropathy and failed gabapentin  Review of Systems  cardiovascular:  no  palpitations or chest  pain  respiratory: no  shortness  of  breath  endocrine:  no polydipsia,  no polyuria  musculoskeletal:  no  myalgia.. YES  arthralgia  All other  systems discussed  negative   Objective   Physical Exam  Vitals: I have reviewed the vitals  General: Well-developed.  In no acute distress.  Eyes:   sclera nonicteric.  Conjunctiva not injected.  No discharge.   HEAD: Normocephalic, atraumatic.  HEENT   Mucous membranes moist.  Posterior oropharynx nonerythematous, no tonsillar exudates.      No cervical lymphadenopathy.  Cardio: Regular rate and rhythm.  No murmur, rub or gallop.  Pulmonary: Lungs clear to auscultation in all fields.  No accessory muscle use.  GI/: Normal active bowel sounds.  Soft, nontender.  No masses or organomegaly appreciated.  Musculoskeletal: No gross deformities appreciated.  Foot exam with normal monofilament testing  Neuro: Alert, age-appropriate.  Normal muscle tone.  Moving all extremities.  Skin: No rash, bruises or lesions.   Labs        Assessment/Plan   Problem List  Items Addressed This Visit       Type 2 diabetes mellitus without complication, with long-term current use of insulin (CMS/[facility])     Hemoglobin A1c 10.1 diabetes is uncontrolled l we will ask pharmacy to assist with insulin management and to   taper off dosage in addition we will restart metformin.  Patient is interested in seeing endocrinologist Adams needs to do so in light of need for possible Pap and also with idea to obtain CGM              Relevant Medications    insulin NPH and regular human (NovoLIN 70-30 FlexPen U-100) 100 unit/mL (70-30) injection    metFORMIN XR (Glucophage-XR) 500 mg 24 hr tablet    Other Relevant Orders    Follow Up In [facility] - Pharmacy    Referral to Endocrinology    CBC and Auto Differential    Comprehensive Metabolic Panel    Lipid Panel    Magnesium    Thyroid Stimulating Hormone    Albumin , Urine Random    Referral to Podiatry    Primary hypertension - Primary     Blood pressure is poorly controlled.  Will add amlodipine 2.5 mg daily to regimen         Relevant Medications    atorvastatin (Lipitor) 20 mg tablet    lisinopril 20 mg tablet    potassium chloride CR 20 mEq ER tablet    amLODIPine (Norvasc) 2.5 mg tablet    Other Relevant Orders    ECG 12 Lead    Other hyperlipidemia     Review of lab testing shows lipids 11/22 with cholesterol 126 triglycerides 197 LDL 52.  Reviewed EKG with left axis deviation poor wave progression nonspecific ST segment changes   ms   ms  QTc 416MS         Erectile dysfunction     Patient tadalafil.  In light of neuropathy that his feet may need additional assistance question need for implant to urology for opinion         Relevant Orders    Referral to Urology    Diabetic neuropathy associated with type 2 diabetes mellitus (CMS/[facility])     Patient has been Lyrica in the past we will get opinion with neurology for management discussion and improving chronic symptoms with additional treatment         Relevant Orders    Referral  to Neurology     Other Visit Diagnoses       Abnormal EKG        Relevant Orders    Referral to Cardiology    Screening for prostate cancer        Relevant Orders    Prostate Specific Antigen, Screen

## 2024-01-25 NOTE — ASSESSMENT & PLAN NOTE
Hemoglobin A1c 10.1 diabetes is uncontrolled l we will ask pharmacy to assist with insulin management and to   taper off dosage in addition we will restart metformin.  Patient is interested in seeing endocrinologist Adams needs to do so in light of need for possible Pap and also with idea to obtain CGM

## 2024-02-07 ENCOUNTER — APPOINTMENT (OUTPATIENT)
Dept: PRIMARY CARE | Facility: CLINIC | Age: 64
End: 2024-02-07
Payer: MEDICARE

## 2024-02-08 ENCOUNTER — APPOINTMENT (OUTPATIENT)
Dept: UROLOGY | Facility: CLINIC | Age: 64
End: 2024-02-08
Payer: MEDICARE

## 2024-02-09 ENCOUNTER — TELEPHONE (OUTPATIENT)
Dept: PRIMARY CARE | Facility: CLINIC | Age: 64
End: 2024-02-09
Payer: MEDICARE

## 2024-02-09 NOTE — TELEPHONE ENCOUNTER
Rx Refill Request Telephone Encounter    Name:  Wan Dunlap  :  [id number]  Medication Name:  pregabalin  Dose : 100mg capsule  Route : oral  Frequency : Take 1 capsule by mouth twice daily and 2 caps at bedtime  Specific Pharmacy location:  [facility] - [address]

## 2024-02-20 ENCOUNTER — TELEMEDICINE (OUTPATIENT)
Dept: PHARMACY | Facility: HOSPITAL | Age: 64
End: 2024-02-20
Payer: MEDICARE

## 2024-02-20 DIAGNOSIS — E11.9 TYPE 2 DIABETES MELLITUS WITHOUT COMPLICATION, WITH LONG-TERM CURRENT USE OF INSULIN (MULTI): ICD-10-CM

## 2024-02-20 DIAGNOSIS — Z79.4 TYPE 2 DIABETES MELLITUS WITHOUT COMPLICATION, WITH LONG-TERM CURRENT USE OF INSULIN (MULTI): ICD-10-CM

## 2024-02-20 PROCEDURE — RXMED WILLOW AMBULATORY MEDICATION CHARGE

## 2024-02-20 RX ORDER — TIRZEPATIDE 2.5 MG/.5ML
2.5 INJECTION, SOLUTION SUBCUTANEOUS
Qty: 2 ML | Refills: 0 | Status: SHIPPED | OUTPATIENT
Start: 2024-02-20 | End: 2024-03-19 | Stop reason: ALTCHOICE

## 2024-02-20 NOTE — PROGRESS NOTES
Subjective   Patient ID: Wan Dunlap is a 63 y.o. male who presents for Diabetes (Referred by PCP for dmII management. ).Last A1c performed during office visit on 24 and was 10.2%. Blood sugars range 180-400 mg/dL. Most blood sugars throughout day are in mid 200-low 300 range. Says diet could be improved and exercise frequency.   Diabetes  He presents for his initial diabetic visit. He has type 2 diabetes mellitus. His disease course has been worsening. There are no hypoglycemic associated symptoms. There are no hypoglycemic complications. Symptoms are worsening. He participates in exercise intermittently. His home blood glucose trend is fluctuating dramatically. His overall blood glucose range is >200 mg/dl.     Current Pharmacotherapy for DMII:   Novolin 70/30: 24 units subcutaneous three times daily before meals  Meformin  m PO QAM    Test claims for SGLT2 and GLP-1RA (all covered under plan) below:                 Assessment/Plan   Problem List Items Addressed This Visit       Type 2 diabetes mellitus without complication, with long-term current use of insulin (CMS/[facility])    Relevant Medications    tirzepatide (Mounjaro) 2.5 mg/0.5 mL pen injector    Other Relevant Orders    Follow Up In [facility] - Pharmacy       LAB RESULTS:  Lab Results   Component Value Date    HGBA1C 9.3 (H) 2023    HGBA1C 8.8 (H) 2023    HGBA1C 9.5 (H) 2022     Continue all meds under the continuation of care with the referring provider and clinical pharmacy team.    1. DMII uncontrolled, renal function normal, no hx of pancrantitis or medullary thyroid cancer. Goal A1c <7%.    A. Start Mounjaro 2.5 mg weekly; rx Sent to [facility]. Patient to monitor weight regulary     2. Continue Novolin and metformin at current dose for time being; will consider increase metformin or sub SGLT2 at future follow-up.

## 2024-02-22 ENCOUNTER — PHARMACY VISIT (OUTPATIENT)
Dept: PHARMACY | Facility: CLINIC | Age: 64
End: 2024-02-22
Payer: COMMERCIAL

## 2024-02-27 DIAGNOSIS — E78.2 MIXED HYPERLIPIDEMIA: ICD-10-CM

## 2024-02-27 RX ORDER — ATORVASTATIN CALCIUM 20 MG/1
TABLET, FILM COATED ORAL
Qty: 90 TABLET | Refills: 1 | Status: SHIPPED | OUTPATIENT
Start: 2024-02-27

## 2024-03-14 ENCOUNTER — APPOINTMENT (OUTPATIENT)
Dept: UROLOGY | Facility: CLINIC | Age: 64
End: 2024-03-14
Payer: MEDICARE

## 2024-03-19 ENCOUNTER — TELEMEDICINE (OUTPATIENT)
Dept: PHARMACY | Facility: HOSPITAL | Age: 64
End: 2024-03-19
Payer: MEDICARE

## 2024-03-19 DIAGNOSIS — E11.9 TYPE 2 DIABETES MELLITUS WITHOUT COMPLICATION, WITH LONG-TERM CURRENT USE OF INSULIN (MULTI): ICD-10-CM

## 2024-03-19 DIAGNOSIS — Z79.4 TYPE 2 DIABETES MELLITUS WITHOUT COMPLICATION, WITH LONG-TERM CURRENT USE OF INSULIN (MULTI): ICD-10-CM

## 2024-03-19 PROCEDURE — RXMED WILLOW AMBULATORY MEDICATION CHARGE

## 2024-03-19 RX ORDER — TIRZEPATIDE 5 MG/.5ML
5 INJECTION, SOLUTION SUBCUTANEOUS
Qty: 2 ML | Refills: 0 | Status: SHIPPED | OUTPATIENT
Start: 2024-03-19 | End: 2024-04-16 | Stop reason: SDUPTHER

## 2024-03-19 RX ORDER — BLOOD-GLUCOSE SENSOR
EACH MISCELLANEOUS
Qty: 6 EACH | Refills: 3 | Status: SHIPPED | OUTPATIENT
Start: 2024-03-19 | End: 2024-04-04 | Stop reason: ALTCHOICE

## 2024-03-19 ASSESSMENT — ENCOUNTER SYMPTOMS: DIABETIC ASSOCIATED SYMPTOMS: 0

## 2024-03-19 NOTE — PROGRESS NOTES
Subjective   Patient ID: Wan Dunlap is a 63 y.o. male who presents for No chief complaint on file.. Blood sugar range 180 to 250 mg. Finished 4th injection Mounjaro; tolerated well, did get sick last week(n/v/d),but feels unrelated to medication. Lost 5 lbs since starting. Blood glucose in the 220's most times he checks; BG check is random.     Freestyle jo-ann or dexcom preference for freestyle  Diabetes  He presents for his follow-up diabetic visit. He has type 2 diabetes mellitus. His disease course has been improving. There are no hypoglycemic associated symptoms. There are no diabetic associated symptoms. There are no hypoglycemic complications. Symptoms are improving. There are no diabetic complications. He is compliant with treatment all of the time. His weight is decreasing steadily. His home blood glucose trend is decreasing steadily.         Assessment/Plan   Problem List Items Addressed This Visit       Type 2 diabetes mellitus without complication, with long-term current use of insulin (CMS/[facility])       LAB RESULTS:  Lab Results   Component Value Date    HGBA1C 9.3 (H) 09/13/2023    HGBA1C 8.8 (H) 03/08/2023    HGBA1C 9.5 (H) 11/23/2022       Continue all meds under the continuation of care with the referring provider and clinical pharmacy team.    DMII uncontrolled; BG slowly trending down.  -Increase Mounjaro 2.5 mg weekly to 5 mg weekly; follow-up 1 month  Continue Metformin 500 mg daily and uptitrate to 1000 mg/day as tolerated--when diarrhea from illness gone.   Start Freestyle jo-ann when received

## 2024-03-19 NOTE — PATIENT INSTRUCTIONS
Mounjaro Education:     Counseled patient on Mounjaro MOA, expectations, side effects, duration of therapy, administration, and monitoring parameters.  Provided detailed dosing and administration counseling to ensure proper technique.   Reviewed Mounjaro titration schedule, starting with 2.5 mg once weekly to a goal of 15 mg once weekly if tolerated  Counseled patient on the benefits of GLP-1ra glycemic control and weight loss  Reviewed storage requirements of Mounjaro when not in use, and when to administer the medication if a dose is missed.  Advised patient that they may experience improved satiety after meals and portion sizes of meals may be reduced as doses of Mounjaro increase.

## 2024-03-25 ENCOUNTER — PHARMACY VISIT (OUTPATIENT)
Dept: PHARMACY | Facility: CLINIC | Age: 64
End: 2024-03-25
Payer: COMMERCIAL

## 2024-04-04 ENCOUNTER — OFFICE VISIT (OUTPATIENT)
Dept: NEUROLOGY | Facility: CLINIC | Age: 64
End: 2024-04-04
Payer: MEDICARE

## 2024-04-04 VITALS
BODY MASS INDEX: 23.83 KG/M2 | HEART RATE: 85 BPM | SYSTOLIC BLOOD PRESSURE: 143 MMHG | WEIGHT: 179.8 LBS | HEIGHT: 73 IN | DIASTOLIC BLOOD PRESSURE: 90 MMHG

## 2024-04-04 DIAGNOSIS — E11.42 DIABETIC POLYNEUROPATHY ASSOCIATED WITH TYPE 2 DIABETES MELLITUS (MULTI): Primary | ICD-10-CM

## 2024-04-04 LAB
AMPHETAMINES UR QL SCN: NORMAL
BARBITURATES UR QL SCN: NORMAL
BENZODIAZ UR QL SCN: NORMAL
BZE UR QL SCN: NORMAL
CANNABINOIDS UR QL SCN: NORMAL
FENTANYL+NORFENTANYL UR QL SCN: NORMAL
METHADONE UR QL SCN: NORMAL
OPIATES UR QL SCN: NORMAL
OXYCODONE+OXYMORPHONE UR QL SCN: NORMAL
PCP UR QL SCN: NORMAL

## 2024-04-04 PROCEDURE — 4010F ACE/ARB THERAPY RXD/TAKEN: CPT | Performed by: STUDENT IN AN ORGANIZED HEALTH CARE EDUCATION/TRAINING PROGRAM

## 2024-04-04 PROCEDURE — 1036F TOBACCO NON-USER: CPT | Performed by: STUDENT IN AN ORGANIZED HEALTH CARE EDUCATION/TRAINING PROGRAM

## 2024-04-04 PROCEDURE — 3077F SYST BP >= 140 MM HG: CPT | Performed by: STUDENT IN AN ORGANIZED HEALTH CARE EDUCATION/TRAINING PROGRAM

## 2024-04-04 PROCEDURE — 3080F DIAST BP >= 90 MM HG: CPT | Performed by: STUDENT IN AN ORGANIZED HEALTH CARE EDUCATION/TRAINING PROGRAM

## 2024-04-04 PROCEDURE — 80307 DRUG TEST PRSMV CHEM ANLYZR: CPT

## 2024-04-04 PROCEDURE — G2211 COMPLEX E/M VISIT ADD ON: HCPCS | Performed by: STUDENT IN AN ORGANIZED HEALTH CARE EDUCATION/TRAINING PROGRAM

## 2024-04-04 PROCEDURE — 99204 OFFICE O/P NEW MOD 45 MIN: CPT | Performed by: STUDENT IN AN ORGANIZED HEALTH CARE EDUCATION/TRAINING PROGRAM

## 2024-04-04 RX ORDER — ICOSAPENT ETHYL 1 G/1
2 CAPSULE ORAL 2 TIMES DAILY
COMMUNITY
Start: 2023-03-08 | End: 2024-04-04 | Stop reason: WASHOUT

## 2024-04-04 RX ORDER — FUROSEMIDE 20 MG/1
1 TABLET ORAL DAILY
COMMUNITY
Start: 2023-11-06 | End: 2024-04-04 | Stop reason: WASHOUT

## 2024-04-04 ASSESSMENT — PATIENT HEALTH QUESTIONNAIRE - PHQ9
1. LITTLE INTEREST OR PLEASURE IN DOING THINGS: NOT AT ALL
SUM OF ALL RESPONSES TO PHQ9 QUESTIONS 1 & 2: 0
2. FEELING DOWN, DEPRESSED OR HOPELESS: NOT AT ALL

## 2024-04-04 NOTE — PROGRESS NOTES
"Subjective     Wan Dunlap is a right handed  63 y.o. year old male who presents with New Patient Visit (NPV- Neuropathy travels from toes to top of feet.).   Visit type: new patient visit     Numbness of his right big toe started 5 years ago, then within a year began to involve left toe, and more recently has slowly spread to bilateral distal feet. He feels both dorsums feel cold all the time. He has pins/needles in the same distribution, as well as sharp pain. No walking, bowel or bladder issues, or focal weakness.    He has been diabetic for > 6 years. He drinks 6 beers per week. 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     Takes gabapentin 300 mg QID - helps partly, no side effects    Previously, pregabalin 100 mg QID - helped symptoms by about 50%.    Patient Active Problem List   Diagnosis    Type 2 diabetes mellitus without complication, with long-term current use of insulin (CMS/[facility])    Primary hypertension    Other hyperlipidemia    Erectile dysfunction    Diabetic neuropathy associated with type 2 diabetes mellitus (CMS/[facility])      Past Medical History:   Diagnosis Date    Diabetes mellitus (CMS/[facility])     Hypertension       Past Surgical History:   Procedure Laterality Date    ACHILLES TENDON SURGERY      KNEE SURGERY      TONSILLECTOMY      WRIST SURGERY        Social History     Socioeconomic History    Marital status:      Spouse name: Not on file    Number of children: Not on file    Years of education: Not on file    Highest education level: Not on file   Occupational History    Not on file   Tobacco Use    Smoking status: Never    Smokeless tobacco: Never   Vaping Use    Vaping Use: Never used   Substance and Sexual Activity    Alcohol use: Yes     Alcohol/week: 20.0 standard drinks of alcohol     Types: 20 Cans of beer per week    Drug use: Yes     Types: Marijuana    Sexual activity: Defer   Other Topics Concern    Not on file   Social History " "Narrative    Not on file     Social Determinants of Health     Financial Resource Strain: Not on file   Food Insecurity: Not on file   Transportation Needs: Not on file   Physical Activity: Not on file   Stress: Not on file   Social Connections: Not on file   Intimate Partner Violence: Not on file   Housing Stability: Not on file      Family History   Problem Relation Name Age of Onset    Heart attack Mother      Cataracts Father      Heart attack Brother        Patient Health Questionnaire-2 Score: 0          Review of Systems  All other system have been reviewed and are negative for complaint.    Vitals:    04/04/24 0851   BP: 143/90   BP Location: Right arm   Patient Position: Sitting   BP Cuff Size: Adult   Pulse: 85   Weight: 81.6 kg (179 lb 12.8 oz)   Height: 1.854 m (6' 1\")     Objective   Neurological Exam  Mental Status  Awake, alert and oriented to person, place and time. Speech is normal. Language is fluent with no aphasia. Attention and concentration are normal.    Cranial Nerves  CN II: Visual fields full to confrontation.  CN III, IV, VI: Extraocular movements intact bilaterally. Normal saccades. Normal smooth pursuit. Normal lids and orbits bilaterally. Pupils equal round and reactive to light bilaterally.  CN V:  Right: Facial sensation is normal.  Left: Facial sensation is normal on the left.  CN VII: Full and symmetric facial movement.  CN VIII: Hearing is normal.  CN IX, X: Palate elevates symmetrically  CN XI: Shoulder shrug strength is normal.  CN XII: Tongue midline without atrophy or fasciculations.    Motor  Normal muscle bulk throughout. No fasciculations present. Normal muscle tone. No abnormal involuntary movements.                                               Right                     Left   Shoulder abduction               5                          5  Elbow flexion                         5                          5  Elbow extension                    5                          " 5  Finger flexion                         5                          5  Finger extension                    5                          5  Finger abduction                    5                          5  Hip flexion                              5                          5  Knee flexion                           5                          5  Plantarflexion                         5                          5  Dorsiflexion                            5                          5    Sensory  Light touch is normal in upper and lower extremities. Reduced pinprick over distal feet. Normal vibration in distal lower extremities.     Reflexes                                            Right                      Left  Biceps                                 2+                         2+  Triceps                                2+                         2+  Patellar                                2+                         2+  Achilles                                2+                         2+  Right Plantar: downgoing  Left Plantar: downgoing    Right pathological reflexes: Allen's absent.  Left pathological reflexes: Allen's absent.    Coordination  Right: Finger-to-nose normal. Heel-to-shin normal.Left: Finger-to-nose normal. Heel-to-shin normal.    Gait  Casual gait is normal including stance, stride, and arm swing. Normal tandem gait. Romberg is absent.                          Hemoglobin A1C   Date Value Ref Range Status   09/13/2023 9.3 (H) 4.8 - 5.9 % Final           Assessment/Plan   Diagnoses and all orders for this visit:  Diabetic polyneuropathy associated with type 2 diabetes mellitus (CMS/HCC)  -     Vitamin B12; Future  -     Methylmalonic Acid; Future  -     Serum Protein Electrophoresis + Immunofixation; Future  -     Drug Screen, Urine With Reflex to Confirmation; Future    Wan Dunlap is a 63 y.o. year old male with DM2 since 2018, HTN, HLD, here for evaluation of distal numbness and painful  paresthesia likely related to mild diabetic polyneuropathy.    We discussed the following plan today:   Bloodwork  Urine drug screen  Will plan to start pregabalin for neuropathic pain. Possible side effects include drowsiness, swelling  Return in 6 months

## 2024-04-04 NOTE — PATIENT INSTRUCTIONS
Thank you for your visit today. You were seen by Dr. Clark for diabetic neuropathy. If you have any questions or need to reach me, call my office at [phone].    We discussed the following plan today:   Bloodwork  Urine drug screen  Will plan to start pregabalin for neuropathic pain. Possible side effects include drowsiness, swelling  Stop gabapentin  Return in 6 months

## 2024-04-05 RX ORDER — PREGABALIN 200 MG/1
CAPSULE ORAL
Qty: 60 CAPSULE | Refills: 5 | Status: SHIPPED | OUTPATIENT
Start: 2024-04-05

## 2024-04-05 RX ORDER — PREGABALIN 75 MG/1
CAPSULE ORAL
Qty: 42 CAPSULE | Refills: 0 | Status: SHIPPED | OUTPATIENT
Start: 2024-04-05

## 2024-04-16 ENCOUNTER — TELEMEDICINE (OUTPATIENT)
Dept: PHARMACY | Facility: HOSPITAL | Age: 64
End: 2024-04-16
Payer: MEDICARE

## 2024-04-16 DIAGNOSIS — Z79.4 TYPE 2 DIABETES MELLITUS WITHOUT COMPLICATION, WITH LONG-TERM CURRENT USE OF INSULIN (MULTI): ICD-10-CM

## 2024-04-16 DIAGNOSIS — E11.9 TYPE 2 DIABETES MELLITUS WITHOUT COMPLICATION, WITH LONG-TERM CURRENT USE OF INSULIN (MULTI): ICD-10-CM

## 2024-04-16 PROCEDURE — RXMED WILLOW AMBULATORY MEDICATION CHARGE

## 2024-04-16 RX ORDER — TIRZEPATIDE 5 MG/.5ML
5 INJECTION, SOLUTION SUBCUTANEOUS
Qty: 2 ML | Refills: 2 | Status: SHIPPED | OUTPATIENT
Start: 2024-04-16

## 2024-04-16 ASSESSMENT — ENCOUNTER SYMPTOMS: DIABETIC ASSOCIATED SYMPTOMS: 0

## 2024-04-16 NOTE — PROGRESS NOTES
Subjective   Patient ID: Wan Dunlap is a 63 y.o. male who presents for Follow-up (DMII).  Diabetes  His disease course has been improving. There are no diabetic associated symptoms. There are no hypoglycemic complications. Symptoms are stable. He is compliant with treatment most of the time. His home blood glucose trend is decreasing steadily. His overall blood glucose range is 140-180 mg/dl.       Recent A1c: 7.5%; Tolerating Mounjaro well; appetite suppressed and eating and weight slowly coming off. Not engaging in drinking on weekends as much due to appetite suppression. Happy with results and tolerability. Does have nausea the next day after using sildenafil but not a major limitation. Following endo and they may made some changes to his regimen (alternative for metformin, but patient cannot recall). Discussed SGLT2 and how this writer can help with access if ordered by endo.       Assessment/Plan   Problem List Items Addressed This Visit       Type 2 diabetes mellitus without complication, with long-term current use of insulin (Multi)    Relevant Medications    tirzepatide (Mounjaro) 5 mg/0.5 mL pen injector    Other Relevant Orders    Follow Up In [facility] - Pharmacy       LAB RESULTS:  Lab Results   Component Value Date    HGBA1C 9.3 (H) 09/13/2023    HGBA1C 8.8 (H) 03/08/2023    HGBA1C 9.5 (H) 11/23/2022       Continue all meds under the continuation of care with the referring provider and clinical pharmacy team.    Mounjaro 5 mg weekly; following endo may change metformin for another agent (SGLT2); Will continue to help patient with access to mounjaro; patient to call sooner if needed.

## 2024-04-17 ENCOUNTER — PHARMACY VISIT (OUTPATIENT)
Dept: PHARMACY | Facility: CLINIC | Age: 64
End: 2024-04-17
Payer: COMMERCIAL

## 2024-04-30 ENCOUNTER — APPOINTMENT (OUTPATIENT)
Dept: NEUROLOGY | Facility: CLINIC | Age: 64
End: 2024-04-30
Payer: MEDICARE

## 2024-05-20 ENCOUNTER — PHARMACY VISIT (OUTPATIENT)
Dept: PHARMACY | Facility: CLINIC | Age: 64
End: 2024-05-20
Payer: COMMERCIAL

## 2024-05-20 PROCEDURE — RXMED WILLOW AMBULATORY MEDICATION CHARGE

## 2024-05-25 DIAGNOSIS — N52.8 OTHER MALE ERECTILE DYSFUNCTION: ICD-10-CM

## 2024-05-28 RX ORDER — TADALAFIL 20 MG/1
20 TABLET ORAL DAILY
Qty: 30 TABLET | Refills: 0 | Status: SHIPPED | OUTPATIENT
Start: 2024-05-28

## 2024-06-12 ENCOUNTER — PHARMACY VISIT (OUTPATIENT)
Dept: PHARMACY | Facility: CLINIC | Age: 64
End: 2024-06-12
Payer: COMMERCIAL

## 2024-06-12 PROCEDURE — RXMED WILLOW AMBULATORY MEDICATION CHARGE

## 2024-07-09 ENCOUNTER — APPOINTMENT (OUTPATIENT)
Dept: PHARMACY | Facility: HOSPITAL | Age: 64
End: 2024-07-09
Payer: MEDICARE

## 2024-07-09 DIAGNOSIS — Z79.4 TYPE 2 DIABETES MELLITUS WITHOUT COMPLICATION, WITH LONG-TERM CURRENT USE OF INSULIN (MULTI): ICD-10-CM

## 2024-07-09 DIAGNOSIS — E11.9 TYPE 2 DIABETES MELLITUS WITHOUT COMPLICATION, WITH LONG-TERM CURRENT USE OF INSULIN (MULTI): ICD-10-CM

## 2024-07-09 RX ORDER — TIRZEPATIDE 5 MG/.5ML
5 INJECTION, SOLUTION SUBCUTANEOUS
Qty: 2 ML | Refills: 2 | Status: SHIPPED | OUTPATIENT
Start: 2024-07-09

## 2024-07-09 ASSESSMENT — ENCOUNTER SYMPTOMS: DIABETIC ASSOCIATED SYMPTOMS: 0

## 2024-07-09 NOTE — PROGRESS NOTES
"Subjective   Patient ID: Wan Dunlap is a 64 y.o. male who presents for Follow-up (DMII). Tolerating Mounjaro well and reports \"I think this is the right dose for me). No reported changes to diet/exercise.   Diabetes  He presents for his follow-up diabetic visit. He has type 2 diabetes mellitus. His disease course has been stable. (\"Feels funny\" when blood sugars go to around 100 mg/dL but no lows below 70 mg/dL. ) There are no diabetic associated symptoms. There are no hypoglycemic complications. Symptoms are stable.       Currently taking:     Mounjaro 5 mg weekly  2.   Sulfonylurea (patient unsure and endo is out of network); patient thinks it glipizide but is unsure of dose and will call back and leave a vm with drug name/dose/directions. No longer taking metformin.   No major changes to weight; is currently 185 lbs and has put on a couple of pounds since follow-up. Last A1c 7.5 % at [facility] (out of network).     Assessment/Plan   Problem List Items Addressed This Visit       Type 2 diabetes mellitus without complication, with long-term current use of insulin (Multi)    Relevant Medications    tirzepatide (Mounjaro) 5 mg/0.5 mL pen injector    Other Relevant Orders    Follow Up In [facility] - Pharmacy       LAB RESULTS:  Lab Results   Component Value Date    HGBA1C 9.3 (H) 09/13/2023    HGBA1C 8.8 (H) 03/08/2023    HGBA1C 9.5 (H) 11/23/2022         Continue all meds under the continuation of care with the referring provider and clinical pharmacy team.    Patient looking for new endo as his current one is out of network and his insurance does not cover. Mounjaro 5 mg reordered with 2 refills and will be mailed from [facility]. Follow-up 3 months; sooner if needed.     "

## 2024-07-10 PROCEDURE — RXMED WILLOW AMBULATORY MEDICATION CHARGE

## 2024-07-13 ENCOUNTER — PHARMACY VISIT (OUTPATIENT)
Dept: PHARMACY | Facility: CLINIC | Age: 64
End: 2024-07-13
Payer: COMMERCIAL

## 2024-07-31 ENCOUNTER — OFFICE VISIT (OUTPATIENT)
Dept: PRIMARY CARE | Facility: CLINIC | Age: 64
End: 2024-07-31
Payer: MEDICARE

## 2024-07-31 VITALS
DIASTOLIC BLOOD PRESSURE: 86 MMHG | WEIGHT: 182 LBS | OXYGEN SATURATION: 96 % | SYSTOLIC BLOOD PRESSURE: 133 MMHG | RESPIRATION RATE: 18 BRPM | BODY MASS INDEX: 24.12 KG/M2 | TEMPERATURE: 97 F | HEIGHT: 73 IN | HEART RATE: 57 BPM

## 2024-07-31 DIAGNOSIS — Z79.4 TYPE 2 DIABETES MELLITUS WITHOUT COMPLICATION, WITH LONG-TERM CURRENT USE OF INSULIN (MULTI): Primary | ICD-10-CM

## 2024-07-31 DIAGNOSIS — E11.9 TYPE 2 DIABETES MELLITUS WITHOUT COMPLICATION, WITH LONG-TERM CURRENT USE OF INSULIN (MULTI): Primary | ICD-10-CM

## 2024-07-31 DIAGNOSIS — M54.31 SCIATICA OF RIGHT SIDE: ICD-10-CM

## 2024-07-31 DIAGNOSIS — N52.9 ERECTILE DYSFUNCTION, UNSPECIFIED ERECTILE DYSFUNCTION TYPE: ICD-10-CM

## 2024-07-31 PROCEDURE — 1036F TOBACCO NON-USER: CPT | Performed by: FAMILY MEDICINE

## 2024-07-31 PROCEDURE — 3008F BODY MASS INDEX DOCD: CPT | Performed by: FAMILY MEDICINE

## 2024-07-31 PROCEDURE — 3075F SYST BP GE 130 - 139MM HG: CPT | Performed by: FAMILY MEDICINE

## 2024-07-31 PROCEDURE — 3079F DIAST BP 80-89 MM HG: CPT | Performed by: FAMILY MEDICINE

## 2024-07-31 PROCEDURE — 4010F ACE/ARB THERAPY RXD/TAKEN: CPT | Performed by: FAMILY MEDICINE

## 2024-07-31 PROCEDURE — 99214 OFFICE O/P EST MOD 30 MIN: CPT | Performed by: FAMILY MEDICINE

## 2024-07-31 RX ORDER — SILDENAFIL 100 MG/1
100 TABLET, FILM COATED ORAL AS NEEDED
Qty: 4 TABLET | Refills: 11 | Status: SHIPPED | OUTPATIENT
Start: 2024-07-31 | End: 2024-08-12

## 2024-07-31 RX ORDER — TIZANIDINE 4 MG/1
4 TABLET ORAL EVERY 6 HOURS PRN
Qty: 30 TABLET | Refills: 0 | Status: SHIPPED | OUTPATIENT
Start: 2024-07-31 | End: 2024-08-10

## 2024-07-31 NOTE — ASSESSMENT & PLAN NOTE
Lets check x-ray of lumbar spine and back strengthening exercises discussed will try Rx for muscle relaxant at bedtime i.e. tizanidine 4 mg tablets use nighttime as needed/ice and strengthening exercise consider PT

## 2024-07-31 NOTE — ASSESSMENT & PLAN NOTE
Patient is here for a recheck of diabetes.hgba1c  was  6.3     checks his blood sugars.  once debbie.ly . Highest sugar was 220  low sugar.. 78   remains compliant on his medications.,,, Hypoglycemia has occurred rarely or never.... Symptoms do not include polydipsia, polyuria,   ++blurred vision,   SOMETIMES SHAKY..   YESnumbness and tingling in the toes, DEcreased appetite, weight gain, YES weight loss, infections or   YES foot problems..  ON  LYRICA.. baby aspirin   81mg........    eye exam   2024  NORMAL   EXAM        feet problems.... SORE RIGHT  2ND TOE    .  stable and continue meds .  Continue meds and monitor for hypoglycemia or recommendations from /Rodolfo Cuevas.

## 2024-07-31 NOTE — PROGRESS NOTES
Subjective   Patient ID: Wan Dunlap is a 64 y.o. male who presents for Leg Pain.  HPI  Patient is here for a recheck of diabetes.hgba1c  was  6.3     checks his blood sugars.  once debbie.ly . Highest sugar was 220  low sugar.. 78   remains compliant on his medications.,,, Hypoglycemia has occurred rarely or never.... Symptoms do not include polydipsia, polyuria,   ++blurred vision,   SOMETIMES SHAKY..   YESnumbness and tingling in the toes, DEcreased appetite, weight gain, YES weight loss, infections or   YES foot problems..  ON  LYRICA.. baby aspirin   81mg........    eye exam   2024  NORMAL   EXAM        feet problems.... SORE RIGHT  2ND TOE    ..  LEG  PAIN      ... AND BACK PAIN  ... SCIATICA    RIGHT  ERECTILE DYSFUNCTION   AND  SILDENAFIL  HELPED  ... CAN  GET VIAGRA 100  MG   .CIALIS  DOESN'T WORK  ALCOHOL     TRYING TO DECREASE  Review of Systems  cardiovascular:  no  palpitations or chest  pain  respiratory: no  shortness  of  breath  endocrine:  no polydipsia,  no polyuria  musculoskeletal:  no  myalgia.. no arthralgia  All other  systems discussed  negative   Objective   Physical Exam  general: alert oriented x three  HEENT hearing normal to voice  Neck supple  Lungs respirations non-labored.  Cardiovascular: no peripheral edema  Skin: warm and dry without rash  Psych: judgement and insight normal  Musculoskeletal:  ambulation normal,  BACK  WITH  TENDERNESS OF RIGHT SACROILIAC SPINE  ST  LEG RAISNG    RIGHT  AT     15   DEGREES   lymph:negative cervical  LYMPADENOPATHY  thyroid: non palpable enlargement   LabsReview of lab testing shows pending labs per Dr. Yuen with pending labs from self as well with outstanding improvement hemoglobin A1c was 9.39/23        Assessment/Plan   Problem List Items Addressed This Visit       Type 2 diabetes mellitus without complication, with long-term current use of insulin (Multi) - Primary     Patient is here for a recheck of diabetes.hgba1c  was  6.3     checks his  blood sugars.  once debbie.ly . Highest sugar was 220  low sugar.. 78   remains compliant on his medications.,,, Hypoglycemia has occurred rarely or never.... Symptoms do not include polydipsia, polyuria,   ++blurred vision,   SOMETIMES SHAKY..   YESnumbness and tingling in the toes, DEcreased appetite, weight gain, YES weight loss, infections or   YES foot problems..  ON  LYRICA.. baby aspirin   81mg........    eye exam   2024  NORMAL   EXAM        feet problems.... SORE RIGHT  2ND TOE    .  stable and continue meds .  Continue meds and monitor for hypoglycemia or recommendations from /Rodolfo Cuevas.         Erectile dysfunction     Erectile dysfunction lets use sildenafil/Viagra 100 mg as directed         Relevant Medications    sildenafil (Viagra) 100 mg tablet    Sciatica of right side     Lets check x-ray of lumbar spine and back strengthening exercises discussed will try Rx for muscle relaxant at bedtime i.e. tizanidine 4 mg tablets use nighttime as needed/ice and strengthening exercise consider PT         Relevant Medications    tiZANidine (Zanaflex) 4 mg tablet    Other Relevant Orders    XR lumbar spine 2-3 views    Referral to Physical Therapy

## 2024-07-31 NOTE — PATIENT INSTRUCTIONS

## 2024-08-07 PROCEDURE — RXMED WILLOW AMBULATORY MEDICATION CHARGE

## 2024-08-09 ENCOUNTER — PHARMACY VISIT (OUTPATIENT)
Dept: PHARMACY | Facility: CLINIC | Age: 64
End: 2024-08-09
Payer: COMMERCIAL

## 2024-08-14 NOTE — PROGRESS NOTES
Physical Therapy    Physical Therapy Evaluation and Treatment      Patient Name: Wan Dunlap  MRN: [id number]  Today's Date: 8/15/2024    Time Entry:   Time Calculation  Start Time: 0850  Stop Time: 0927  Time Calculation (min): 37 min  PT Evaluation Time Entry  PT Evaluation (Low) Time Entry: 24  PT Therapeutic Procedures Time Entry  Therapeutic Exercise Time Entry: 13                 Insurance:  [employer]  30% coinsurance; $15 copay  PA req  10 V approved   8/8/24-11/8/24  Visit: 1  POC: 10    Assessment:  63 y/o M presents to outpatient physical therapy with reports of low back and right lower extremity pain d/t lumbar radiculopathy. The patient presents with the current impairments of pain, decreased ROM, weakness, impaired mobility, and increased muscular tension. These impairments currently limit their ability to turn, walk, negotiate stairs, bend, lift, and sleep. Due to the limitations listed above, the patient is currently at a decreased functional level compared to baseline, and they would benefit from skilled physical therapy to improve pain intensity, strength, flexibility, mobility, muscle tension, and facilitate a safe and efficient return to functional baseline. Patient's prognosis for improvement with therapy is good at this time.  PT Assessment  PT Assessment Results: Decreased strength, Decreased range of motion, Decreased mobility, Pain  Rehab Prognosis: Good  Evaluation/Treatment Tolerance: Patient tolerated treatment well     Plan:  OP PT Plan  Treatment/Interventions: Cryotherapy, Dry needling, Education/ Instruction, Electrical stimulation, Hot pack, Manual therapy, Mechanical traction, Neuromuscular re-education, Self care/ home management, Taping techniques, Therapeutic activities, Therapeutic exercises, Ultrasound  PT Plan: Skilled PT  PT Frequency:  (1-2x/week)  Duration: 9 additional visits  Onset Date: 07/31/24  Certification Period Start Date: 08/15/24  Certification Period  End Date: 11/13/24  Number of Treatments Authorized: 10  Rehab Potential: Good  Plan of Care Agreement: Patient      Complexity:  Low    Current Problem:   1. Sciatica of right side  Referral to Physical Therapy    Follow Up In Physical Therapy          Subjective    Patient reports to OPPT with complaints of low back and right lower extremity pain for the last month or so. Back pain resolved about a week after it started, but the leg pain has continued. He notes that he went on vacation and was walking a lot and then woke up with the back and low back pain. Pain at this time is a 7/10, 9/10 at worst, 2/10 at best. Increased pain with turning, walking, going down stairs, bending, lifting, and sleeping. Denies numbness and tingling. Negotiating stairs with step to pattern.   General:  General  Reason for Referral: Low back and right lower extremity pain  Referred By: Dr. Thomas Samson  Past Medical History Relevant to Rehab: hx of diabetes, HTN, neuropathy, torn achilles tendon  Precautions:  Precautions  STEADI Fall Risk Score (The score of 4 or more indicates an increased risk of falling): 0  Precautions Comment: hx of diabetes, HTN, neuropathy  Pain:  Pain Assessment  Pain Assessment: 0-10  0-10 (Numeric) Pain Score: 7  Pain Type: Acute pain  Pain Location: Leg  Pain Orientation: Right  Pain Radiating Towards: mid calf  Pain Descriptors: Burning  Clinical Progression: Gradually worsening  Home Living:  Home Living  Home Living Comment: One story home with a basement, 2 ROBB  Prior Level of Function:  Prior Function Per Pt/Caregiver Report  Prior Function Comments: Inedpendent in all ADLs and desired activites    Objective   Lumbar AROM:  Flexion to knees*  Extension limited 50%*  RSB to mid thigh*  LSB to distal thigh    LE Strength:  Hip flexion R 4/5*, L 4+/5  Hip abd R 4/5, L 4+/5  Hip ext R 4-/5*, L 4/5  Knee flexion R 4/5, L 5/5  Knee extension R 4+/5, L 5/5    Core strength (DLLT):  ~80 deg  (Poor)    HS  "length (90/90):  R -35  L -26    Response to repeated movements: prone lying x2 minutes- centralization of leg symptoms from mid calf on R to R buttock. Repeated prone prone-centralization of leg symptoms from R mid glute to right proximal glute/QL in back.    Gait: increased stance time on the right with increased trunk sway to the right, antalgic gait, decreased fran and step length bilaterally  Dermatomes: intact  Palpation: TTP to the right QL, right piriformis, right gluteus medius, right lateral border of the sacrum. Mild tenderness to palpation to the left piriformis and proximal gluteals  Special tests: + slump on R, - compression, - gapping, - sacral thrust, - thigh thrust    Observation: R anteriorly rotated innominate    denies saddle paraesthesias and changes in bowel/bladder function    Outcome Measures:  Other Measures  Oswestry Disablity Index ([place]): 22; 44%     Treatments:  Therapeutic exercises:  MET to correct R anteriorly rotated innominate  Supine hip abd/add vs blackTB/PB 10x5\"  Prone lying x2'  Prone prop x10  Supine piriformis stretch 2x30\"  (13')    EDUCATION:  Outpatient Education  Individual(s) Educated: Patient  Education Provided: Anatomy, Body Mechanics, Home Exercise Program, Physiology, POC  Risk and Benefits Discussed with Patient/Caregiver/Other: yes  Patient/Caregiver Demonstrated Understanding: yes  Plan of Care Discussed and Agreed Upon: yes  Patient Response to Education: Patient/Caregiver Verbalized Understanding of Information, Patient/Caregiver Performed Return Demonstration of Exercises/Activities, Patient/Caregiver Asked Appropriate Questions  HEP:  Exercises  - Lying Prone  - 2-3 x daily - 7 x weekly - 1 sets - 1 reps - 3-5 min hold  - Static Prone on Elbows  - 2-3 x daily - 7 x weekly - 1 sets - 10 reps - 5 sec hold  - Supine Piriformis Stretch with Foot on Ground  - 2 x daily - 7 x weekly - 1 sets - 3 reps - 30sec hold  - Hooklying Clamshell with Resistance  - 1 x " "daily - 7 x weekly - 1 sets - 15 reps - 5 sec hold  - Supine Hip Adduction Isometric with Ball  - 1 x daily - 7 x weekly - 1 sets - 15 reps - 5 sec hold  - Supine Transversus Abdominis Bracing - Hands on Stomach  - 1 x daily - 7 x weekly - 2 sets - 10 reps - 5 sec hold  Goals:  By discharge:  1. Patient will report and demonstrate independence with established HEP  2. Patient will demonstrate ability to bend and lift 10lb from the floor to waist height 10x consecutively without an increase in low back pain  3. Patient will increase gross hip and knee strength to >/= 4+/5 to improve their ability to stand, ambulate, lift, and perform all work duties without restrictions  4. Patient will report pain of 0/10 at rest, and no greater than 2/10 with any activity including bending, lifting, stairs, and walking to improve his tolerance to daily tasks  5. Patient will demonstrate a decrease in Modified Oswestry Low Back Disability Index score by 10 pts to 12/50 to meet established MCID for the outcome measure (baseline 8/15/24 22/50)  6. Patient will report the ability to sleep through the night 6/7 nights per week uninterrupted by pain to improve overall function throughout the day  7. Patient will demonstrate the ability to ascend/descend one flight of stairs reciprocally and without an increase in pain to improve function within the home and the community  8. Patient will demonstrate \"average\" core strength or better shown by a double leg lower test score of </= 45 deg to provide the lower trunk increased stability throughout daily tasks and functions    "

## 2024-08-15 ENCOUNTER — EVALUATION (OUTPATIENT)
Dept: PHYSICAL THERAPY | Facility: CLINIC | Age: 64
End: 2024-08-15
Payer: MEDICARE

## 2024-08-15 DIAGNOSIS — M54.31 SCIATICA OF RIGHT SIDE: ICD-10-CM

## 2024-08-15 PROCEDURE — 97110 THERAPEUTIC EXERCISES: CPT | Mod: GP | Performed by: PHYSICAL THERAPIST

## 2024-08-15 PROCEDURE — 97161 PT EVAL LOW COMPLEX 20 MIN: CPT | Mod: GP | Performed by: PHYSICAL THERAPIST

## 2024-08-15 ASSESSMENT — PAIN - FUNCTIONAL ASSESSMENT: PAIN_FUNCTIONAL_ASSESSMENT: 0-10

## 2024-08-15 ASSESSMENT — PAIN DESCRIPTION - DESCRIPTORS: DESCRIPTORS: BURNING

## 2024-08-15 ASSESSMENT — PAIN SCALES - GENERAL: PAINLEVEL_OUTOF10: 7

## 2024-08-22 ENCOUNTER — TREATMENT (OUTPATIENT)
Dept: PHYSICAL THERAPY | Facility: CLINIC | Age: 64
End: 2024-08-22
Payer: MEDICARE

## 2024-08-22 DIAGNOSIS — M54.31 SCIATICA OF RIGHT SIDE: Primary | ICD-10-CM

## 2024-08-22 PROCEDURE — 97110 THERAPEUTIC EXERCISES: CPT | Mod: GP | Performed by: PHYSICAL THERAPIST

## 2024-08-22 ASSESSMENT — PAIN - FUNCTIONAL ASSESSMENT: PAIN_FUNCTIONAL_ASSESSMENT: 0-10

## 2024-08-22 ASSESSMENT — PAIN SCALES - GENERAL: PAINLEVEL_OUTOF10: 5 - MODERATE PAIN

## 2024-08-22 NOTE — PROGRESS NOTES
Physical Therapy    Physical Therapy Treatment    Patient Name: Wan Dunlap  MRN: [id number]  Today's Date: 8/22/2024    Time Entry:   Time Calculation  Start Time: 0717  Stop Time: 0800  Time Calculation (min): 43 min     PT Therapeutic Procedures Time Entry  Therapeutic Exercise Time Entry: 40                 Insurance:  [employer]  30% coinsurance; $15 copay  PA req  10 V approved   8/8/24-11/8/24  Visit: 2  POC: 10    Assessment:  HEP reviewed this date, and patient demonstrates a good understanding and tolerance to his current home exercises. Therapeutic exercises performed this date target improving flexibility, strength, and stability of the low back and lower extremities within pain tolerance. Patient does not respond to extension based exercises this date as well as the initial evaluation, and he notes continued symptoms with similar intensity following extension exercises. Pain intensity is a 6/10 at end of session, and PT instructs him to monitor his symptoms over the next few hours. HEP updated for additional progressions outside of PT sessions. He remains below his baseline function at this time, and would benefit from additional skilled PT.   PT Assessment  PT Assessment Results: Decreased strength, Decreased range of motion, Decreased mobility, Pain  Rehab Prognosis: Good  Plan:  OP PT Plan  PT Plan: Skilled PT  Rehab Potential: Good  Plan of Care Agreement: Patient  Continue to progress flexibility, strength, and stability of the low back as tolerated    Current Problem  1. Sciatica of right side  Follow Up In Physical Therapy          General     General  Reason for Referral: Low back and right lower extremity pain  Referred By: Dr. Thomas Samson  Past Medical History Relevant to Rehab: hx of diabetes, HTN, neuropathy, torn achilles tendon    Subjective    Patient reports that the pain is slightly better since his last visit. He notes that pain is a 5/10 at this time, but the pain is now  "more so just going to the knee rather than the calf. Exercises have been going well, without much issue.   Precautions  Precautions  STEADI Fall Risk Score (The score of 4 or more indicates an increased risk of falling): 0  Precautions Comment: hx of diabetes, HTN, neuropathy  Pain  Pain Assessment  Pain Assessment: 0-10  0-10 (Numeric) Pain Score: 5 - Moderate pain  Pain Location: Leg  Pain Orientation: Right    Objective   Level pelvis at start of session    No change in symptoms following prone lying and prone prop    Treatments:  Therapeutic exercises:  Supine hip abd/add vs blackTB/PB 15x5\"  Prone lying x2'  Prone prop on elbows x10  Supine piriformis stretch 3x30\" B  LTR x10 B  Abdominal brace 10x5\"  Supine march with abdominal brace x15  Bent knee fall out with abdominal brace x15  Paloff press GTT x12 B  HS stretch seated 3x30\" on R     OP EDUCATION:     8/15/24:  HEP:  Exercises  - Lying Prone  - 2-3 x daily - 7 x weekly - 1 sets - 1 reps - 3-5 min hold  - Static Prone on Elbows  - 2-3 x daily - 7 x weekly - 1 sets - 10 reps - 5 sec hold  - Supine Piriformis Stretch with Foot on Ground  - 2 x daily - 7 x weekly - 1 sets - 3 reps - 30sec hold  - Hooklying Clamshell with Resistance  - 1 x daily - 7 x weekly - 1 sets - 15 reps - 5 sec hold  - Supine Hip Adduction Isometric with Ball  - 1 x daily - 7 x weekly - 1 sets - 15 reps - 5 sec hold  - Supine Transversus Abdominis Bracing - Hands on Stomach  - 1 x daily - 7 x weekly - 2 sets - 10 reps - 5 sec hold  8/22/24:   Exercises  - Seated Hamstring Stretch  - 2 x daily - 7 x weekly - 1 sets - 3 reps - 30 sec hold  - Supine March  - 1 x daily - 7 x weekly - 2 sets - 10 reps  Goals:  By discharge:  1. Patient will report and demonstrate independence with established HEP  2. Patient will demonstrate ability to bend and lift 10lb from the floor to waist height 10x consecutively without an increase in low back pain  3. Patient will increase gross hip and knee strength " "to >/= 4+/5 to improve their ability to stand, ambulate, lift, and perform all work duties without restrictions  4. Patient will report pain of 0/10 at rest, and no greater than 2/10 with any activity including bending, lifting, stairs, and walking to improve his tolerance to daily tasks  5. Patient will demonstrate a decrease in Modified Oswestry Low Back Disability Index score by 10 pts to 12/50 to meet established MCID for the outcome measure (baseline 8/15/24 22/50)  6. Patient will report the ability to sleep through the night 6/7 nights per week uninterrupted by pain to improve overall function throughout the day  7. Patient will demonstrate the ability to ascend/descend one flight of stairs reciprocally and without an increase in pain to improve function within the home and the community  8. Patient will demonstrate \"average\" core strength or better shown by a double leg lower test score of </= 45 deg to provide the lower trunk increased stability throughout daily tasks and functions  "

## 2024-08-30 ENCOUNTER — APPOINTMENT (OUTPATIENT)
Dept: PHYSICAL THERAPY | Facility: CLINIC | Age: 64
End: 2024-08-30
Payer: MEDICARE

## 2024-09-06 ENCOUNTER — APPOINTMENT (OUTPATIENT)
Dept: PHYSICAL THERAPY | Facility: CLINIC | Age: 64
End: 2024-09-06
Payer: MEDICARE

## 2024-09-09 ENCOUNTER — TREATMENT (OUTPATIENT)
Dept: PHYSICAL THERAPY | Facility: CLINIC | Age: 64
End: 2024-09-09
Payer: MEDICARE

## 2024-09-09 DIAGNOSIS — M54.31 SCIATICA OF RIGHT SIDE: Primary | ICD-10-CM

## 2024-09-09 PROCEDURE — 97110 THERAPEUTIC EXERCISES: CPT | Mod: GP,CQ

## 2024-09-09 PROCEDURE — 97140 MANUAL THERAPY 1/> REGIONS: CPT | Mod: GP,CQ

## 2024-09-09 ASSESSMENT — PAIN - FUNCTIONAL ASSESSMENT: PAIN_FUNCTIONAL_ASSESSMENT: 0-10

## 2024-09-09 ASSESSMENT — PAIN SCALES - GENERAL: PAINLEVEL_OUTOF10: 3

## 2024-09-09 NOTE — PROGRESS NOTES
"Physical Therapy    Physical Therapy Treatment    Patient Name: Wan Dunlap  MRN: [id number]  Today's Date: 9/9/2024    Time Entry:   Time Calculation  Start Time: 0802  Stop Time: 0845  Time Calculation (min): 43 min     PT Therapeutic Procedures Time Entry  Manual Therapy Time Entry: 8  Therapeutic Exercise Time Entry: 35                 Insurance:  [employer]  30% coinsurance; $15 copay  PA req  10 V approved   8/8/24-11/8/24  Visit: 3  POC: 10    Assessment:  Therapeutic exercises performed this date target improving flexibility, strength, and stability of the low back and lower extremities within pain tolerance. Level pelvis and decreased tightness noted in right piriformis following manual therapy. Pt reported decreased pain at end of session stating, \"I definitely feel better than when I came in\". Minimal tenderness only in right buttock but no pain per patient. He also denies symptoms in LLE at end of session.  He remains below his baseline function at this time, and would benefit from additional skilled PT.   PT Assessment  PT Assessment Results: Decreased strength, Decreased range of motion, Decreased mobility, Pain  Plan:  OP PT Plan  PT Plan: Skilled PT  Continue to progress flexibility, strength, and stability of the low back as tolerated    Current Problem  1. Sciatica of right side  Follow Up In Physical Therapy            General     General  Reason for Referral: Low back and right lower extremity pain  Referred By: Dr. Thomas Samson  Past Medical History Relevant to Rehab: hx of diabetes, HTN, neuropathy, torn achilles tendon    Subjective    Pt reports 3/10 pain in right buttock. He denies leg pain currently. He states that prone lying/props seem to be helping to keep symptoms out of his leg. He reports some discomfort in his left LE as well this morning.   Precautions  Precautions  STEADI Fall Risk Score (The score of 4 or more indicates an increased risk of falling): 0  Precautions " "Comment: hx of diabetes, HTN, neuropathy  Pain  Pain Assessment  Pain Assessment: 0-10  0-10 (Numeric) Pain Score: 3  Pain Location: Buttocks  Pain Orientation: Right    Objective     Right anterior innominate rotation corrected with MET     Tightness noted right piriformis    Treatments:  Therapeutic exercises:  Supine hip abd/add vs blackTB/PB 15x5\"  Prone lying x2'  Prone prop on elbows x10  Supine piriformis stretch 3x30\" B  LTR x10 B  Abdominal brace 10x5\"  Supine march with abdominal brace x15  Bent knee fall out with abdominal brace x15  Paloff press GTT x12 B  HS stretch seated 3x30\" on R   (35')  Manual therapy:  STM/TPR to right piriformis in left side lying with pillow between knees   (8')  OP EDUCATION:     8/15/24:  HEP:  Exercises  - Lying Prone  - 2-3 x daily - 7 x weekly - 1 sets - 1 reps - 3-5 min hold  - Static Prone on Elbows  - 2-3 x daily - 7 x weekly - 1 sets - 10 reps - 5 sec hold  - Supine Piriformis Stretch with Foot on Ground  - 2 x daily - 7 x weekly - 1 sets - 3 reps - 30sec hold  - Hooklying Clamshell with Resistance  - 1 x daily - 7 x weekly - 1 sets - 15 reps - 5 sec hold  - Supine Hip Adduction Isometric with Ball  - 1 x daily - 7 x weekly - 1 sets - 15 reps - 5 sec hold  - Supine Transversus Abdominis Bracing - Hands on Stomach  - 1 x daily - 7 x weekly - 2 sets - 10 reps - 5 sec hold  8/22/24:   Exercises  - Seated Hamstring Stretch  - 2 x daily - 7 x weekly - 1 sets - 3 reps - 30 sec hold  - Supine March  - 1 x daily - 7 x weekly - 2 sets - 10 reps  Goals:  By discharge:  1. Patient will report and demonstrate independence with established HEP  2. Patient will demonstrate ability to bend and lift 10lb from the floor to waist height 10x consecutively without an increase in low back pain  3. Patient will increase gross hip and knee strength to >/= 4+/5 to improve their ability to stand, ambulate, lift, and perform all work duties without restrictions  4. Patient will report pain of " "0/10 at rest, and no greater than 2/10 with any activity including bending, lifting, stairs, and walking to improve his tolerance to daily tasks  5. Patient will demonstrate a decrease in Modified Oswestry Low Back Disability Index score by 10 pts to 12/50 to meet established MCID for the outcome measure (baseline 8/15/24 22/50)  6. Patient will report the ability to sleep through the night 6/7 nights per week uninterrupted by pain to improve overall function throughout the day  7. Patient will demonstrate the ability to ascend/descend one flight of stairs reciprocally and without an increase in pain to improve function within the home and the community  8. Patient will demonstrate \"average\" core strength or better shown by a double leg lower test score of </= 45 deg to provide the lower trunk increased stability throughout daily tasks and functions  " attempted 15 years ago    Hyperlipidemia     Hypertension     MVP (mitral valve prolapse)     [facility]arcotic abuse ([facility] [address]     Neuromuscular disorder ([facility] [address]     Pacemaker     medtronic dr Toro Guadarrama cardiologist    Poor intravenous access     Psychiatric problem     SSS (sick sinus syndrome) ([facility] [address]     Tobacco abuse     Wears dentures     upper    Wears glasses     reading    Wrist pain, right     pt states in er fell hardware through skin 12/21/15        Past Surgical History:     Past Surgical History:   Procedure Laterality Date    ARM SURGERY Right 12/23/2015    hardware removal    CARDIAC CATHETERIZATION  2002, 2008    LMCA NML,LAD 20% PROX AND  MID STENOSIS, D1 60% PROX STENOSIS OF THE SMALL CALIBER, LCX PATENT, RCA  20% MID STENOSIS AND 30% PROX STENOSIS LVEF NORMAL    CORONARY ANGIOPLASTY WITH STENT PLACEMENT  2002    7 stents total    FRACTURE SURGERY      HAND SURGERY  2010    five pins and plate right hand    KNEE CARTILAGE SURGERY      left knee    LITHOTRIPSY      x 5    MUSCLE REPAIR  1988    left arm    NERVE BLOCK  01-17-14    duramorph 2 mg, decadron 7.5mg    PACEMAKER INSERTION      palced in 1985, 6 pacemakers since   [facility]  2016    [employer]pta [id number] Implanted 11-: NOT MRI COMPATIBLE    NJ EXC SKIN BENIG <5MM FACE,FACIAL Left 4/11/2018    EXCISION LEFT CHEEK ABSCESS performed by Yusef Carroll MD at [address] [place]      pt states as a child    TYMPANOPLASTY  2011    reconstruction    TYMPANOSTOMY TUBE PLACEMENT      bilateral    WRIST FRACTURE SURGERY Right 11/18/2015    external fixator right wrist         Medications Prior to Admission:     Prior to Admission medications    Medication Sig Start Date End Date Taking? Authorizing Provider   oxyCODONE-acetaminophen (PERCOCET) 5-325 MG per tablet Take 1 tablet by mouth every 4 hours as needed for Pain (wrist surgery in brace at [facility]).  Indications: Pain From Operation 12   Wt 200 lb (90.7 kg)   SpO2 97%   BMI 28.70 kg/m²   Temp (24hrs), Av.7 °F (37.1 °C), Min:98.7 °F (37.1 °C), Max:98.7 °F (37.1 °C)    Recent Labs     19  1412   POCGLU 104*     No intake or output data in the 24 hours ending 08/15/19 0948    Physical Exam   Constitutional: He is oriented to person, place, and time. He appears well-developed and well-nourished. No distress. HENT:   Head: Normocephalic and atraumatic. Eyes: Pupils are equal, round, and reactive to light. Right eye exhibits no discharge. Left eye exhibits no discharge. Neck: Normal range of motion. Musculoskeletal: Normal range of motion. Neurological: He is alert and oriented to person, place, and time. A sensory deficit is present. GCS eye subscore is 4. GCS verbal subscore is 5. GCS motor subscore is 6. Skin: Skin is warm and dry. Capillary refill takes less than 2 seconds. He is not diaphoretic.        Investigations:      Laboratory Testing:  Recent Results (from the past 24 hour(s))   EKG 12 Lead    Collection Time: 19  1:59 PM   Result Value Ref Range    Ventricular Rate 60 BPM    Atrial Rate 60 BPM    P-R Interval 198 ms    QRS Duration 92 ms    Q-T Interval 442 ms    QTc Calculation (Bazett) 442 ms    P Axis 40 degrees    R Axis 25 degrees    T Axis 44 degrees   CBC    Collection Time: 19  2:09 PM   Result Value Ref Range    WBC 7.8 3.5 - 11.3 k/uL    RBC 5.14 4.21 - 5.77 m/uL    Hemoglobin 14.9 13.0 - 17.0 g/dL    Hematocrit 46.0 40.7 - 50.3 %    MCV 89.5 82.6 - 102.9 fL    MCH 29.0 25.2 - 33.5 pg    MCHC 32.4 28.4 - 34.8 g/dL    RDW 13.1 11.8 - 14.4 %    Platelets 550 603 - 514 k/uL    MPV 9.2 8.1 - 13.5 fL    NRBC Automated 0.0 0.0 per 100 WBC   Basic Metabolic Panel    Collection Time: 19  2:09 PM   Result Value Ref Range    Glucose 104 (H) 70 - 99 mg/dL    BUN 21 (H) 6 - 20 mg/dL    CREATININE 0.74 0.70 - 1.20 mg/dL    Bun/Cre Ratio NOT REPORTED 9 - 20    Calcium 9.2 8.6 - 10.4 mg/dL    Sodium 140 135 - 144 mmol/L    Potassium 4.0 3.7 - 5.3 mmol/L    Chloride 105 98 - 107 mmol/L    CO2 22 20 - 31 mmol/L    Anion Gap 13 9 - 17 mmol/L    GFR Non-African American >60 >60 mL/min    GFR African American >60 >60 mL/min    GFR Comment          GFR Staging NOT REPORTED    Troponin    Collection Time: 08/14/19  2:09 PM   Result Value Ref Range    Troponin, High Sensitivity 7 0 - 22 ng/L    Troponin T NOT REPORTED <0.03 ng/mL    Troponin Interp NOT REPORTED    Differential    Collection Time: 08/14/19  2:09 PM   Result Value Ref Range    Differential Type NOT REPORTED     Seg Neutrophils 60 36 - 65 %    Lymphocytes 29 24 - 43 %    Monocytes 8 3 - 12 %    Eosinophils % 2 1 - 4 %    Basophils 1 0 - 2 %    Immature Granulocytes 0 0 %    Segs Absolute 4.84 1.50 - 8.10 k/uL    Absolute Lymph # 2.36 1.10 - 3.70 k/uL    Absolute Mono # 0.61 0.10 - 1.20 k/uL    Absolute Eos # 0.18 0.00 - 0.44 k/uL    Basophils # 0.06 0.00 - 0.20 k/uL    Absolute Immature Granulocyte 0.03 0.00 - 0.30 k/uL    WBC Morphology NOT REPORTED     RBC Morphology NOT REPORTED     Platelet Estimate NOT REPORTED    Protime-INR    Collection Time: 08/14/19  2:09 PM   Result Value Ref Range    Protime 9.6 9.0 - 12.0 sec    INR 0.9    APTT    Collection Time: 08/14/19  2:09 PM   Result Value Ref Range    PTT 24.8 20.5 - 30.5 sec   SPECIMEN REJECTION    Collection Time: 08/14/19  2:09 PM   Result Value Ref Range    Specimen Source . BLOOD     Ordered Test [facility]     Reason for Rejection       Unable to perform testing: Specimen quantity not sufficient.    - NOT REPORTED    Venous Blood Gas, POC    Collection Time: 08/14/19  2:12 PM   Result Value Ref Range    pH, Johnson 7.381 7.320 - 7.430    pCO2, Johnson 41.4 41.0 - 51.0 mm Hg    pO2, Johnson 47.7 30.0 - 50.0 mm Hg    HCO3, Venous 24.6 22.0 - 29.0 mmol/L    Total CO2, Venous 26 23.0 - 30.0 mmol/L    Negative Base Excess, Johnson 1 0.0 - 2.0    Positive Base Excess, Johnson NOT REPORTED 0.0 - 3.0    O2 REPORTED NEGATIVE    Cannabinoid Scrn, Ur NEGATIVE NEGATIVE    Oxycodone Screen, Ur NEGATIVE NEGATIVE    Methamphetamine, Urine NOT REPORTED NEGATIVE    Tricyclic Antidepressants, Urine NOT REPORTED NEGATIVE    MDMA, Urine NOT REPORTED NEGATIVE    Buprenorphine Urine NOT REPORTED NEGATIVE    Test Information       Assay provides medical screening only. The absence of expected drug(s) and/or metabolite(s) may indicate diluted or adulterated urine, limitations of testing or timing of collection. Acetaminophen Level    Collection Time: 08/14/19  5:44 PM   Result Value Ref Range    Acetaminophen Level <5 (L) 10 - 30 ug/mL   Ethanol    Collection Time: 08/14/19  5:44 PM   Result Value Ref Range    Ethanol <10 <10 mg/dL    Ethanol percent <0.010 <0.010 %   CK isoenzymes    Collection Time: 08/14/19  5:44 PM   Result Value Ref Range    Total CK 48 39 - 308 U/L    CK-MB <1.0 <10.5 ng/mL    % CKMB 2.1 0.0 - 3.5 %    CKMB Interpretation NORMAL ISOENZYME PATTERN    Myoglobin, Serum    Collection Time: 08/14/19  5:44 PM   Result Value Ref Range    Myoglobin <21 (L) 28 - 72 ng/mL   Salicylate    Collection Time: 08/14/19  5:44 PM   Result Value Ref Range    Salicylate Lvl <1 (L) 3 - 10 mg/dL   TOXIC TRICYCLIC SC,B    Collection Time: 08/14/19  5:44 PM   Result Value Ref Range    Toxic Tricyclic Sc,Blood NEGATIVE NEGATIVE   STROKE PANEL    Collection Time: 08/14/19  8:48 PM   Result Value Ref Range    WBC 12.1 (H) 3.5 - 11.3 k/uL    RBC 5.32 4.21 - 5.77 m/uL    Hemoglobin 15.2 13.0 - 17.0 g/dL    Hematocrit 47.9 40.7 - 50.3 %    MCV 90.0 82.6 - 102.9 fL    MCH 28.6 25.2 - 33.5 pg    MCHC 31.7 28.4 - 34.8 g/dL    RDW 13.0 11.8 - 14.4 %    Platelets 674 428 - 203 k/uL    MPV 9.3 8.1 - 13.5 fL    NRBC Automated 0.0 0.0 per 100 WBC    Differential Type Order moved to nearest draw time.      Seg Neutrophils Order moved to nearest draw time.  %    Lymphocytes Order moved to nearest draw time.   %    Monocytes Order moved to nearest draw time.  %    Eosinophils % Order moved to nearest draw time.  %    Basophils Order moved to nearest draw time.  %    Immature Granulocytes Order moved to nearest draw time.  0 %    Segs Absolute Order moved to nearest draw time.  k/uL    Absolute Lymph # Order moved to nearest draw time.  k/uL    Absolute Mono # Order moved to nearest draw time.  k/uL    Absolute Eos # Order moved to nearest draw time.  k/uL    Basophils # Order moved to nearest draw time.  0.0 - 0.2 k/uL    Absolute Immature Granulocyte Order moved to nearest draw time.  0.00 - 0.30 k/uL    WBC Morphology Order moved to nearest draw time.      RBC Morphology Order moved to nearest draw time.      Platelet Estimate Order moved to nearest draw time.      Protime Order moved to nearest draw time.  sec    INR Order moved to nearest draw time.      PTT Order moved to nearest draw time.  sec    Myoglobin Order moved to nearest draw time.  ng/mL    Troponin, High Sensitivity DUPLICATE ORDER 0 - 22 ng/L    Troponin T DUPLICATE ORDER <2.45 ng/mL    Troponin Interp DUPLICATE ORDER     Glucose DUPLICATE ORDER mg/dL    BUN DUPLICATE ORDER mg/dL    CREATININE DUPLICATE ORDER mg/dL    Bun/Cre Ratio DUPLICATE ORDER 9 - 20    Calcium DUPLICATE ORDER mg/dL    Sodium DUPLICATE ORDER mmol/L    Potassium DUPLICATE ORDER mmol/L    Chloride DUPLICATE ORDER mmol/L    CO2 DUPLICATE ORDER mmol/L    Anion Gap DUPLICATE ORDER mmol/L    GFR Non- DUPLICATE ORDER >29 mL/min    GFR  DUPLICATE ORDER >70 mL/min    GFR Comment DUPLICATE ORDER     GFR Staging DUPLICATE ORDER     Total CK Order moved to nearest draw time.  U/L    CK-MB Order moved to nearest draw time.  ng/mL    % CKMB Order moved to nearest draw time.  %    CKMB Interpretation Order moved to nearest draw time.          Imaging/Diagnostics:  Ct Head Wo

## 2024-09-11 ENCOUNTER — APPOINTMENT (OUTPATIENT)
Dept: PHYSICAL THERAPY | Facility: CLINIC | Age: 64
End: 2024-09-11
Payer: MEDICARE

## 2024-09-13 ENCOUNTER — TREATMENT (OUTPATIENT)
Dept: PHYSICAL THERAPY | Facility: CLINIC | Age: 64
End: 2024-09-13
Payer: MEDICARE

## 2024-09-13 DIAGNOSIS — M54.31 SCIATICA OF RIGHT SIDE: ICD-10-CM

## 2024-09-13 PROCEDURE — 97110 THERAPEUTIC EXERCISES: CPT | Mod: GP | Performed by: PHYSICAL THERAPIST

## 2024-09-13 PROCEDURE — 97140 MANUAL THERAPY 1/> REGIONS: CPT | Mod: GP | Performed by: PHYSICAL THERAPIST

## 2024-09-13 ASSESSMENT — PAIN - FUNCTIONAL ASSESSMENT: PAIN_FUNCTIONAL_ASSESSMENT: 0-10

## 2024-09-13 ASSESSMENT — PAIN SCALES - GENERAL: PAINLEVEL_OUTOF10: 3

## 2024-09-13 NOTE — PROGRESS NOTES
PHYSICAL THERAPY TREATMENT    Patient name:  Wan Dunlap    MRN:  [id number]    :  1960    Today's Date:  24    Time Calculation  Start Time: 830  Stop Time: 911  Time Calculation (min): 41 min     PT Therapeutic Procedures Time Entry  Manual Therapy Time Entry: 8  Therapeutic Exercise Time Entry: 30       Referral by:  Referred By: Dr. Thomas Samson    Diagnoses:  Diagnosis and Precautions: Low back and right lower extremity pain    Assessment/Plan:  Therapeutic exercise performed in order to improve R hip strength/ROM/mobility.  Patient requires increased tactile/verbal cues with exercise in order to reduce R hip stress/strain during the particular exercise performed.  Patient challenged with exercises performed in clinic secondary to R hip stiffness/ fatigue.   PT Assessment  PT Assessment Results: Decreased strength, Decreased range of motion, Decreased mobility, Pain  Rehab Prognosis: Good  OP PT Plan  PT Plan: Skilled PT  Rehab Potential: Good  Plan of Care Agreement: Patient    General Visit Information  PT  Visit  PT Received On: 24    General  Reason for Referral: Low back and right lower extremity pain  Referred By: Dr. Thomas Samson  Past Medical History Relevant to Rehab: hx of diabetes, HTN, neuropathy, torn achilles tendon  General Comment: Low back and right lower extremity pain    Insurance Reviewed  Name of insurance:  [facility]  Visit #:   4  SC[employer] RIGHT SIDE M54.31; 30% COINSUR / $15 COPAY / 1000 SINGLE DED IS MET / 2000 FAM DED 1000 REMAINS / 7500/15,000 OOP (6871.62 REMAINS)/PRIOR AUTH PHYSICAL THERAPY 10V 24 - 24 AUTH#: [id number] // Hsivani confirmed 24 8:37am     Subjective:  Patient reports that overall his R buttock pain is feeling better.    Precautions  STEADI Fall Risk Score (The score of 4 or more indicates an increased risk of falling): 0  Precautions Comment: hx of diabetes, HTN, neuropathy    Pain:  Pain Assessment  Pain Assessment:  "0-10  0-10 (Numeric) Pain Score: 3  Pain Location: Buttocks  Pain Orientation: Right    Treatments    Therapeutic Exercise  Therapeutic Exercise Performed: Yes  Therapeutic Exercise Activity 1: prone x 3 minutes  Therapeutic Exercise Activity 2: prone on elbows x 3 minutes  Therapeutic Exercise Activity 3: supine piriformis stretch 30\"x5 on R  Therapeutic Exercise Activity 4: supine R hamstring stretch 30\"x5  Therapeutic Exercise Activity 5: supine bridges x 20 reps  Therapeutic Exercise Activity 6: supine bent knee fallouts x 20 reps  Therapeutic Exercise Activity 7: supine marching x 20 reps  Therapeutic Exercise Activity 8: supine heel walks x 20 reps    Manual Therapy  Manual Therapy Performed: Yes  Manual Therapy Activity 1: Trigger point release R piriformis with R elbow    Treatment  Time in clinic started at:   8:30am  Time in clinic ended at:   9:11am  Total time in clinic is:   41 minutes  Total timed code time is:  38 minutes    Treatment Performed Today:.   Therapeutic Exercise x  2 units  Manual therapy x 1 unit  "

## 2024-09-16 ENCOUNTER — TREATMENT (OUTPATIENT)
Dept: PHYSICAL THERAPY | Facility: CLINIC | Age: 64
End: 2024-09-16
Payer: MEDICARE

## 2024-09-16 DIAGNOSIS — M54.31 SCIATICA OF RIGHT SIDE: ICD-10-CM

## 2024-09-16 PROCEDURE — 97110 THERAPEUTIC EXERCISES: CPT | Mod: GP | Performed by: PHYSICAL THERAPIST

## 2024-09-16 PROCEDURE — 97140 MANUAL THERAPY 1/> REGIONS: CPT | Mod: GP | Performed by: PHYSICAL THERAPIST

## 2024-09-16 ASSESSMENT — PAIN SCALES - GENERAL: PAINLEVEL_OUTOF10: 1

## 2024-09-16 ASSESSMENT — PAIN - FUNCTIONAL ASSESSMENT: PAIN_FUNCTIONAL_ASSESSMENT: 0-10

## 2024-09-16 NOTE — PROGRESS NOTES
Physical Therapy    Physical Therapy Treatment    Patient Name: Wan Dunlap  MRN: [id number]  Today's Date: 9/16/2024    Time Entry:   Time Calculation  Start Time: 0803  Stop Time: 0845  Time Calculation (min): 42 min     PT Therapeutic Procedures Time Entry  Manual Therapy Time Entry: 9  Therapeutic Exercise Time Entry: 31                 Insurance:  [employer]  30% coinsurance; $15 copay  PA req  10 V approved   8/8/24-11/8/24  Visit: 5  POC: 10    Assessment:  Patient tolerates treatment well this date, with only mild increase in muscular soreness noted at the end of session as expected for given activities. Therapeutic exercises focused on improving flexibility and strength of the core, low back, and lower extremities within pain tolerance. Manual therapy to the right posterior and lateral hip tolerated well, but increased tenderness is noted throughout the area. HEP updated for additional progression outside of PT sessions. PT will discuss possible decrease in frequency next visit provided continued improvement following today's appointment. He remains below his baseline function at this time, and would benefit from additional skilled PT.   PT Assessment  PT Assessment Results: Decreased strength, Decreased range of motion, Decreased mobility, Pain  Rehab Prognosis: Good  Plan:  OP PT Plan  PT Plan: Skilled PT  Rehab Potential: Good  Plan of Care Agreement: Patient  Continue to progress flexibility, strength, and stability of the low back as tolerated    Current Problem  1. Sciatica of right side  Follow Up In Physical Therapy              General     General  Reason for Referral: Low back and right lower extremity pain  Referred By: Dr. Thomas Samson  Past Medical History Relevant to Rehab: hx of diabetes, HTN, neuropathy, torn achilles tendon  General Comment: Low back and right lower extremity pain    Subjective    Patient reports that the back is feeling better, and denies pain in the back at  "this time. He does still get pain in the right leg that is slowly resolving, and is mostly in the buttock. Occasional pain shooting down the leg but rare.   Precautions  Precautions  STEADI Fall Risk Score (The score of 4 or more indicates an increased risk of falling): 0  Precautions Comment: hx of diabetes, HTN, neuropathy  Pain  Pain Assessment  Pain Assessment: 0-10  0-10 (Numeric) Pain Score: 1  Pain Location: Buttocks  Pain Orientation: Right    Objective     Level pelvis at start of session    Treatments:  Therapeutic exercises:  Supine hip abd/add vs blackTB/PB 15x5\"  Prone lying x2' NT  Prone prop on elbows x10 NT  Supine piriformis stretch 3x30\" B  LTR x10 B  Abdominal brace 10x5\"  Supine march with abdominal brace x15 NT  PPT with SLR x15 B  Clamshells RTB x15 B  Bent knee fall out with abdominal brace x15 NT  Paloff press blckTT x15 B  HS stretch seated 3x30\" on R NT  (31')    Manual therapy:  STM/TPR to right piriformis in left side lying with pillow between knees   (9')  OP EDUCATION:     8/15/24:  HEP:  Exercises  - Lying Prone  - 2-3 x daily - 7 x weekly - 1 sets - 1 reps - 3-5 min hold  - Static Prone on Elbows  - 2-3 x daily - 7 x weekly - 1 sets - 10 reps - 5 sec hold  - Supine Piriformis Stretch with Foot on Ground  - 2 x daily - 7 x weekly - 1 sets - 3 reps - 30sec hold  - Hooklying Clamshell with Resistance  - 1 x daily - 7 x weekly - 1 sets - 15 reps - 5 sec hold  - Supine Hip Adduction Isometric with Ball  - 1 x daily - 7 x weekly - 1 sets - 15 reps - 5 sec hold  - Supine Transversus Abdominis Bracing - Hands on Stomach  - 1 x daily - 7 x weekly - 2 sets - 10 reps - 5 sec hold  8/22/24:   Exercises  - Seated Hamstring Stretch  - 2 x daily - 7 x weekly - 1 sets - 3 reps - 30 sec hold  - Supine March  - 1 x daily - 7 x weekly - 2 sets - 10 reps  9/16/24:  Exercises  - Standing Anti-Rotation Press with Anchored Resistance  - 1 x daily - 7 x weekly - 2 sets - 10 reps  Goals:  By discharge:  1. " "Patient will report and demonstrate independence with established HEP  2. Patient will demonstrate ability to bend and lift 10lb from the floor to waist height 10x consecutively without an increase in low back pain  3. Patient will increase gross hip and knee strength to >/= 4+/5 to improve their ability to stand, ambulate, lift, and perform all work duties without restrictions  4. Patient will report pain of 0/10 at rest, and no greater than 2/10 with any activity including bending, lifting, stairs, and walking to improve his tolerance to daily tasks  5. Patient will demonstrate a decrease in Modified Oswestry Low Back Disability Index score by 10 pts to 12/50 to meet established MCID for the outcome measure (baseline 8/15/24 22/50)  6. Patient will report the ability to sleep through the night 6/7 nights per week uninterrupted by pain to improve overall function throughout the day  7. Patient will demonstrate the ability to ascend/descend one flight of stairs reciprocally and without an increase in pain to improve function within the home and the community  8. Patient will demonstrate \"average\" core strength or better shown by a double leg lower test score of </= 45 deg to provide the lower trunk increased stability throughout daily tasks and functions  "

## 2024-09-18 ENCOUNTER — TREATMENT (OUTPATIENT)
Dept: PHYSICAL THERAPY | Facility: CLINIC | Age: 64
End: 2024-09-18
Payer: MEDICARE

## 2024-09-18 DIAGNOSIS — M54.31 SCIATICA OF RIGHT SIDE: Primary | ICD-10-CM

## 2024-09-18 PROCEDURE — 97110 THERAPEUTIC EXERCISES: CPT | Mod: GP,CQ

## 2024-09-18 ASSESSMENT — PAIN SCALES - GENERAL: PAINLEVEL_OUTOF10: 2

## 2024-09-18 ASSESSMENT — PAIN - FUNCTIONAL ASSESSMENT: PAIN_FUNCTIONAL_ASSESSMENT: 0-10

## 2024-09-18 NOTE — PROGRESS NOTES
Physical Therapy    Physical Therapy Treatment    Patient Name: Wan Dunlap  MRN: [id number]  Today's Date: 9/18/2024    Time Entry:   Time Calculation  Start Time: 0804  Stop Time: 0842  Time Calculation (min): 38 min     PT Therapeutic Procedures Time Entry  Therapeutic Exercise Time Entry: 38                 Insurance:  [employer]  30% coinsurance; $15 copay  PA req  10 V approved   8/8/24-11/8/24  Visit: 6  POC: 10    Assessment:   Therapeutic exercises focused on improving flexibility and strength of the core, low back, and lower extremities within pain tolerance. Pt had no complaints of increased pain following exercises. He reported 0/10 soreness in right buttock at end of session. Pt would benefit from PT to continue to address impairments in order to improve strength, flexibility, posture, and body mechanics and to decrease symptoms and increase overall function.   PT Assessment  PT Assessment Results: Decreased strength, Decreased range of motion, Decreased mobility, Pain  Plan:  OP PT Plan  PT Plan: Skilled PT  Continue to progress flexibility, strength, and stability of the low back as tolerated    Current Problem  1. Sciatica of right side  Follow Up In Physical Therapy                General     General  Reason for Referral: Low back and right lower extremity pain  Referred By: Dr. Thomas Samson  Past Medical History Relevant to Rehab: hx of diabetes, HTN, neuropathy, torn achilles tendon  General Comment: Low back and right lower extremity pain    Subjective    Pt states that he is doing well and he has had no pain in his right leg for about a week. He also states that his back has been feeling fine. Minimal soreness only in right buttock currently.    Precautions  Precautions  STEADI Fall Risk Score (The score of 4 or more indicates an increased risk of falling): 0  Precautions Comment: hx of diabetes, HTN, neuropathy  Pain  Pain Assessment  Pain Assessment: 0-10  0-10 (Numeric) Pain  "Score: 2  Pain Location: Buttocks  Pain Orientation: Right    Objective     Level pelvis upon entrance     Educated patient in use of tennis ball for self trigger point release of right piriformis    Pt able to ascend/descend flight of stairs reciprocally in clinic without any pain today    Treatments:  Therapeutic exercises:  Supine hip abd/add vs blackTB/PB 15x5\"  Prone lying x2' NT  Prone prop on elbows x10 NT  Supine piriformis stretch 3x30\" B  LTR x10 B  Abdominal brace 10x5\"  Supine march with abdominal brace x15   PPT with SLR x15 B  Clamshells RTB x15 B  Bent knee fall out with abdominal brace x15   Paloff press blckTT x15 B  HS stretch seated 3x30\" on R NT  Includes STM/TPR to right piriformis in left side lying with pillow between knees   (38')  OP EDUCATION:     8/15/24:  HEP:  Exercises  - Lying Prone  - 2-3 x daily - 7 x weekly - 1 sets - 1 reps - 3-5 min hold  - Static Prone on Elbows  - 2-3 x daily - 7 x weekly - 1 sets - 10 reps - 5 sec hold  - Supine Piriformis Stretch with Foot on Ground  - 2 x daily - 7 x weekly - 1 sets - 3 reps - 30sec hold  - Hooklying Clamshell with Resistance  - 1 x daily - 7 x weekly - 1 sets - 15 reps - 5 sec hold  - Supine Hip Adduction Isometric with Ball  - 1 x daily - 7 x weekly - 1 sets - 15 reps - 5 sec hold  - Supine Transversus Abdominis Bracing - Hands on Stomach  - 1 x daily - 7 x weekly - 2 sets - 10 reps - 5 sec hold  8/22/24:   Exercises  - Seated Hamstring Stretch  - 2 x daily - 7 x weekly - 1 sets - 3 reps - 30 sec hold  - Supine March  - 1 x daily - 7 x weekly - 2 sets - 10 reps  9/16/24:  Exercises  - Standing Anti-Rotation Press with Anchored Resistance  - 1 x daily - 7 x weekly - 2 sets - 10 reps  Goals:  By discharge:  1. Patient will report and demonstrate independence with established HEP  2. Patient will demonstrate ability to bend and lift 10lb from the floor to waist height 10x consecutively without an increase in low back pain  3. Patient will " "increase gross hip and knee strength to >/= 4+/5 to improve their ability to stand, ambulate, lift, and perform all work duties without restrictions  4. Patient will report pain of 0/10 at rest, and no greater than 2/10 with any activity including bending, lifting, stairs, and walking to improve his tolerance to daily tasks  5. Patient will demonstrate a decrease in Modified Oswestry Low Back Disability Index score by 10 pts to 12/50 to meet established MCID for the outcome measure (baseline 8/15/24 22/50)  6. Patient will report the ability to sleep through the night 6/7 nights per week uninterrupted by pain to improve overall function throughout the day  7. Patient will demonstrate the ability to ascend/descend one flight of stairs reciprocally and without an increase in pain to improve function within the home and the community  8. Patient will demonstrate \"average\" core strength or better shown by a double leg lower test score of </= 45 deg to provide the lower trunk increased stability throughout daily tasks and functions  "

## 2024-09-23 ENCOUNTER — APPOINTMENT (OUTPATIENT)
Dept: PHYSICAL THERAPY | Facility: CLINIC | Age: 64
End: 2024-09-23
Payer: MEDICARE

## 2024-09-26 ENCOUNTER — TREATMENT (OUTPATIENT)
Dept: PHYSICAL THERAPY | Facility: CLINIC | Age: 64
End: 2024-09-26
Payer: MEDICARE

## 2024-09-26 DIAGNOSIS — M54.31 SCIATICA OF RIGHT SIDE: Primary | ICD-10-CM

## 2024-09-26 PROCEDURE — 97110 THERAPEUTIC EXERCISES: CPT | Mod: GP | Performed by: PHYSICAL THERAPIST

## 2024-09-26 ASSESSMENT — PAIN SCALES - GENERAL: PAINLEVEL_OUTOF10: 2

## 2024-09-26 ASSESSMENT — PAIN - FUNCTIONAL ASSESSMENT: PAIN_FUNCTIONAL_ASSESSMENT: 0-10

## 2024-09-26 NOTE — PROGRESS NOTES
Physical Therapy    Physical Therapy Treatment    Patient Name: Wan Dunlap  MRN: [id number]  Today's Date: 9/26/2024    Time Entry:   Time Calculation  Start Time: 0800  Stop Time: 0842  Time Calculation (min): 42 min     PT Therapeutic Procedures Time Entry  Therapeutic Exercise Time Entry: 42                 Insurance:  [employer]  30% coinsurance; $15 copay  PA req  10 V approved   8/8/24-11/8/24  Visit: 7  POC: 10    Assessment:  Therapeutic exercises performed this date target improving flexibility, strength, and stability of the low back, lower extremities, and core. Patient tolerates all treatment well this date without reports of increased pain. Examination reveals patient has met 5/8 of his established therapy goals. Patient expresses desire to place his current PT case on hold while he continues with his home exercises. PT will be placed on hold for a maximum of 30 days while he continues with HEP. Patient instructed to contact this office within that time period if additional skilled PT is required. If no additional skilled PT is required, objective measures obtained today will act as discharge measurements.   PT Assessment  PT Assessment Results: Decreased strength, Decreased range of motion, Decreased mobility, Pain  Plan:  OP PT Plan  PT Plan: Skilled PT  Continue to progress flexibility, strength, and stability of the low back as tolerated    Current Problem  1. Sciatica of right side  Follow Up In Physical Therapy                  General     General  Reason for Referral: Low back and right lower extremity pain  Referred By: Dr. Thomas Samson  Past Medical History Relevant to Rehab: hx of diabetes, HTN, neuropathy, torn achilles tendon  General Comment: Low back and right lower extremity pain    Subjective    Patient reports that his pain has been getting progressively better. He still has times when the pain fluctuates between a 1-3/10 depending on the day and the activity. He believes  "that he is doing good overall, and is considering putting his PT on hold while he continues with his HEP. Pain is no longer waking him up.  Precautions  Precautions  STEADI Fall Risk Score (The score of 4 or more indicates an increased risk of falling): 0  Precautions Comment: hx of diabetes, HTN, neuropathy  Pain  Pain Assessment  Pain Assessment: 0-10  0-10 (Numeric) Pain Score: 2  Pain Location: Buttocks  Pain Orientation: Right    Objective     LE Strength:  Hip flexion R 4+/5, L 4+/5  Hip abd R 4+/5, L 4+/5  Hip ext R 4/5, L 4/5  Knee flexion R 4+/5, L 5/5  Knee extension R 4+/5, L 5/5    Core strength (DLLT):  ~45 deg  (average)    Lifting: pt is able to lift 10lb from the floor 10x with pain 0/10    STERLING: 7;14%    Pt able to ascend/descend flight of stairs reciprocally in clinic without any pain today (assessed 9/18/24)        Treatments:  Therapeutic exercises:  Obj measures and goals review  Supine hip abd/add vs blackTB/PB 15x5\"  Prone lying x2' NT  Prone prop on elbows x10 NT  Supine piriformis stretch 2x30\" R  Supine figure 4 stretch 2x30\" R  LTR x10 B  Abdominal brace 10x5\"  Supine march with abdominal brace x15 NT  PPT with SLR x15 B  Clamshells GTB x15 B  Bent knee fall out with abdominal brace x15 NT  Paloff press blckTT x15 B  Row with abdominal brace BlckTT x15  Alt Pulldowns blckTT x15 B  HS stretch seated 3x30\" on R  Prone hip extension x15 B  (42')  OP EDUCATION:     8/15/24:  HEP:  Exercises  - Lying Prone  - 2-3 x daily - 7 x weekly - 1 sets - 1 reps - 3-5 min hold  - Static Prone on Elbows  - 2-3 x daily - 7 x weekly - 1 sets - 10 reps - 5 sec hold  - Supine Piriformis Stretch with Foot on Ground  - 2 x daily - 7 x weekly - 1 sets - 3 reps - 30sec hold  - Hooklying Clamshell with Resistance  - 1 x daily - 7 x weekly - 1 sets - 15 reps - 5 sec hold  - Supine Hip Adduction Isometric with Ball  - 1 x daily - 7 x weekly - 1 sets - 15 reps - 5 sec hold  - Supine Transversus Abdominis Bracing - " "Hands on Stomach  - 1 x daily - 7 x weekly - 2 sets - 10 reps - 5 sec hold  8/22/24:   Exercises  - Seated Hamstring Stretch  - 2 x daily - 7 x weekly - 1 sets - 3 reps - 30 sec hold  - Supine March  - 1 x daily - 7 x weekly - 2 sets - 10 reps  9/16/24:  Exercises  - Standing Anti-Rotation Press with Anchored Resistance  - 1 x daily - 7 x weekly - 2 sets - 10 reps  9/26/24:  Exercises  - Prone Hip Extension  - 1 x daily - 7 x weekly - 2 sets - 10 reps  Goals:  By discharge:  1. Patient will report and demonstrate independence with established HEP ONGOING  2. Patient will demonstrate ability to bend and lift 10lb from the floor to waist height 10x consecutively without an increase in low back pain  MET  3. Patient will increase gross hip and knee strength to >/= 4+/5 to improve their ability to stand, ambulate, lift, and perform all work duties without restrictions PROGRESSING  4. Patient will report pain of 0/10 at rest, and no greater than 2/10 with any activity including bending, lifting, stairs, and walking to improve his tolerance to daily tasks PROGRESSING  5. Patient will demonstrate a decrease in Modified Oswestry Low Back Disability Index score by 10 pts to 12/50 to meet established MCID for the outcome measure (baseline 8/15/24 22/50) MET  6. Patient will report the ability to sleep through the night 6/7 nights per week uninterrupted by pain to improve overall function throughout the day MET  7. Patient will demonstrate the ability to ascend/descend one flight of stairs reciprocally and without an increase in pain to improve function within the home and the community MET  8. Patient will demonstrate \"average\" core strength or better shown by a double leg lower test score of </= 45 deg to provide the lower trunk increased stability throughout daily tasks and functions MET  "

## 2024-10-01 ENCOUNTER — APPOINTMENT (OUTPATIENT)
Dept: PHARMACY | Facility: HOSPITAL | Age: 64
End: 2024-10-01
Payer: MEDICARE

## 2024-10-01 DIAGNOSIS — E11.9 TYPE 2 DIABETES MELLITUS WITHOUT COMPLICATION, WITH LONG-TERM CURRENT USE OF INSULIN (MULTI): ICD-10-CM

## 2024-10-01 DIAGNOSIS — Z79.4 TYPE 2 DIABETES MELLITUS WITHOUT COMPLICATION, WITH LONG-TERM CURRENT USE OF INSULIN (MULTI): ICD-10-CM

## 2024-10-01 RX ORDER — TIRZEPATIDE 5 MG/.5ML
5 INJECTION, SOLUTION SUBCUTANEOUS
Qty: 2 ML | Refills: 2 | Status: CANCELLED | OUTPATIENT
Start: 2024-10-01

## 2024-10-01 NOTE — PROGRESS NOTES
"Subjective     Patient ID: Wan Dunlap is a 64 y.o. male who presents for Follow-up. Patient doing well on current regimen for DMII. Established with endocrinology and will be following going forward.     Referring Provider: Thomas Samson DO     Diet: not assessed    Exercise: not assessed    No Known Allergies    Objective     Current DM Pharmacotherapy:   Mounjaro 5 mg weekly  ESPINAL?    SECONDARY PREVENTION  - Statin? Yes  - ACE-I/ARB? Yes  - Aspirin? Yes    Current monitoring regimen:   Patient is using: glucometer    Testing frequency: daily      Any episodes of hypoglycemia? No  Hypoglycemia awareness? Yes      Pertinent PMH Review:  - PMH of Pancreatitis: No  - PMH/FH of Medullary Thyroid Cancer: No  - PMH/FH of Multiple Endocrine Neoplasia (MEN) Type II: No  - PMH of Retinopathy: No  - PMH of Urinary Tract Infections/Yeast Infections: No    Patient Goals  - Fasting B - 130 mg/dL  - Postprandial BG: less than 180 mg/dL  - A1c less than 7%    Lab Review  No results found for: \"BILITOT\", \"CALCIUM\", \"CO2\", \"CL\", \"CREATININE\", \"GLUCOSE\", \"ALKPHOS\", \"K\", \"PROT\", \"NA\", \"AST\", \"ALT\", \"BUN\", \"ANIONGAP\", \"MG\", \"PHOS\", \"GGT\", \"LDH\", \"ALBUMIN\", \"AMYLASE\", \"LIPASE\", \"GFRF\", \"GFRMALE\"  No results found for: \"TRIG\", \"CHOL\", \"LDL\", \"LDLCALC\", \"HDL\"  Lab Results   Component Value Date    HGBA1C 9.3 (H) 2023     No components found for: \"UACR\"  The ASCVD Risk score (Karina CHEN, et al., 2019) failed to calculate for the following reasons:    Cannot find a previous HDL lab    Cannot find a previous total cholesterol lab      Assessment/Plan     Problem List Items Addressed This Visit       Type 2 diabetes mellitus without complication, with long-term current use of insulin (Multi)     Eating tips to reduce nausea and unwanted GI side effects from Ozempic and Mounjaro   Eat slowly  Eat smaller portions  Avoid laying down right after meal  Stop eating when feeling full  Avoid drinking from straw  Stay hydrated, drink " small amounts of water throughout the day  Avoid strenuous exercise after eating  Avoid eating close too bedtime    Meal planning tips  Avoid fried foods, and high fat meals  Focus on bland foods  Choose water rich foods like soups, kefir, protein drinks/smoothies     Lifestyle suggestions  Keep a food/symptom diary, as it may be helpful to identify meal timing of foods that make nausea worse.  Engage in light exercise (walking), get fresh air    Additional tips for alleviating nausea:  Wait at least 30 minutes after GLP1-RA dose to eat, if feeling nauseated try crackers, apples, mint, debbie root or debbie tea  Avoid strong smells    If vomiting:  Eat smaller amounts of food in more frequent meals  Stay hydrated, but avoid drinks during meals, wait 30-60 minutes before and after meals to have liquids    If having diarrhea:  Generous hydration, i.e: water with lemon and tsp baking soda  Avoid dairy products, laxatives, coffee, alcoholic beverages, soft drinks, very cold or very hot foods, products that contain sugar alcohols (sorbitol, mannitol, xylitol, maltitol), including gum and candy  Avoid (or temporarily reduce your intake of) foods with high fiber content such as grain and seed products, whole grain breads, artichokes, asparagus, beans, cabbage, lentils, mushrooms, onions, garlic, sugar snap peas, skinned fruits, apples, apricots, blackberries, cherries, didier, nectarine, pears, plum  Eat chicken broth, rice, carrots, very ripe fruit without skin  Gradually increase fiber back in when symptoms improve    If having constipation:  Ensure the amount of fiber in diet is adequate (goal is minimum of 25 grams per day).  Increase physical activity  Drink generous amounts of water    Mounjaro Education:     Counseled patient on Mounjaro MOA, expectations, side effects, duration of therapy, administration, and monitoring parameters.  Provided detailed dosing and administration counseling to ensure proper technique.    Reviewed Mounjaro titration schedule, starting with 2.5 mg once weekly to a goal of 15 mg once weekly if tolerated  Counseled patient on the benefits of GLP-1ra glycemic control and weight loss  Reviewed storage requirements of Mounjaro when not in use, and when to administer the medication if a dose is missed.  Advised patient that they may experience improved satiety after meals and portion sizes of meals may be reduced as doses of Mounjaro increase.    Type 2 diabetes mellitus, is at goal. Goal A1C: <7%    Patient established with endo and will keep this writer's number for any medication needs in the future. No scheduled follow-up    Rodolfo Cuevas PharmD [facility]  Clinical Pharmacy Specialist, Primary Care     Continue all meds under the continuation of care with the referring provider and clinic

## 2024-10-01 NOTE — PATIENT INSTRUCTIONS
Mounjaro Education:     Counseled patient on Mounjaro MOA, expectations, side effects, duration of therapy, administration, and monitoring parameters.  Provided detailed dosing and administration counseling to ensure proper technique.   Reviewed Mounjaro titration schedule, starting with 2.5 mg once weekly to a goal of 15 mg once weekly if tolerated  Counseled patient on the benefits of GLP-1ra glycemic control and weight loss  Reviewed storage requirements of Mounjaro when not in use, and when to administer the medication if a dose is missed.  Advised patient that they may experience improved satiety after meals and portion sizes of meals may be reduced as doses of Mounjaro increase.    Eating tips to reduce nausea and unwanted GI side effects from Ozempic and Mounjaro   Eat slowly  Eat smaller portions  Avoid laying down right after meal  Stop eating when feeling full  Avoid drinking from straw  Stay hydrated, drink small amounts of water throughout the day  Avoid strenuous exercise after eating  Avoid eating close too bedtime    Meal planning tips  Avoid fried foods, and high fat meals  Focus on bland foods  Choose water rich foods like soups, kefir, protein drinks/smoothies     Lifestyle suggestions  Keep a food/symptom diary, as it may be helpful to identify meal timing of foods that make nausea worse.  Engage in light exercise (walking), get fresh air    Additional tips for alleviating nausea:  Wait at least 30 minutes after GLP1-RA dose to eat, if feeling nauseated try crackers, apples, mint, debbie root or debbie tea  Avoid strong smells    If vomiting:  Eat smaller amounts of food in more frequent meals  Stay hydrated, but avoid drinks during meals, wait 30-60 minutes before and after meals to have liquids    If having diarrhea:  Generous hydration, i.e: water with lemon and tsp baking soda  Avoid dairy products, laxatives, coffee, alcoholic beverages, soft drinks, very cold or very hot foods, products that  contain sugar alcohols (sorbitol, mannitol, xylitol, maltitol), including gum and candy  Avoid (or temporarily reduce your intake of) foods with high fiber content such as grain and seed products, whole grain breads, artichokes, asparagus, beans, cabbage, lentils, mushrooms, onions, garlic, sugar snap peas, skinned fruits, apples, apricots, blackberries, cherries, didier, nectarine, pears, plum  Eat chicken broth, rice, carrots, very ripe fruit without skin  Gradually increase fiber back in when symptoms improve    If having constipation:  Ensure the amount of fiber in diet is adequate (goal is minimum of 25 grams per day).  Increase physical activity  Drink generous amounts of water

## 2024-10-08 ENCOUNTER — APPOINTMENT (OUTPATIENT)
Dept: NEUROLOGY | Facility: CLINIC | Age: 64
End: 2024-10-08
Payer: MEDICARE

## 2024-10-08 VITALS
HEIGHT: 73 IN | BODY MASS INDEX: 24.55 KG/M2 | SYSTOLIC BLOOD PRESSURE: 133 MMHG | DIASTOLIC BLOOD PRESSURE: 74 MMHG | HEART RATE: 75 BPM | WEIGHT: 185.2 LBS

## 2024-10-08 DIAGNOSIS — E11.42 DIABETIC POLYNEUROPATHY ASSOCIATED WITH TYPE 2 DIABETES MELLITUS (MULTI): ICD-10-CM

## 2024-10-08 PROCEDURE — 99214 OFFICE O/P EST MOD 30 MIN: CPT | Performed by: STUDENT IN AN ORGANIZED HEALTH CARE EDUCATION/TRAINING PROGRAM

## 2024-10-08 PROCEDURE — 3075F SYST BP GE 130 - 139MM HG: CPT | Performed by: STUDENT IN AN ORGANIZED HEALTH CARE EDUCATION/TRAINING PROGRAM

## 2024-10-08 PROCEDURE — 3078F DIAST BP <80 MM HG: CPT | Performed by: STUDENT IN AN ORGANIZED HEALTH CARE EDUCATION/TRAINING PROGRAM

## 2024-10-08 PROCEDURE — 3008F BODY MASS INDEX DOCD: CPT | Performed by: STUDENT IN AN ORGANIZED HEALTH CARE EDUCATION/TRAINING PROGRAM

## 2024-10-08 PROCEDURE — 1036F TOBACCO NON-USER: CPT | Performed by: STUDENT IN AN ORGANIZED HEALTH CARE EDUCATION/TRAINING PROGRAM

## 2024-10-08 PROCEDURE — 4010F ACE/ARB THERAPY RXD/TAKEN: CPT | Performed by: STUDENT IN AN ORGANIZED HEALTH CARE EDUCATION/TRAINING PROGRAM

## 2024-10-08 PROCEDURE — G2211 COMPLEX E/M VISIT ADD ON: HCPCS | Performed by: STUDENT IN AN ORGANIZED HEALTH CARE EDUCATION/TRAINING PROGRAM

## 2024-10-08 RX ORDER — OMEPRAZOLE 40 MG/1
1 CAPSULE, DELAYED RELEASE ORAL
COMMUNITY
Start: 2024-10-02

## 2024-10-08 RX ORDER — GLIPIZIDE 5 MG/1
1 TABLET ORAL
COMMUNITY
Start: 2024-09-26

## 2024-10-08 RX ORDER — PREGABALIN 200 MG/1
200 CAPSULE ORAL 2 TIMES DAILY
Qty: 180 CAPSULE | Refills: 2 | Status: SHIPPED | OUTPATIENT
Start: 2024-10-08

## 2024-10-08 ASSESSMENT — PATIENT HEALTH QUESTIONNAIRE - PHQ9
2. FEELING DOWN, DEPRESSED OR HOPELESS: NOT AT ALL
1. LITTLE INTEREST OR PLEASURE IN DOING THINGS: NOT AT ALL
SUM OF ALL RESPONSES TO PHQ9 QUESTIONS 1 & 2: 0

## 2024-10-08 NOTE — PATIENT INSTRUCTIONS
Thank you for your visit today. You were seen by Dr. Calrk for diabetic neuropathy. If you have any questions or need to reach me, call my office at [phone].    We discussed the following plan today:   Continue pregabalin 200 mg twice a day  Bloodwork  Return in 6 months

## 2024-10-08 NOTE — PROGRESS NOTES
"Subjective     Wan Dunlap is a right handed  64 y.o. year old male who presents with Follow-up (6 mon F/U- Neuropathy. Little bit of pain, needs refills).   Visit type: follow up visit     He aggravated his sciatica pain while on vacation recently. Otherwise, his numbness in feet has remained unchanged since previous visit. Pins in his feet has improved a bit with medication. Distribution of numbness is unchanged, no balance problems. His last A1c was 6.5 done outside.    He is taking pregabalin 200 mg BID which helps partly, no Aes.    Prior HX  Numbness of his right big toe started 5 years ago, then within a year began to involve left toe, and more recently has slowly spread to bilateral distal feet. He feels both dorsums feel cold all the time. He has pins/needles in the same distribution, as well as sharp pain. No walking, bowel or bladder issues, or focal weakness.    He has been diabetic for > 6 years. He drinks 6 beers per week. 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 He owns a [employer].     Takes gabapentin 300 mg QID - helps partly, no side effects    Previously, pregabalin 100 mg QID - helped symptoms by about 50%.    Patient Active Problem List   Diagnosis    Type 2 diabetes mellitus without complication, with long-term current use of insulin (Multi)    Primary hypertension    Other hyperlipidemia    Erectile dysfunction    Diabetic neuropathy associated with type 2 diabetes mellitus (Multi)    Sciatica of right side      Past Medical History:   Diagnosis Date    Diabetes mellitus (Multi)     Hypertension       Past Surgical History:   Procedure Laterality Date    ACHILLES TENDON SURGERY      KNEE SURGERY      TONSILLECTOMY      WRIST SURGERY        Social History     Socioeconomic History    Marital status:      Spouse name: Not on file    Number of children: Not on file    Years of education: Not on file    Highest education level: Not on file   Occupational History    " Not on file   Tobacco Use    Smoking status: Never    Smokeless tobacco: Never   Vaping Use    Vaping status: Never Used   Substance and Sexual Activity    Alcohol use: Yes     Alcohol/week: 6.0 standard drinks of alcohol     Types: 6 Cans of beer per week     Comment: Occassionally    Drug use: Not Currently     Types: Marijuana    Sexual activity: Defer   Other Topics Concern    Not on file   Social History Narrative    Not on file     Social Determinants of Health     Financial Resource Strain: Low Risk  (3/8/2023)    Received from [facility]    Overall Financial Resource Strain (CARDIA)     Difficulty of Paying Living Expenses: Not hard at all   Food Insecurity: No Food Insecurity (3/8/2023)    Received from [facility]    Hunger Vital Sign     Worried About Running Out of Food in the Last Year: Never true     Ran Out of Food in the Last Year: Never true   Transportation Needs: Unknown (3/8/2023)    Received from [facility]    PRAPARE - Transportation     Lack of Transportation (Medical): Not on file     Lack of Transportation (Non-Medical): No   Physical Activity: Not on file   Stress: Not on file   Social Connections: Not on file   Intimate Partner Violence: Not on file   Housing Stability: Unknown (3/8/2023)    Received from [facility]    Housing Stability Vital Sign     Unable to Pay for Housing in the Last Year: Not on file     Number of Places Lived in the Last Year: Not on file     Unstable Housing in the Last Year: No      Family History   Problem Relation Name Age of Onset    Heart attack Mother      Cataracts Father      Heart attack Brother        Patient Health Questionnaire-2 Score: 0          Review of Systems  All other system have been reviewed and are negative for  "complaint.    Vitals:    10/08/24 0903   BP: 133/74   BP Location: Left arm   Patient Position: Sitting   BP Cuff Size: Adult   Pulse: 75   Weight: 84 kg (185 lb 3.2 oz)   Height: 1.854 m (6' 1\")     Objective   Neurological Exam  Mental Status  Awake, alert and oriented to person, place and time. Speech is normal. Language is fluent with no aphasia. Attention and concentration are normal.    Cranial Nerves  CN II: Visual fields full to confrontation.  CN III, IV, VI: Extraocular movements intact bilaterally. Normal saccades. Normal smooth pursuit. Normal lids and orbits bilaterally. Pupils equal round and reactive to light bilaterally.  CN V:  Right: Facial sensation is normal.  Left: Facial sensation is normal on the left.  CN VII: Full and symmetric facial movement.  CN VIII: Hearing is normal.  CN IX, X: Palate elevates symmetrically  CN XI: Shoulder shrug strength is normal.  CN XII: Tongue midline without atrophy or fasciculations.    Motor  Normal muscle bulk throughout. No fasciculations present. Normal muscle tone. No abnormal involuntary movements.                                               Right                     Left   Shoulder abduction               5                          5  Elbow flexion                         5                          5  Elbow extension                    5                          5  Finger flexion                         5                          5  Finger extension                    5                          5  Finger abduction                    5                          5  Hip flexion                              5                          5  Knee flexion                           5                          5  Plantarflexion                         5                          5  Dorsiflexion                            5                          5    Sensory  Reduced pinprick over distal feet. Normal vibration in distal lower extremities.     Reflexes                "                             Right                      Left  Biceps                                 2+                         2+  Triceps                                2+                         2+  Patellar                                2+                         2+  Achilles                                0                         0  Right Plantar: downgoing  Left Plantar: downgoing    Right pathological reflexes: Allen's absent.  Left pathological reflexes: Allen's absent.    Coordination  Right: Finger-to-nose normal. Heel-to-shin normal.Left: Finger-to-nose normal. Heel-to-shin normal.    Gait  Casual gait is normal including stance, stride, and arm swing. Normal tandem gait. Romberg is absent.                          Hemoglobin A1C   Date Value Ref Range Status   09/13/2023 9.3 (H) 4.8 - 5.9 % Final           Assessment/Plan   Diagnoses and all orders for this visit:  Diabetic polyneuropathy associated with type 2 diabetes mellitus (CMS/HCC)  -     Vitamin B12; Future  -     Methylmalonic Acid; Future  -     Serum Protein Electrophoresis + Immunofixation; Future    Wan Dunlap is a 64 y.o. year old male with DM2 since 2018, HTN, HLD, here for evaluation of distal numbness and painful paresthesia likely related to mild diabetic polyneuropathy. It seems to be slowly progressive and his A1c has improved.    We discussed the following plan today:   Continue pregabalin 200 mg twice a day  Bloodwork  Return in 6 months

## 2024-11-21 DIAGNOSIS — E78.2 MIXED HYPERLIPIDEMIA: ICD-10-CM

## 2024-11-21 DIAGNOSIS — N52.9 MALE ERECTILE DYSFUNCTION, UNSPECIFIED: Primary | ICD-10-CM

## 2024-11-21 DIAGNOSIS — R53.82 CHRONIC FATIGUE, UNSPECIFIED: ICD-10-CM

## 2024-11-21 DIAGNOSIS — E11.65 TYPE 2 DIABETES MELLITUS WITH HYPERGLYCEMIA (MULTI): ICD-10-CM

## 2024-11-21 DIAGNOSIS — E55.9 VITAMIN D DEFICIENCY, UNSPECIFIED: ICD-10-CM

## 2025-01-23 DIAGNOSIS — I10 PRIMARY HYPERTENSION: ICD-10-CM

## 2025-01-23 RX ORDER — POTASSIUM CHLORIDE 20 MEQ/1
20 TABLET, EXTENDED RELEASE ORAL DAILY
Qty: 90 TABLET | Refills: 0 | Status: SHIPPED | OUTPATIENT
Start: 2025-01-23

## 2025-02-10 NOTE — PROGRESS NOTES
9/14/2023    Assessment:       Diagnosis Orders   1. Type 2 diabetes mellitus with other specified complication, with long-term current use of insulin ([facility])  POCT Glucose            PLAN:     Orders Placed This Encounter   Procedures    POCT Glucose     Orders Placed This Encounter   Procedures    Basic Metabolic Panel     Standing Status:   Future     Standing Expiration Date:   9/14/2024    Hemoglobin A1C     Standing Status:   Future     Standing Expiration Date:   9/14/2024    Microalbumin / Creatinine Urine Ratio     Standing Status:   Future     Standing Expiration Date:   9/14/2024    POCT Glucose     Continue current dose of Novolin 70/30 24 units with each meals patient to consider insulin pump in the next few months          Orders Placed This Encounter   Procedures    POCT Glucose     No orders of the defined types were placed in this encounter. No follow-ups on file. Subjective:     Chief Complaint   Patient presents with    Diabetes     Vitals:    09/14/23 1432   BP: 126/75   Pulse: 79   SpO2: 96%   Weight: 191 lb (86.6 kg)   Height: 6' 1\" (1.854 m)     Wt Readings from Last 3 Encounters:   09/14/23 191 lb (86.6 kg)   08/21/23 187 lb (84.8 kg)   06/15/23 183 lb (83 kg)     BP Readings from Last 3 Encounters:   09/14/23 126/75   08/21/23 130/80   06/15/23 125/85     Follow-up diabetes patient Novolin 70/30 24 units 3 times a day A1c was 9.3 blood sugars approximately close to 204 average might consider insulin pump  After his insurance changes    Diabetes  He presents for his follow-up diabetic visit. He has type 2 diabetes mellitus. His disease course has been fluctuating. Pertinent negatives for diabetes include no polyuria, no visual change and no weight loss. Symptoms are worsening. Current diabetic treatment includes insulin injections. He is currently taking insulin pre-breakfast, pre-lunch and pre-dinner. He is following a generally healthy diet. His overall blood glucose range is >200 mg/dl. [FreeTextEntry1] : Impression 1.4 cm part solid right upper lobe nodule -No other nodules -No increased activity on PET/CT, see report Never smoked  Recommend Follow-up CT of the chest -I will see her again in a few months Advised to see GI regarding the findings at the GE junction Follow-up with her breast doctor and GYN also advised  Images reviewed.  The above was discussed with her and her .

## 2025-02-27 DIAGNOSIS — I10 PRIMARY HYPERTENSION: ICD-10-CM

## 2025-02-27 RX ORDER — LISINOPRIL 20 MG/1
20 TABLET ORAL DAILY
Qty: 90 TABLET | Refills: 0 | Status: SHIPPED | OUTPATIENT
Start: 2025-02-27

## 2025-02-27 NOTE — TELEPHONE ENCOUNTER
Please schedule an appointment for review   of all med problems ...have sent in 90-day supply.  In the event that you cannot get in before this 90-day supply please call my office.Thanks much Dr. Samson.

## 2025-03-03 ENCOUNTER — APPOINTMENT (OUTPATIENT)
Dept: PRIMARY CARE | Facility: CLINIC | Age: 65
End: 2025-03-03
Payer: MEDICARE

## 2025-03-03 VITALS
TEMPERATURE: 97 F | BODY MASS INDEX: 25.05 KG/M2 | WEIGHT: 189 LBS | HEIGHT: 73 IN | RESPIRATION RATE: 18 BRPM | HEART RATE: 65 BPM | OXYGEN SATURATION: 96 % | SYSTOLIC BLOOD PRESSURE: 115 MMHG | DIASTOLIC BLOOD PRESSURE: 74 MMHG

## 2025-03-03 DIAGNOSIS — E11.9 TYPE 2 DIABETES MELLITUS WITHOUT COMPLICATION, WITH LONG-TERM CURRENT USE OF INSULIN (MULTI): Primary | ICD-10-CM

## 2025-03-03 DIAGNOSIS — Z12.5 SCREENING FOR PROSTATE CANCER: ICD-10-CM

## 2025-03-03 DIAGNOSIS — E78.49 OTHER HYPERLIPIDEMIA: ICD-10-CM

## 2025-03-03 DIAGNOSIS — I10 PRIMARY HYPERTENSION: ICD-10-CM

## 2025-03-03 DIAGNOSIS — M25.511 ACUTE PAIN OF RIGHT SHOULDER: ICD-10-CM

## 2025-03-03 DIAGNOSIS — N52.2 DRUG-INDUCED ERECTILE DYSFUNCTION: ICD-10-CM

## 2025-03-03 DIAGNOSIS — Z79.4 TYPE 2 DIABETES MELLITUS WITHOUT COMPLICATION, WITH LONG-TERM CURRENT USE OF INSULIN (MULTI): Primary | ICD-10-CM

## 2025-03-03 PROCEDURE — 3078F DIAST BP <80 MM HG: CPT | Performed by: FAMILY MEDICINE

## 2025-03-03 PROCEDURE — 3074F SYST BP LT 130 MM HG: CPT | Performed by: FAMILY MEDICINE

## 2025-03-03 PROCEDURE — 4010F ACE/ARB THERAPY RXD/TAKEN: CPT | Performed by: FAMILY MEDICINE

## 2025-03-03 PROCEDURE — 99214 OFFICE O/P EST MOD 30 MIN: CPT | Performed by: FAMILY MEDICINE

## 2025-03-03 PROCEDURE — 83036 HEMOGLOBIN GLYCOSYLATED A1C: CPT | Performed by: FAMILY MEDICINE

## 2025-03-03 PROCEDURE — 3008F BODY MASS INDEX DOCD: CPT | Performed by: FAMILY MEDICINE

## 2025-03-03 RX ORDER — LISINOPRIL 20 MG/1
20 TABLET ORAL DAILY
Qty: 90 TABLET | Refills: 0 | Status: SHIPPED | OUTPATIENT
Start: 2025-03-03

## 2025-03-03 ASSESSMENT — PATIENT HEALTH QUESTIONNAIRE - PHQ9
1. LITTLE INTEREST OR PLEASURE IN DOING THINGS: NOT AT ALL
2. FEELING DOWN, DEPRESSED OR HOPELESS: NOT AT ALL
SUM OF ALL RESPONSES TO PHQ9 QUESTIONS 1 AND 2: 0

## 2025-03-03 NOTE — PROGRESS NOTES
Subjective   Patient ID: Wan Dunlap is a 64 y.o. male who presents for med refills.  HPI  Back  pain  is  much     improved  Went to pt  .    Patient is also here for follow-up of hypertension.. Symptoms do not include headache confusion visual disturbance dizziness shortness of breath chest pain syncope or palpitations. Recent blood pressure patient has not been checking. By report there is good compliance with treatment. Pertinent medical history includes diabetes/hyperlipidemia ..    Patient is also here for follow-up of hyperlipidemia. The most recent measurements for this patient would take it on.. 11/23/22At that time.. Total cholesterol..126.. HDL 37   LDL  52  triglycerides  187     .... Associated symptoms do not include chest pain, Cramps with exertion, myalgias or nausea. Current treatment includes statins. By report there is good compliance with treatment. Pertinent medical history includes diabetes and hypertension   Erectile dysfunction   and     didn't go to  urology ??? med      Wants to referral     to see    cardiology      The reflux esophagitis he has been occurring in a recurrent pattern for years. The course has been without change. The reflux is described as mild to moderate. The patient remains compliant on meds.. The patient takes medications in a stepup stepdown fashion. Denies dysphagia hoarseness or odynophagia...     Not drinking  as  much   Patient is here for a recheck of diabetes.hgba1c  was       checks his blood sugars.  once debbie.ly . Highest sugar 220was    low sugar.. 110   remains compliant on his medications.,,, Hypoglycemia has occurred rarely or never.... Symptoms do not include polydipsia, polyuria, blurred vision, numbness and   yes  tingling in the toes, prescribed due to  denies . . ..increased appetite, weight gain, weight loss, infections or foot problems.. baby aspirin   81mg........    eye 12/24exam          feet problems numbness in toes       Review of Systems  All  other  pertinent  systems reviewed and are negative except  those  mentioned  in HPI   Objective   Physical Exam  Vitals: I have reviewed the vitals  General: Well-developed.  In no acute distress.  Eyes:   sclera nonicteric.  Conjunctiva not injected.  No discharge.   HEAD: Normocephalic, atraumatic.  HEENT   Mucous membranes moist.  Posterior oropharynx nonerythematous, no tonsillar exudates.      No cervical lymphadenopathy.  Cardio: Regular rate and rhythm.  No murmur, rub or gallop.  Pulmonary: Lungs clear to auscultation in all fields.  No accessory muscle use.  GI/: Normal active bowel sounds.  Soft, nontender.  No masses or organomegaly appreciated.  Musculoskeletal: No gross deformities appreciated.  Neuro: Alert, age-appropriate.  Normal muscle tone.  Moving all extremities.  Skin: No rash, bruises or lesions.   Labs        Assessment/Plan   Problem List Items Addressed This Visit       Type 2 diabetes mellitus without complication, with long-term current use of insulin (Multi)     Not drinking  as  much   Patient is here for a recheck of diabetes.hgba1c  was       checks his blood sugars.  once debbie.ly . Highest sugar 220was    low sugar.. 110   remains compliant on his medications.,,, Hypoglycemia has occurred rarely or never.... Symptoms do not include polydipsia, polyuria, blurred vision, numbness and   yes  tingling in the toes, prescribed due to denies .. ..increased appetite, weight gain, weight loss, infections or foot problems.. baby aspirin   81mg........    eye 12/24exam          feet problems numbness in toes.. PER DR FAJARDO          Relevant Orders    Referral to Cardiology    CBC and Auto Differential    Comprehensive Metabolic Panel    Lipid Panel    Magnesium    Albumin-Creatinine Ratio, Urine Random    Primary hypertension     Patient is also here for follow-up of hypertension.. Symptoms do not include headache confusion visual disturbance dizziness shortness of breath chest pain syncope  or palpitations. Recent blood pressure patient has not been checking. By report there is good compliance with treatment. Pertinent medical history includes diabetes/hyperlipidemia ..stable and continue meds          Relevant Medications    lisinopril 20 mg tablet    Other hyperlipidemia     Patient is also here for follow-up of hyperlipidemia. The most recent measurements for this patient would take it on.. 11/23/22At that time.. Total cholesterol..126.. HDL 37   LDL  52  triglycerides  187     .... Associated symptoms do not include chest pain, Cramps with exertion, myalgias or nausea. Current treatment includes statins. By report there is good compliance with treatment. Pertinent medical history includes diabetes and hypertension lets repeat labs to see if changes         Erectile dysfunction - Primary     Erectile dysfunction   and     didn't go to  urology ??? med discussed medications and pros and cons of surgical intervention         Relevant Orders    Referral to Urology     Other Visit Diagnoses       Acute pain of right shoulder        Relevant Orders    Referral to Orthopaedic Surgery    Screening for prostate cancer        Relevant Orders    Prostate Specific Antigen, Screen

## 2025-03-04 LAB — POC HEMOGLOBIN A1C: 7.8 % (ref 4.2–6.5)

## 2025-03-04 NOTE — ASSESSMENT & PLAN NOTE
Patient is also here for follow-up of hyperlipidemia. The most recent measurements for this patient would take it on.. 11/23/22At that time.. Total cholesterol..126.. HDL 37   LDL  52  triglycerides  187     .... Associated symptoms do not include chest pain, Cramps with exertion, myalgias or nausea. Current treatment includes statins. By report there is good compliance with treatment. Pertinent medical history includes diabetes and hypertension lets repeat labs to see if changes

## 2025-03-04 NOTE — PATIENT INSTRUCTIONS

## 2025-03-04 NOTE — ASSESSMENT & PLAN NOTE
Erectile dysfunction   and     didn't go to  urology ??? med discussed medications and pros and cons of surgical intervention

## 2025-03-04 NOTE — ASSESSMENT & PLAN NOTE
Patient is also here for follow-up of hypertension.. Symptoms do not include headache confusion visual disturbance dizziness shortness of breath chest pain syncope or palpitations. Recent blood pressure patient has not been checking. By report there is good compliance with treatment. Pertinent medical history includes diabetes/hyperlipidemia ..stable and continue meds

## 2025-03-04 NOTE — ASSESSMENT & PLAN NOTE
Not drinking  as  much   Patient is here for a recheck of diabetes.hgba1c  was       checks his blood sugars.  once debbie.ly . Highest sugar 220was    low sugar.. 110   remains compliant on his medications.,,, Hypoglycemia has occurred rarely or never.... Symptoms do not include polydipsia, polyuria, blurred vision, numbness and   yes  tingling in the toes, prescribed due to denies .. ..increased appetite, weight gain, weight loss, infections or foot problems.. baby aspirin   81mg........    eye 12/24exam          feet problems numbness in toes.. PER DR FAJARDO

## 2025-03-06 ENCOUNTER — LAB (OUTPATIENT)
Dept: LAB | Facility: HOSPITAL | Age: 65
End: 2025-03-06
Payer: MEDICARE

## 2025-03-06 DIAGNOSIS — E11.42 DIABETIC POLYNEUROPATHY ASSOCIATED WITH TYPE 2 DIABETES MELLITUS (MULTI): ICD-10-CM

## 2025-03-06 PROCEDURE — 84155 ASSAY OF PROTEIN SERUM: CPT

## 2025-03-07 LAB
ALBUMIN SERPL-MCNC: 4.8 G/DL (ref 3.6–5.1)
ALBUMIN/CREAT UR: 13 MG/G CREAT
ALP SERPL-CCNC: 79 U/L (ref 35–144)
ALT SERPL-CCNC: 22 U/L (ref 9–46)
ANION GAP SERPL CALCULATED.4IONS-SCNC: 8 MMOL/L (CALC) (ref 7–17)
AST SERPL-CCNC: 17 U/L (ref 10–35)
BASOPHILS # BLD AUTO: 29 CELLS/UL (ref 0–200)
BASOPHILS NFR BLD AUTO: 0.4 %
BILIRUB SERPL-MCNC: 1.3 MG/DL (ref 0.2–1.2)
BUN SERPL-MCNC: 20 MG/DL (ref 7–25)
CALCIUM SERPL-MCNC: 9.5 MG/DL (ref 8.6–10.3)
CHLORIDE SERPL-SCNC: 103 MMOL/L (ref 98–110)
CHOLEST SERPL-MCNC: 131 MG/DL
CHOLEST/HDLC SERPL: 3.4 (CALC)
CO2 SERPL-SCNC: 26 MMOL/L (ref 20–32)
CREAT SERPL-MCNC: 0.88 MG/DL (ref 0.7–1.35)
CREAT UR-MCNC: 253 MG/DL (ref 20–320)
EGFRCR SERPLBLD CKD-EPI 2021: 97 ML/MIN/1.73M2
EOSINOPHIL # BLD AUTO: 88 CELLS/UL (ref 15–500)
EOSINOPHIL NFR BLD AUTO: 1.2 %
ERYTHROCYTE [DISTWIDTH] IN BLOOD BY AUTOMATED COUNT: 12.9 % (ref 11–15)
GLUCOSE SERPL-MCNC: 218 MG/DL (ref 65–99)
HCT VFR BLD AUTO: 47 % (ref 38.5–50)
HDLC SERPL-MCNC: 38 MG/DL
HGB BLD-MCNC: 16.1 G/DL (ref 13.2–17.1)
LDLC SERPL CALC-MCNC: 56 MG/DL (CALC)
LYMPHOCYTES # BLD AUTO: 2037 CELLS/UL (ref 850–3900)
LYMPHOCYTES NFR BLD AUTO: 27.9 %
MAGNESIUM SERPL-MCNC: 2 MG/DL (ref 1.5–2.5)
MCH RBC QN AUTO: 30.1 PG (ref 27–33)
MCHC RBC AUTO-ENTMCNC: 34.3 G/DL (ref 32–36)
MCV RBC AUTO: 88 FL (ref 80–100)
METHYLMALONATE SERPL-SCNC: NORMAL
MICROALBUMIN UR-MCNC: 3.3 MG/DL
MONOCYTES # BLD AUTO: 526 CELLS/UL (ref 200–950)
MONOCYTES NFR BLD AUTO: 7.2 %
NEUTROPHILS # BLD AUTO: 4621 CELLS/UL (ref 1500–7800)
NEUTROPHILS NFR BLD AUTO: 63.3 %
NONHDLC SERPL-MCNC: 93 MG/DL (CALC)
PLATELET # BLD AUTO: 146 THOUSAND/UL (ref 140–400)
PMV BLD REES-ECKER: 12.3 FL (ref 7.5–12.5)
POTASSIUM SERPL-SCNC: 4.5 MMOL/L (ref 3.5–5.3)
PROT SERPL-MCNC: 7.3 G/DL (ref 6.1–8.1)
PROT SERPL-MCNC: 7.5 G/DL (ref 6.4–8.2)
PSA SERPL-MCNC: 0.43 NG/ML
RBC # BLD AUTO: 5.34 MILLION/UL (ref 4.2–5.8)
SODIUM SERPL-SCNC: 137 MMOL/L (ref 135–146)
TRIGL SERPL-MCNC: 351 MG/DL
VIT B12 SERPL-MCNC: 612 PG/ML (ref 200–1100)
WBC # BLD AUTO: 7.3 THOUSAND/UL (ref 3.8–10.8)

## 2025-03-10 ENCOUNTER — HOSPITAL ENCOUNTER (OUTPATIENT)
Dept: RADIOLOGY | Facility: CLINIC | Age: 65
Discharge: HOME | End: 2025-03-10
Payer: MEDICARE

## 2025-03-10 ENCOUNTER — OFFICE VISIT (OUTPATIENT)
Dept: ORTHOPEDIC SURGERY | Facility: CLINIC | Age: 65
End: 2025-03-10
Payer: MEDICARE

## 2025-03-10 DIAGNOSIS — M25.511 RIGHT SHOULDER PAIN, UNSPECIFIED CHRONICITY: ICD-10-CM

## 2025-03-10 DIAGNOSIS — M75.102 NONTRAUMATIC TEAR OF LEFT ROTATOR CUFF, UNSPECIFIED TEAR EXTENT: Primary | ICD-10-CM

## 2025-03-10 DIAGNOSIS — M25.511 ACUTE PAIN OF RIGHT SHOULDER: ICD-10-CM

## 2025-03-10 PROCEDURE — 73030 X-RAY EXAM OF SHOULDER: CPT | Mod: LEFT SIDE | Performed by: STUDENT IN AN ORGANIZED HEALTH CARE EDUCATION/TRAINING PROGRAM

## 2025-03-10 PROCEDURE — 4010F ACE/ARB THERAPY RXD/TAKEN: CPT | Performed by: STUDENT IN AN ORGANIZED HEALTH CARE EDUCATION/TRAINING PROGRAM

## 2025-03-10 PROCEDURE — 2500000004 HC RX 250 GENERAL PHARMACY W/ HCPCS (ALT 636 FOR OP/ED): Performed by: STUDENT IN AN ORGANIZED HEALTH CARE EDUCATION/TRAINING PROGRAM

## 2025-03-10 PROCEDURE — 20610 DRAIN/INJ JOINT/BURSA W/O US: CPT | Mod: LT | Performed by: STUDENT IN AN ORGANIZED HEALTH CARE EDUCATION/TRAINING PROGRAM

## 2025-03-10 PROCEDURE — 99214 OFFICE O/P EST MOD 30 MIN: CPT | Mod: 25 | Performed by: STUDENT IN AN ORGANIZED HEALTH CARE EDUCATION/TRAINING PROGRAM

## 2025-03-10 PROCEDURE — 99204 OFFICE O/P NEW MOD 45 MIN: CPT | Performed by: STUDENT IN AN ORGANIZED HEALTH CARE EDUCATION/TRAINING PROGRAM

## 2025-03-10 PROCEDURE — 73030 X-RAY EXAM OF SHOULDER: CPT | Mod: LT

## 2025-03-10 RX ORDER — LIDOCAINE HYDROCHLORIDE 10 MG/ML
4 INJECTION, SOLUTION INFILTRATION; PERINEURAL
Status: COMPLETED | OUTPATIENT
Start: 2025-03-10 | End: 2025-03-10

## 2025-03-10 RX ORDER — TRIAMCINOLONE ACETONIDE 40 MG/ML
40 INJECTION, SUSPENSION INTRA-ARTICULAR; INTRAMUSCULAR
Status: COMPLETED | OUTPATIENT
Start: 2025-03-10 | End: 2025-03-10

## 2025-03-10 RX ADMIN — LIDOCAINE HYDROCHLORIDE 4 ML: 10 INJECTION, SOLUTION INFILTRATION; PERINEURAL at 10:25

## 2025-03-10 RX ADMIN — TRIAMCINOLONE ACETONIDE 40 MG: 40 INJECTION, SUSPENSION INTRA-ARTICULAR; INTRAMUSCULAR at 10:25

## 2025-03-10 NOTE — PROGRESS NOTES
HPI  64-year-old male presents today for evaluation of his left shoulder.  Patient was moving a heavy object 2023 felt a pop about his shoulder and is never been the same since.  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  Has been able to complete his job however been having worsening shoulder pain as of late.  Currently his busy season.  Pain primary about the shoulder region does occasionally wake him up from sleep.    Past Medical History:   Diagnosis Date    Diabetes mellitus (Multi)     Hypertension        Past Surgical History:   Procedure Laterality Date    ACHILLES TENDON SURGERY      KNEE SURGERY      TONSILLECTOMY      WRIST SURGERY          No Known Allergies     Physical exam    General: Alert and oriented to place, person, and time.  No acute distress and breathing comfortably; pleasant and cooperative with the examination.  HEENT: Head is normocephalic and atraumatic.  Neck: Supple, no visible swelling.  Cardiovascular: Good perfusion to the affected extremity.  Lungs: No audible wheezing or labored breathing.  Abdomen: Nondistended  HEME/Lymph : No visible abnormalities bilateral lower extremity    Extremity:  Left shoulder:     Skin healthy to gross inspection  No ecchymosis, no swelling, no gross atrophy     No tenderness to palpation over acromioclavicular joint  No tenderness to palpation over biceps tendon  No tenderness to palpation over the cervical spine      ROM:  No significant decrease in forward flexion, internal or external rotation      Strength:  4/5 Resisted elevation  5/5 Resisted external rotation  Negative lift off test   Negative Spurling´s test  Positive Neer and Hawking´s test  Mild pain with Speed's test  Neurovascular exam normal distally    Diagnostics:  X-rays ordered and collected in clinic today my independent interpretation as follows: No acute osseous abnormality no fracture dislocation appreciated maintained joint space within the glenohumeral  articulation    Procedure:  L Inj/Asp: L subacromial bursa on 3/10/2025 10:25 AM  Indications: pain  Details: 22 G needle, posterior approach  Medications: 40 mg triamcinolone acetonide 40 mg/mL; 4 mL lidocaine 10 mg/mL (1 %)  Consent was given by the patient. Immediately prior to procedure a time out was called to verify the correct patient, procedure, equipment, support staff and site/side marked as required. Patient was prepped and draped in the usual sterile fashion.           Assessment:  64-year-old male with concerns for chronic left shoulder rotator cuff tear traumatic etiology    Treatment plan:  The natural history of the condition and its associated treatment alternatives including surgical and nonsurgical options were discussed with the patient at length.  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  Did discuss use of an MRI to further help us elucidate pain source.  Given findings of mild weakness as well as history I do think that MRI would likely be appropriate at some point in time.  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

## 2025-03-11 ENCOUNTER — TELEPHONE (OUTPATIENT)
Dept: PRIMARY CARE | Facility: CLINIC | Age: 65
End: 2025-03-11
Payer: MEDICARE

## 2025-03-11 DIAGNOSIS — E11.9 TYPE 2 DIABETES MELLITUS WITHOUT COMPLICATION, WITH LONG-TERM CURRENT USE OF INSULIN (MULTI): Primary | ICD-10-CM

## 2025-03-11 DIAGNOSIS — Z79.4 TYPE 2 DIABETES MELLITUS WITHOUT COMPLICATION, WITH LONG-TERM CURRENT USE OF INSULIN (MULTI): Primary | ICD-10-CM

## 2025-03-11 LAB
METHYLMALONATE SERPL-SCNC: 101 NMOL/L (ref 69–390)
VIT B12 SERPL-MCNC: 612 PG/ML (ref 200–1100)

## 2025-03-12 LAB
ALBUMIN: 4.7 G/DL (ref 3.4–5)
ALPHA 1 GLOBULIN: 0.2 G/DL (ref 0.2–0.6)
ALPHA 2 GLOBULIN: 0.7 G/DL (ref 0.4–1.1)
BETA GLOBULIN: 0.9 G/DL (ref 0.5–1.2)
GAMMA GLOBULIN: 1 G/DL (ref 0.5–1.4)
IMMUNOFIXATION COMMENT: NORMAL
PATH REVIEW - SERUM IMMUNOFIXATION: NORMAL
PATH REVIEW-SERUM PROTEIN ELECTROPHORESIS: NORMAL
PROTEIN ELECTROPHORESIS COMMENT: NORMAL

## 2025-03-17 ENCOUNTER — APPOINTMENT (OUTPATIENT)
Dept: UROLOGY | Facility: CLINIC | Age: 65
End: 2025-03-17
Payer: MEDICARE

## 2025-03-17 VITALS
SYSTOLIC BLOOD PRESSURE: 128 MMHG | HEART RATE: 77 BPM | HEIGHT: 74 IN | BODY MASS INDEX: 23.99 KG/M2 | DIASTOLIC BLOOD PRESSURE: 78 MMHG | WEIGHT: 186.9 LBS

## 2025-03-17 DIAGNOSIS — Z87.442 PERSONAL HISTORY OF KIDNEY STONES: Primary | ICD-10-CM

## 2025-03-17 DIAGNOSIS — N52.9 ERECTILE DYSFUNCTION, UNSPECIFIED ERECTILE DYSFUNCTION TYPE: ICD-10-CM

## 2025-03-17 PROCEDURE — 1036F TOBACCO NON-USER: CPT

## 2025-03-17 PROCEDURE — 99203 OFFICE O/P NEW LOW 30 MIN: CPT

## 2025-03-17 PROCEDURE — 3008F BODY MASS INDEX DOCD: CPT

## 2025-03-17 PROCEDURE — 3078F DIAST BP <80 MM HG: CPT

## 2025-03-17 PROCEDURE — 4010F ACE/ARB THERAPY RXD/TAKEN: CPT

## 2025-03-17 PROCEDURE — 3074F SYST BP LT 130 MM HG: CPT

## 2025-03-17 RX ORDER — TADALAFIL 20 MG/1
20 TABLET ORAL
Qty: 15 TABLET | Refills: 3 | Status: SHIPPED | OUTPATIENT
Start: 2025-03-17 | End: 2025-07-15

## 2025-03-17 RX ORDER — TADALAFIL 5 MG/1
5 TABLET ORAL DAILY
Qty: 30 TABLET | Refills: 11 | Status: SHIPPED | OUTPATIENT
Start: 2025-03-17 | End: 2026-03-12

## 2025-03-17 NOTE — PATIENT INSTRUCTIONS
Someone from the  Urology team will contact you to schedule an appt with Tahmina Borrego CNP.  Manual Pump  Vacuum pump  Use most days of the week, pumping 3-4x times and holding for 45 min  Hold it there until you're almost “uncomfortable”  [employer], adult stores, etc  Constriction Band  [employer]  [employer]  Injections (TRIMIX)  1-4 drugs  15-45 min to become effective  Can be customizable

## 2025-03-17 NOTE — PROGRESS NOTES
Subjective   Patient ID: Wan Dunlap is a 64 y.o. male who presents for Drug induced ED.    Patient presents with erectile dysfunction.  Hx of diabetic with neuropathy and HTN (on BP pills).  Patient has tried both Viagra and Cialis, he felt the Viagra worked better but states he wasn't taking the Cialis correctly.  Right now he's not taking any medication.  This started 2 years ago, issues with both achieving and maintaining erections.  The Viagra worked within the first hour but then his erection doesn't last.  His erections don't last through intercourse.  Rare spontaneous erections.  He doesn't always make it to orgasm.  No painful erections.  No curvature.  He's  and his wife is very supportive.  No nitroglycerin use.  No smoking hx.    No daytime frequency.  Nocturia 1-2x.  No urgency or incontinence.  Fair stream.  No straining or bearing down to initiate stream.  Hx of kidney stones, has always passed stones on his own.  Has not had issues with stone in many years.  No recurrent UTIs.  No dysuria.  No gross hematuria.  Father had prostate cancer.  Patient's PSA = 0.43.      Review of Systems   All other systems reviewed and are negative.    Objective   Physical Exam  Vitals reviewed.       Vitals:    03/17/25 1418   BP: 128/78   Pulse: 77     Alert and oriented x3  No acute distress, cooperative  Breathes easily on room air  Normal range of motion  No focal neurological deficits  Appears stated age    Lab Results   Component Value Date    PSA 0.43 03/06/2025     Assessment/Plan   Diagnoses and all orders for this visit:  Personal history of kidney stones       - Stone prevention  Erectile dysfunction, unspecified erectile dysfunction type  -     Referral to Urology  -     tadalafil 20 mg tablet; Take 1 tablet (20 mg) by mouth every other day if needed for erectile dysfunction.  -     tadalafil (Cialis) 5 mg tablet; Take 1 tablet (5 mg) by mouth once daily.  -     Referral to Urology; Future  (Tahmina Borrego, CNP)    Patient with hx of ED presents for further mngt after trying both Viagra and Cialis.  Discussed multiple options including oral meds, injectable meds, penile prosthesis, constriction band, manual pump, body positioning.  Patient would like to try Cialis 5mg every day with Cialis 20mg prn.  In the meantime he will read more about Trimix which he might be interested in pursuing.  I did explain that with his comorbidities I'm not sure oral meds alone will help his ED but he is willing to try it first.  He will follow-up with Martha to further discuss Trimix.  Patient agrees with plan and all questions answered.    Maricruz Wells PA-C 03/17/25 2:23 PM

## 2025-03-25 DIAGNOSIS — I10 PRIMARY HYPERTENSION: ICD-10-CM

## 2025-03-25 RX ORDER — ATORVASTATIN CALCIUM 20 MG/1
20 TABLET, FILM COATED ORAL DAILY
Qty: 90 TABLET | Refills: 0 | Status: SHIPPED | OUTPATIENT
Start: 2025-03-25

## 2025-04-01 ENCOUNTER — APPOINTMENT (OUTPATIENT)
Dept: NEUROLOGY | Facility: CLINIC | Age: 65
End: 2025-04-01
Payer: MEDICARE

## 2025-04-01 VITALS
DIASTOLIC BLOOD PRESSURE: 81 MMHG | HEIGHT: 74 IN | SYSTOLIC BLOOD PRESSURE: 144 MMHG | WEIGHT: 189.6 LBS | HEART RATE: 64 BPM | BODY MASS INDEX: 24.33 KG/M2

## 2025-04-01 DIAGNOSIS — Z79.4 TYPE 2 DIABETES MELLITUS WITHOUT COMPLICATION, WITH LONG-TERM CURRENT USE OF INSULIN: Primary | ICD-10-CM

## 2025-04-01 DIAGNOSIS — E11.9 TYPE 2 DIABETES MELLITUS WITHOUT COMPLICATION, WITH LONG-TERM CURRENT USE OF INSULIN: Primary | ICD-10-CM

## 2025-04-01 DIAGNOSIS — E11.42 DIABETIC POLYNEUROPATHY ASSOCIATED WITH TYPE 2 DIABETES MELLITUS: Primary | ICD-10-CM

## 2025-04-01 PROCEDURE — 3079F DIAST BP 80-89 MM HG: CPT | Performed by: STUDENT IN AN ORGANIZED HEALTH CARE EDUCATION/TRAINING PROGRAM

## 2025-04-01 PROCEDURE — 4010F ACE/ARB THERAPY RXD/TAKEN: CPT | Performed by: STUDENT IN AN ORGANIZED HEALTH CARE EDUCATION/TRAINING PROGRAM

## 2025-04-01 PROCEDURE — G2211 COMPLEX E/M VISIT ADD ON: HCPCS | Performed by: STUDENT IN AN ORGANIZED HEALTH CARE EDUCATION/TRAINING PROGRAM

## 2025-04-01 PROCEDURE — 1036F TOBACCO NON-USER: CPT | Performed by: STUDENT IN AN ORGANIZED HEALTH CARE EDUCATION/TRAINING PROGRAM

## 2025-04-01 PROCEDURE — 3077F SYST BP >= 140 MM HG: CPT | Performed by: STUDENT IN AN ORGANIZED HEALTH CARE EDUCATION/TRAINING PROGRAM

## 2025-04-01 PROCEDURE — 3008F BODY MASS INDEX DOCD: CPT | Performed by: STUDENT IN AN ORGANIZED HEALTH CARE EDUCATION/TRAINING PROGRAM

## 2025-04-01 PROCEDURE — 99213 OFFICE O/P EST LOW 20 MIN: CPT | Performed by: STUDENT IN AN ORGANIZED HEALTH CARE EDUCATION/TRAINING PROGRAM

## 2025-04-01 RX ORDER — BLOOD-GLUCOSE SENSOR
EACH MISCELLANEOUS
Qty: 2 EACH | Refills: 11 | Status: SHIPPED | OUTPATIENT
Start: 2025-04-01

## 2025-04-01 NOTE — PATIENT INSTRUCTIONS
Thank you for your visit today. You were seen by Dr. Clark for diabetic neuropathy. If you have any questions or need to reach me, call my office at [phone].    We discussed the following plan today:   Continue pregabalin 200 mg twice a day  Return in 12 months or if your primary doctor is comfortable taking over the prescription you can return as needed

## 2025-04-01 NOTE — PROGRESS NOTES
"Subjective     Wan Dunlap is a right handed  64 y.o. year old male who presents with Diabetic polyneuropathy associated with type 2 diabetes arminda.   Visit type: follow up visit     His numbness in feet has remained unchanged since previous visit. Pins in his feet has improved with medication. Distribution of numbness is unchanged, no balance problems. His last A1c was 7.8 4 weeks ago.    He is taking pregabalin 200 mg BID which helps partly, no Aes. When he misses a pill he will notice worsening paresthesia.    Prior HX  Numbness of his right big toe started 5 years ago, then within a year began to involve left toe, and more recently has slowly spread to bilateral distal feet. He feels both dorsums feel cold all the time. He has pins/needles in the same distribution, as well as sharp pain. No walking, bowel or bladder issues, or focal weakness.    He has been diabetic for > 6 years. He drinks 6 beers per week. 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 He owns a garden center.     Takes gabapentin 300 mg QID - helps partly, no side effects      Patient Active Problem List   Diagnosis    Type 2 diabetes mellitus without complication, with long-term current use of insulin    Primary hypertension    Other hyperlipidemia    Erectile dysfunction    Diabetic neuropathy associated with type 2 diabetes mellitus    Sciatica of right side      Past Medical History:   Diagnosis Date    Diabetes mellitus (Multi)     Hypertension       Past Surgical History:   Procedure Laterality Date    ACHILLES TENDON SURGERY      KNEE SURGERY      TONSILLECTOMY      WRIST SURGERY        Social History     Socioeconomic History    Marital status:      Spouse name: Not on file    Number of children: Not on file    Years of education: Not on file    Highest education level: Not on file   Occupational History    Not on file   Tobacco Use    Smoking status: Never    Smokeless tobacco: Never   Vaping Use    Vaping status: " Never Used   Substance and Sexual Activity    Alcohol use: Yes     Alcohol/week: 6.0 standard drinks of alcohol     Types: 6 Cans of beer per week     Comment: Occassionally    Drug use: Not Currently     Types: Marijuana    Sexual activity: Defer   Other Topics Concern    Not on file   Social History Narrative    Not on file     Social Drivers of Health     Financial Resource Strain: Low Risk  (3/8/2023)    [facility]    Overall Financial Resource Strain (CARDIA)     Difficulty of Paying Living Expenses: Not hard at all   Food Insecurity: No Food Insecurity (3/8/2023)    [facility]    Hunger Vital Sign     Worried About Running Out of Food in the Last Year: Never true     Ran Out of Food in the Last Year: Never true   Transportation Needs: Unknown (3/8/2023)    [facility]    PRAPARE - Transportation     Lack of Transportation (Medical): Not on file     Lack of Transportation (Non-Medical): No   Physical Activity: Not on file   Stress: Not on file   Social Connections: Not on file   Intimate Partner Violence: Not on file   Housing Stability: Unknown (3/8/2023)    [facility]    Housing Stability Vital Sign     Unable to Pay for Housing in the Last Year: Not on file     Number of Places Lived in the Last Year: Not on file     Unstable Housing in the Last Year: No      Family History   Problem Relation Name Age of Onset    Heart attack Mother      Cataracts Father      Heart attack Brother        Patient Health Questionnaire-2 Score: 0          Review of Systems  All other system have been reviewed and are negative for complaint.    Vitals:    04/01/25 0936   BP: 144/81   BP Location: Right arm   Patient Position: Sitting   BP Cuff  "Size: Adult   Pulse: 64   Weight: 86 kg (189 lb 9.6 oz)   Height: 1.88 m (6' 2\")     Objective   Neurological Exam  Mental Status  Awake, alert and oriented to person, place and time. Speech is normal. Language is fluent with no aphasia. Attention and concentration are normal.    Cranial Nerves  CN II: Visual fields full to confrontation.  CN III, IV, VI: Extraocular movements intact bilaterally. Normal saccades. Normal smooth pursuit. Normal lids and orbits bilaterally. Pupils equal round and reactive to light bilaterally.  CN V:  Right: Facial sensation is normal.  Left: Facial sensation is normal on the left.  CN VII: Full and symmetric facial movement.  CN VIII: Hearing is normal.  CN IX, X: Palate elevates symmetrically  CN XI: Shoulder shrug strength is normal.  CN XII: Tongue midline without atrophy or fasciculations.    Motor  Normal muscle bulk throughout. No fasciculations present. Normal muscle tone. No abnormal involuntary movements.                                               Right                     Left   Shoulder abduction               5                          5  Elbow flexion                         5                          5  Elbow extension                    5                          5  Finger flexion                         5                          5  Finger extension                    5                          5  Finger abduction                    5                          5  Hip flexion                              5                          5  Knee flexion                           5                          5  Plantarflexion                         5                          5  Dorsiflexion                            5                          5    Sensory  Reduced pinprick over distal feet. Moderately reduced vibration below ankles.     Reflexes                                            Right                      Left  Biceps                                 2+                "          2+  Triceps                                2+                         2+  Patellar                                2+                         2+  Achilles                                0                         0  Right Plantar: downgoing  Left Plantar: downgoing    Right pathological reflexes: Allen's absent.  Left pathological reflexes: Allen's absent.    Coordination  Right: Finger-to-nose normal. Heel-to-shin normal.Left: Finger-to-nose normal. Heel-to-shin normal.    Gait  Casual gait is normal including stance, stride, and arm swing. Normal tandem gait. Romberg is absent.                          POC HEMOGLOBIN A1c   Date Value Ref Range Status   03/04/2025 7.8 (A) 4.2 - 6.5 % Final           Assessment/Plan   Wan Dunlap is a 64 y.o. year old male with DM2 since 2018, HTN, HLD, here for evaluation of distal numbness and painful paresthesia likely related to mild diabetic polyneuropathy. It seems to be slowly progressive to stable over time. Discussed diet, exercise, and medication strategies for better glucose control.  We discussed the following plan today:   Continue pregabalin 200 mg twice a day  Return in 12 months or if your primary doctor is comfortable taking over the prescription you can return as needed

## 2025-04-01 NOTE — PROGRESS NOTES
Requesting [facility] refill after successful trial with sample. Rx sent; patient to call PRN need.

## 2025-04-07 DIAGNOSIS — E11.9 TYPE 2 DIABETES MELLITUS WITHOUT COMPLICATION, WITH LONG-TERM CURRENT USE OF INSULIN: Primary | ICD-10-CM

## 2025-04-07 DIAGNOSIS — Z79.4 TYPE 2 DIABETES MELLITUS WITHOUT COMPLICATION, WITH LONG-TERM CURRENT USE OF INSULIN: Primary | ICD-10-CM

## 2025-04-08 ENCOUNTER — TELEMEDICINE (OUTPATIENT)
Dept: PHARMACY | Facility: HOSPITAL | Age: 65
End: 2025-04-08
Payer: MEDICARE

## 2025-04-08 ENCOUNTER — APPOINTMENT (OUTPATIENT)
Dept: CARDIOLOGY | Facility: CLINIC | Age: 65
End: 2025-04-08
Payer: MEDICARE

## 2025-04-08 VITALS
DIASTOLIC BLOOD PRESSURE: 64 MMHG | HEIGHT: 74 IN | HEART RATE: 72 BPM | SYSTOLIC BLOOD PRESSURE: 110 MMHG | WEIGHT: 188 LBS | BODY MASS INDEX: 24.13 KG/M2

## 2025-04-08 DIAGNOSIS — I10 PRIMARY HYPERTENSION: ICD-10-CM

## 2025-04-08 DIAGNOSIS — Z79.4 TYPE 2 DIABETES MELLITUS WITHOUT COMPLICATION, WITH LONG-TERM CURRENT USE OF INSULIN: ICD-10-CM

## 2025-04-08 DIAGNOSIS — R94.31 ABNORMAL EKG: Primary | ICD-10-CM

## 2025-04-08 DIAGNOSIS — E11.9 TYPE 2 DIABETES MELLITUS WITHOUT COMPLICATION, WITH LONG-TERM CURRENT USE OF INSULIN: ICD-10-CM

## 2025-04-08 DIAGNOSIS — E78.1 HYPERTRIGLYCERIDEMIA: ICD-10-CM

## 2025-04-08 PROCEDURE — G2211 COMPLEX E/M VISIT ADD ON: HCPCS | Performed by: INTERNAL MEDICINE

## 2025-04-08 PROCEDURE — 3074F SYST BP LT 130 MM HG: CPT | Performed by: INTERNAL MEDICINE

## 2025-04-08 PROCEDURE — 3051F HG A1C>EQUAL 7.0%<8.0%: CPT | Performed by: INTERNAL MEDICINE

## 2025-04-08 PROCEDURE — 1036F TOBACCO NON-USER: CPT | Performed by: INTERNAL MEDICINE

## 2025-04-08 PROCEDURE — 4010F ACE/ARB THERAPY RXD/TAKEN: CPT | Performed by: INTERNAL MEDICINE

## 2025-04-08 PROCEDURE — 3078F DIAST BP <80 MM HG: CPT | Performed by: INTERNAL MEDICINE

## 2025-04-08 PROCEDURE — 93000 ELECTROCARDIOGRAM COMPLETE: CPT | Performed by: INTERNAL MEDICINE

## 2025-04-08 PROCEDURE — 99204 OFFICE O/P NEW MOD 45 MIN: CPT | Performed by: INTERNAL MEDICINE

## 2025-04-08 PROCEDURE — 3008F BODY MASS INDEX DOCD: CPT | Performed by: INTERNAL MEDICINE

## 2025-04-08 RX ORDER — TIRZEPATIDE 5 MG/.5ML
5 INJECTION, SOLUTION SUBCUTANEOUS
Qty: 2 ML | Refills: 2 | Status: SHIPPED | OUTPATIENT
Start: 2025-04-08

## 2025-04-08 RX ORDER — FENOFIBRATE 48 MG/1
48 TABLET, FILM COATED ORAL DAILY
Qty: 30 TABLET | Refills: 11 | Status: SHIPPED | OUTPATIENT
Start: 2025-04-08 | End: 2026-04-08

## 2025-04-08 NOTE — PATIENT INSTRUCTIONS
"It was my pleasure to meet you.  I look forward to being your cardiologist.  I am a huge believer in communicating with my patients.  Please contact me at any time, if anything is not clear to you regarding anything we have discussed, or if new questions occur to you.     You should increase your intake of fresh fruits and vegetables.  Try to consume 9-12 servings per day of such foods.  You should increase your intake of deep sea fish such as salmon and tuna.  Try to get two servings per week of fish, but if you are a pregnant woman, talk to your obstetrician before increasing your fish intake.  You should increase your intake of unprocessed nuts such as walnuts or almonds.  Increase your intake of plant-based protein.  You should avoid fried foods.  Don't consume sugary or starchy foods and sugary drinks.  Avoid saturated fats.  Try not to dine at restaurants more than once per month, and don't dine at fast food places.  Try to get 7-9 hours of sleep every night.  Try to get 150 minutes per week of moderate intensity exercise (after I have cleared you to start an exercise program).  Try to maintain the appropriate weight for your height based on body mass index (BMI). Maintain your cholesterol, blood sugar, and blood pressure in the recommended respective normal ranges.  There is a wealth of information on the American Heart Association's website regarding this.  Just Google \"Life's Essential 8\" for more information.   Ask me about any of these details  if you have questions.    As your cardiologist, I will be available to you at any time to answer any question you have concerning your heart health.  My staff, Ophelia can also answer any questions you may have.  Best of luck.       It is important for us to have an accurate list of the medications, supplements, and their doses.  It is also important for us to have an accurate list of your allergies.  Please bring this information to every appointment.  " This is a vital part of the quality of care you receive through all of your providers.

## 2025-04-08 NOTE — PATIENT INSTRUCTIONS
Eating tips to reduce nausea and unwanted GI side effects from Ozempic and Mounjaro   Eat slowly  Eat smaller portions  Avoid laying down right after meal  Stop eating when feeling full  Avoid drinking from straw  Stay hydrated, drink small amounts of water throughout the day  Avoid strenuous exercise after eating  Avoid eating close too bedtime    Meal planning tips  Avoid fried foods, and high fat meals  Focus on bland foods  Choose water rich foods like soups, kefir, protein drinks/smoothies     Lifestyle suggestions  Keep a food/symptom diary, as it may be helpful to identify meal timing of foods that make nausea worse.  Engage in light exercise (walking), get fresh air    Additional tips for alleviating nausea:  Wait at least 30 minutes after GLP1-RA dose to eat, if feeling nauseated try crackers, apples, mint, debbie root or debbie tea  Avoid strong smells    If vomiting:  Eat smaller amounts of food in more frequent meals  Stay hydrated, but avoid drinks during meals, wait 30-60 minutes before and after meals to have liquids    If having diarrhea:  Generous hydration, i.e: water with lemon and tsp baking soda  Avoid dairy products, laxatives, coffee, alcoholic beverages, soft drinks, very cold or very hot foods, products that contain sugar alcohols (sorbitol, mannitol, xylitol, maltitol), including gum and candy  Avoid (or temporarily reduce your intake of) foods with high fiber content such as grain and seed products, whole grain breads, artichokes, asparagus, beans, cabbage, lentils, mushrooms, onions, garlic, sugar snap peas, skinned fruits, apples, apricots, blackberries, cherries, didier, nectarine, pears, plum  Eat chicken broth, rice, carrots, very ripe fruit without skin  Gradually increase fiber back in when symptoms improve    If having constipation:  Ensure the amount of fiber in diet is adequate (goal is minimum of 25 grams per day).  Increase physical activity  Drink generous amounts of  water

## 2025-04-08 NOTE — ASSESSMENT & PLAN NOTE
I discussed with the patient the potential interaction between statins and fibrates.  Benefits exceed the potential risk.    Risk factor modification: educational materials were provided to the patient.     Orders:    fenofibrate (Tricor) 48 mg tablet; Take 1 tablet (48 mg) by mouth once daily.    Follow Up In Cardiology; Future

## 2025-04-08 NOTE — ASSESSMENT & PLAN NOTE
HTN:  BP is well controlled.   We discussed sodium restriction, lifestyle modification, and the DASH diet.  I advised the patient to check BPs at home daily, and to contact me with an update in one month.    Risk factor modification: educational materials were provided to the patient.     Orders:    ECG 12 lead (Clinic Performed)    CT cardiac scoring wo IV contrast; Future    Follow Up In Cardiology; Future

## 2025-04-08 NOTE — ASSESSMENT & PLAN NOTE
Being a diabetic, he is at potential risk for silent ischemia.  Orders:    Referral to Cardiology    Follow Up In Cardiology; Future

## 2025-04-08 NOTE — PROGRESS NOTES
Referred by Dr. Samson for Please see below.     History Of Present Illness:    Wan Dunlap is a 64 y.o. male presenting with hypertension, hyperlipidemia.    I am seeing this 64 year old hypertensive, diabetic, hypertriglyceridemic man with a strong family history of premature CAD at the request of Dr. Samson due to cardiac risk factors    The patient used to see Dr. Mcmullen due to episodic dyspnea.  He recalls having an exercise stress imaging study approximately 2015, which he was told was normal.  The patient denies chest discomfort, dyspnea, palpitations, orthopnea, PND, syncope, and near syncope.  He does not exercise regularly.  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  He describes no limitation in walking on level ground.  He can climb up 4-5 flights of stairs without cardiac symptoms.        PMH: diabetic neuropathy; nephrolithiasis, erectile dysfunction      Past Medical History:  He has a past medical history of Diabetes mellitus (Multi) and Hypertension.    Past Surgical History:  He has a past surgical history that includes Knee surgery; Tonsillectomy; Achilles tendon surgery; and Wrist surgery.      Social History:  He reports that he has never smoked. He has never used smokeless tobacco. He reports current alcohol use of about 6.0 standard drinks of alcohol per week. He reports that he does not currently use drugs after having used the following drugs: Marijuana.    Family History:  Family History   Problem Relation Name Age of Onset    Heart attack Mother      Cataracts Father      Heart attack Brother          Allergies:  Patient has no known allergies.    Outpatient Medications:  Current Outpatient Medications   Medication Instructions    amLODIPine (NORVASC) 2.5 mg, oral, Daily    atorvastatin (LIPITOR) 20 mg, oral, Daily    blood-glucose sensor (FreeStyle Surjit 3 Plus Sensor) device  "Replace sensor every 15 days as directed    glipiZIDE (Glucotrol) 5 mg tablet 1 tablet, Daily (0630)    lisinopril 20 mg, oral, Daily    Mounjaro 5 mg, subcutaneous, Once Weekly    multivitamin tablet 1 tablet, Daily    omega 3-dha-epa-fish oil (Fish OiL) 360-1,200 mg capsule Take by mouth.    omeprazole (PriLOSEC) 40 mg DR capsule 1 capsule, Daily (0630)    potassium chloride CR 20 mEq ER tablet 20 mEq, oral, Daily    pregabalin (LYRICA) 200 mg, oral, 2 times daily    sildenafil (VIAGRA) 100 mg, oral, As needed    tadalafil (CIALIS) 5 mg, oral, Daily    tadalafil 20 mg, oral, Every 48 hours PRN    tiZANidine (ZANAFLEX) 4 mg, oral, Every 6 hours PRN        Last Recorded Vitals:  Vitals:    04/08/25 0900   BP: 110/64   BP Location: Right arm   Patient Position: Sitting   Pulse: 72   Weight: 85.3 kg (188 lb)   Height: 1.88 m (6' 2\")       Physical Exam:  GENERAL:  pleasant 64 year-old  HEENT: No xanthelasma  NECK: Supple, no palpable adenopathy or thyromegaly  CHEST: Clear to auscultation, respiratory effort unlabored  CARDIAC: RRR, normal S1 and S2, no audible murmur, rub, gallop, carotids are brisk, PMI is not displaced  ABD: Active bowel sounds, nontender, no organomegaly, no evidence of ascites  EXT: No clubbing, cyanosis, edema, or tenderness  NEURO: Awake, alert, appropriate, speech is fluent         Last Labs:  CBC -  Lab Results   Component Value Date    WBC 7.3 03/06/2025    HGB 16.1 03/06/2025    HCT 47.0 03/06/2025    MCV 88.0 03/06/2025     03/06/2025       CMP -  Lab Results   Component Value Date    CALCIUM 9.5 03/06/2025    PROT 7.5 03/06/2025    PROT 7.3 03/06/2025    ALBUMIN 4.8 03/06/2025    AST 17 03/06/2025    ALT 22 03/06/2025    ALKPHOS 79 03/06/2025    BILITOT 1.3 (H) 03/06/2025       LIPID PANEL -   Lab Results   Component Value Date    CHOL 131 03/06/2025    TRIG 351 (H) 03/06/2025    HDL 38 (L) 03/06/2025    CHHDL 3.4 03/06/2025    NHDL 93 03/06/2025       RENAL FUNCTION PANEL -   Lab " Results   Component Value Date    GLUCOSE 218 (H) 03/06/2025     03/06/2025    K 4.5 03/06/2025     03/06/2025    CO2 26 03/06/2025    ANIONGAP 8 03/06/2025    BUN 20 03/06/2025    CREATININE 0.88 03/06/2025    CALCIUM 9.5 03/06/2025    ALBUMIN 4.8 03/06/2025        Lab Results   Component Value Date    HGBA1C 7.8 (A) 03/04/2025         Lab review: I have Chemistry CMP:   Lab Results   Component Value Date    ALBUMIN 4.8 03/06/2025    CALCIUM 9.5 03/06/2025    CO2 26 03/06/2025    CREATININE 0.88 03/06/2025    GLUCOSE 218 (H) 03/06/2025    BILITOT 1.3 (H) 03/06/2025    PROT 7.5 03/06/2025    PROT 7.3 03/06/2025    ALT 22 03/06/2025    AST 17 03/06/2025    ALKPHOS 79 03/06/2025   , Chemistry BMP   Lab Results   Component Value Date    GLUCOSE 218 (H) 03/06/2025    CALCIUM 9.5 03/06/2025    CO2 26 03/06/2025    CREATININE 0.88 03/06/2025   , CBC:  Lab Results   Component Value Date    WBC 7.3 03/06/2025    RBC 5.34 03/06/2025    HGB 16.1 03/06/2025    HCT 47.0 03/06/2025    MCV 88.0 03/06/2025    MCH 30.1 03/06/2025    MCHC 34.3 03/06/2025    RDW 12.9 03/06/2025    MPV 12.3 03/06/2025   , and Lipids:   Lab Results   Component Value Date    CHOL 131 03/06/2025    HDL 38 (L) 03/06/2025    LDLCALC 56 03/06/2025    TRIG 351 (H) 03/06/2025       Assessment/Plan   Assessment & Plan  Type 2 diabetes mellitus without complication, with long-term current use of insulin  Being a diabetic, he is at potential risk for silent ischemia.  Orders:    Referral to Cardiology    Follow Up In Cardiology; Future    Primary hypertension  HTN:  BP is well controlled.   We discussed sodium restriction, lifestyle modification, and the DASH diet.  I advised the patient to check BPs at home daily, and to contact me with an update in one month.    Risk factor modification: educational materials were provided to the patient.     Orders:    ECG 12 lead (Clinic Performed)    CT cardiac scoring wo IV contrast; Future    Follow Up In  Cardiology; Future    Abnormal EKG    Orders:    Echocardiogram Stress Test; Future    Follow Up In Cardiology; Future    Hypertriglyceridemia  I discussed with the patient the potential interaction between statins and fibrates.  Benefits exceed the potential risk.    Risk factor modification: educational materials were provided to the patient.     Orders:    fenofibrate (Tricor) 48 mg tablet; Take 1 tablet (48 mg) by mouth once daily.    Follow Up In Cardiology; Future            Serg Andres MD

## 2025-04-08 NOTE — PROGRESS NOTES
"Subjective   Patient ID: Wan Dunlap is a 64 y.o. male who presents for Follow-up.    Requesting early follow-up to get refill on Mounjaro in which he has been out of for nearly 3 weeks. Previously stable on Mounjaro 5 mg weekly. No issues with toleration with any mounjaro doses thus far.       Assessment/Plan   Problem List Items Addressed This Visit       Type 2 diabetes mellitus without complication, with long-term current use of insulin    Relevant Medications    tirzepatide (Mounjaro) 5 mg/0.5 mL pen injector    Other Relevant Orders    Referral to Clinical Pharmacy       LAB RESULTS:  Lab Results   Component Value Date    HGBA1C 7.8 (A) 03/04/2025    HGBA1C 9.3 (H) 09/13/2023    HGBA1C 8.8 (H) 03/08/2023     Lab Results   Component Value Date    LDLCALC 56 03/06/2025    CREATININE 0.88 03/06/2025      Lab Results   Component Value Date    CHOL 131 03/06/2025     Lab Results   Component Value Date    HDL 38 (L) 03/06/2025       Lab Results   Component Value Date    LDLCALC 56 03/06/2025     Lab Results   Component Value Date    TRIG 351 (H) 03/06/2025     No components found for: \"CHOLHDL\"          Lab Results   Component Value Date    [id number] 03/06/2025       Continue all meds under the continuation of care with the referring provider and clinical pharmacy team.    Provided refill for mounjaro 5 mg to prevent disruption in therapy; patient transitioning between endo providers and will continue to follow patient until transition made. Follow-up 3 months. 7/1/25 10:20 am    "

## 2025-04-17 DIAGNOSIS — I10 PRIMARY HYPERTENSION: ICD-10-CM

## 2025-04-17 RX ORDER — ATORVASTATIN CALCIUM 20 MG/1
20 TABLET, FILM COATED ORAL DAILY
Qty: 90 TABLET | Refills: 3 | Status: SHIPPED | OUTPATIENT
Start: 2025-04-17

## 2025-04-23 ENCOUNTER — APPOINTMENT (OUTPATIENT)
Dept: CARDIOLOGY | Facility: HOSPITAL | Age: 65
End: 2025-04-23
Payer: MEDICARE

## 2025-05-01 ENCOUNTER — APPOINTMENT (OUTPATIENT)
Dept: UROLOGY | Facility: CLINIC | Age: 65
End: 2025-05-01
Payer: MEDICARE

## 2025-05-01 VITALS — HEART RATE: 77 BPM | DIASTOLIC BLOOD PRESSURE: 80 MMHG | SYSTOLIC BLOOD PRESSURE: 129 MMHG

## 2025-05-01 DIAGNOSIS — N52.9 ERECTILE DYSFUNCTION, UNSPECIFIED ERECTILE DYSFUNCTION TYPE: ICD-10-CM

## 2025-05-01 PROCEDURE — 3079F DIAST BP 80-89 MM HG: CPT | Performed by: NURSE PRACTITIONER

## 2025-05-01 PROCEDURE — 3051F HG A1C>EQUAL 7.0%<8.0%: CPT | Performed by: NURSE PRACTITIONER

## 2025-05-01 PROCEDURE — 4010F ACE/ARB THERAPY RXD/TAKEN: CPT | Performed by: NURSE PRACTITIONER

## 2025-05-01 PROCEDURE — 99213 OFFICE O/P EST LOW 20 MIN: CPT | Performed by: NURSE PRACTITIONER

## 2025-05-01 PROCEDURE — 3074F SYST BP LT 130 MM HG: CPT | Performed by: NURSE PRACTITIONER

## 2025-05-01 NOTE — PROGRESS NOTES
Subjective   Patient ID: Wan Dunlap is a 64 y.o. male who presents for No chief complaint on file..  Patient presents with erectile dysfunction.  Hx of diabetic with neuropathy and HTN (on BP pills).  Patient has tried both Viagra and Cialis, he felt the Viagra worked better but states he wasn't taking the Cialis correctly.  Developed severe ED which has been unresponsive to tadalafil tadalafil with addition of 20mg every other day.     His erections don't last through intercourse.  Rare spontaneous erections.  He doesn't always make it to orgasm.  No painful erections.  No curvature.  He's  and his wife is very supportive.  No nitroglycerin use.  No smoking hx.     No daytime frequency.  Nocturia 1-2x.  No urgency or incontinence.  Fair stream.  No straining or bearing down to initiate stream.  Hx of kidney stones, has always passed stones on his own.  Has not had issues with stone in many years.  No recurrent UTIs.  No dysuria.  No gross hematuria.  Father had prostate cancer.  Patient's PSA = 0.43.    Presents today to discuss trimix.                 Review of Systems   All other systems reviewed and are negative.      Objective   Physical Exam  Vitals reviewed.     Alert and oriented x3  Moist mucous membranes  Breathes easily on room air  Abdomen soft, nondistended.   No edema  No scleral icterus  No focal neurological deficits  Appears stated age, no acute distress      Assessment/Plan   Diagnoses and all orders for this visit:  Erectile dysfunction, unspecified erectile dysfunction type  -     Referral to Urology    Plan to utilize trimix 20/30/2. Given written info. Will return for teaching visit in a few weeks. Patient is in agreement with plan.          Tahmina Borrego, RASHEEDA-CNP 05/01/25 2:34 PM

## 2025-05-04 DIAGNOSIS — I10 PRIMARY HYPERTENSION: ICD-10-CM

## 2025-05-05 RX ORDER — POTASSIUM CHLORIDE 20 MEQ/1
20 TABLET, EXTENDED RELEASE ORAL DAILY
Qty: 90 TABLET | Refills: 1 | Status: SHIPPED | OUTPATIENT
Start: 2025-05-05

## 2025-05-20 ENCOUNTER — APPOINTMENT (OUTPATIENT)
Dept: UROLOGY | Facility: CLINIC | Age: 65
End: 2025-05-20
Payer: MEDICARE

## 2025-05-20 VITALS — SYSTOLIC BLOOD PRESSURE: 98 MMHG | HEART RATE: 65 BPM | DIASTOLIC BLOOD PRESSURE: 63 MMHG | TEMPERATURE: 97.3 F

## 2025-05-20 DIAGNOSIS — N52.9 ERECTILE DYSFUNCTION, UNSPECIFIED ERECTILE DYSFUNCTION TYPE: ICD-10-CM

## 2025-05-20 PROCEDURE — 4010F ACE/ARB THERAPY RXD/TAKEN: CPT | Performed by: NURSE PRACTITIONER

## 2025-05-20 PROCEDURE — 3074F SYST BP LT 130 MM HG: CPT | Performed by: NURSE PRACTITIONER

## 2025-05-20 PROCEDURE — 99213 OFFICE O/P EST LOW 20 MIN: CPT | Performed by: NURSE PRACTITIONER

## 2025-05-20 PROCEDURE — 3078F DIAST BP <80 MM HG: CPT | Performed by: NURSE PRACTITIONER

## 2025-05-20 PROCEDURE — 3051F HG A1C>EQUAL 7.0%<8.0%: CPT | Performed by: NURSE PRACTITIONER

## 2025-05-20 RX ORDER — TADALAFIL 5 MG/1
5 TABLET ORAL DAILY
Qty: 90 TABLET | Refills: 3 | Status: SHIPPED | OUTPATIENT
Start: 2025-05-20 | End: 2026-05-15

## 2025-05-20 ASSESSMENT — ENCOUNTER SYMPTOMS
LOSS OF SENSATION IN FEET: 0
OCCASIONAL FEELINGS OF UNSTEADINESS: 0
DEPRESSION: 0

## 2025-05-20 ASSESSMENT — PATIENT HEALTH QUESTIONNAIRE - PHQ9
2. FEELING DOWN, DEPRESSED OR HOPELESS: NOT AT ALL
1. LITTLE INTEREST OR PLEASURE IN DOING THINGS: NOT AT ALL
SUM OF ALL RESPONSES TO PHQ9 QUESTIONS 1 AND 2: 0

## 2025-05-20 NOTE — PROGRESS NOTES
Subjective   Patient ID: Wan Dunlap is a 65 y.o. male who presents for No chief complaint on file..  Patient presents with erectile dysfunction.  Hx of diabetic with neuropathy and HTN (on BP pills).  Patient has tried both Viagra and Cialis, he felt the Viagra worked better but states he wasn't taking the Cialis correctly.  Developed severe ED which has been unresponsive to tadalafil tadalafil with addition of 20mg every other day.      His erections don't last through intercourse.  Rare spontaneous erections.  He doesn't always make it to orgasm.  No painful erections.  No curvature.  He's  and his wife is very supportive.  No nitroglycerin use.  No smoking hx.     No daytime frequency.  Nocturia 1-2x.  No urgency or incontinence.  Fair stream.  No straining or bearing down to initiate stream.  Hx of kidney stones, has always passed stones on his own.  Has not had issues with stone in many years.  No recurrent UTIs.  No dysuria.  No gross hematuria.  Father had prostate cancer.  Patient's PSA = 0.43.     Ordered trimix 20/30/2. Presents for teaching. Did not bring medication           Review of Systems   All other systems reviewed and are negative.      Objective   Physical Exam  Vitals reviewed.     Alert and oriented x3  Moist mucous membranes  Breathes easily on room air  Abdomen soft, nondistended  No edema  No scleral icterus  No focal neurological deficits  Appears stated age, no acute distress      Assessment/Plan   Diagnoses and all orders for this visit:  Erectile dysfunction, unspecified erectile dysfunction type  -     tadalafil (Cialis) 5 mg tablet; Take 1 tablet (5 mg) by mouth once daily.    Plan to continue on daily tadalafil to aid in response to trimix. Discussed appropriate titration. Patient is in agreement with plan. Will follow up in 3 months or sooner if needed. Patient verbalized understanding of plan         RASHEEDA Chung-CNP 05/20/25 2:08 PM

## 2025-05-20 NOTE — PATIENT INSTRUCTIONS
INJECTION TEACHING VISIT  Patient was able to verbalize using clean technique to draw up and administer trimix 10 units safely  He was able to identify correct locations to inject  He is aware of proper titration of drug to achieve erection with the ability to penetrate that lasts approximately one hour long. He is aware to increase the drug by 5-10 units with each injection until this is achieved. Max dosage 60 units  He is aware of possible side effects- including priapism. He may use Sudafed OTC if erection lasts longer than an hour and apply ice to the injection site  He is aware not to inject more often than every other day  He is aware that the medication must be kept cold in order to remain potent  If the erection lasts longer than 3-4 hours he will need to go to ER for management    Plan for f/u in 3 months. Patient verbalized understanding of plan.

## 2025-05-21 ENCOUNTER — OFFICE VISIT (OUTPATIENT)
Dept: ORTHOPEDIC SURGERY | Facility: CLINIC | Age: 65
End: 2025-05-21
Payer: MEDICARE

## 2025-05-21 DIAGNOSIS — S46.012A TRAUMATIC TEAR OF LEFT ROTATOR CUFF, UNSPECIFIED TEAR EXTENT, INITIAL ENCOUNTER: Primary | ICD-10-CM

## 2025-05-21 PROCEDURE — 99213 OFFICE O/P EST LOW 20 MIN: CPT | Performed by: STUDENT IN AN ORGANIZED HEALTH CARE EDUCATION/TRAINING PROGRAM

## 2025-05-21 PROCEDURE — 3051F HG A1C>EQUAL 7.0%<8.0%: CPT | Performed by: STUDENT IN AN ORGANIZED HEALTH CARE EDUCATION/TRAINING PROGRAM

## 2025-05-21 PROCEDURE — 4010F ACE/ARB THERAPY RXD/TAKEN: CPT | Performed by: STUDENT IN AN ORGANIZED HEALTH CARE EDUCATION/TRAINING PROGRAM

## 2025-05-21 ASSESSMENT — PAIN - FUNCTIONAL ASSESSMENT: PAIN_FUNCTIONAL_ASSESSMENT: 0-10

## 2025-05-21 ASSESSMENT — PAIN SCALES - GENERAL: PAINLEVEL_OUTOF10: 7

## 2025-05-22 DIAGNOSIS — E11.42 DIABETIC POLYNEUROPATHY ASSOCIATED WITH TYPE 2 DIABETES MELLITUS: ICD-10-CM

## 2025-05-22 RX ORDER — PREGABALIN 200 MG/1
200 CAPSULE ORAL 2 TIMES DAILY
Qty: 180 CAPSULE | Refills: 2 | Status: SHIPPED | OUTPATIENT
Start: 2025-05-22

## 2025-05-23 ENCOUNTER — TELEPHONE (OUTPATIENT)
Dept: NEUROLOGY | Facility: CLINIC | Age: 65
End: 2025-05-23
Payer: MEDICARE

## 2025-05-23 NOTE — TELEPHONE ENCOUNTER
PA approval for Pregabalin 200 mg faxed to [facility] at [phone].   Auth dates 4/23/25-5/23/26  PA # [id number]

## 2025-05-23 NOTE — TELEPHONE ENCOUNTER
Approved  Prior Authorization Portal   Prior authorization approved  Payer: [employer] Patient's Payer Case ID: [id number]    [phone]  Note from payer: Approved.  Approval Details    Authorized from April 23, 2025 to May 23, 2026  Electronic appeal: Not supported  Approval faxed to [facility].

## 2025-05-29 ENCOUNTER — APPOINTMENT (OUTPATIENT)
Dept: PRIMARY CARE | Facility: CLINIC | Age: 65
End: 2025-05-29
Payer: MEDICARE

## 2025-06-03 ENCOUNTER — HOSPITAL ENCOUNTER (OUTPATIENT)
Dept: CARDIOLOGY | Facility: HOSPITAL | Age: 65
Discharge: HOME | End: 2025-06-03
Payer: MEDICARE

## 2025-06-03 DIAGNOSIS — R94.31 ABNORMAL EKG: ICD-10-CM

## 2025-06-03 PROCEDURE — 93350 STRESS TTE ONLY: CPT | Performed by: INTERNAL MEDICINE

## 2025-06-03 PROCEDURE — 93016 CV STRESS TEST SUPVJ ONLY: CPT | Performed by: INTERNAL MEDICINE

## 2025-06-03 PROCEDURE — 93350 STRESS TTE ONLY: CPT

## 2025-06-03 PROCEDURE — 93018 CV STRESS TEST I&R ONLY: CPT | Performed by: INTERNAL MEDICINE

## 2025-06-04 ENCOUNTER — APPOINTMENT (OUTPATIENT)
Dept: CARDIOLOGY | Facility: HOSPITAL | Age: 65
End: 2025-06-04
Payer: MEDICARE

## 2025-06-05 ENCOUNTER — HOSPITAL ENCOUNTER (OUTPATIENT)
Dept: RADIOLOGY | Facility: CLINIC | Age: 65
Discharge: HOME | End: 2025-06-05
Payer: MEDICARE

## 2025-06-05 DIAGNOSIS — S46.012A TRAUMATIC TEAR OF LEFT ROTATOR CUFF, UNSPECIFIED TEAR EXTENT, INITIAL ENCOUNTER: ICD-10-CM

## 2025-06-05 PROCEDURE — 73221 MRI JOINT UPR EXTREM W/O DYE: CPT | Mod: LT

## 2025-06-06 ENCOUNTER — OFFICE VISIT (OUTPATIENT)
Dept: ORTHOPEDIC SURGERY | Facility: CLINIC | Age: 65
End: 2025-06-06
Payer: MEDICARE

## 2025-06-06 DIAGNOSIS — M75.21 BICEPS TENDINITIS ON RIGHT: ICD-10-CM

## 2025-06-06 DIAGNOSIS — M75.102 TEAR OF LEFT ROTATOR CUFF, UNSPECIFIED TEAR EXTENT, UNSPECIFIED WHETHER TRAUMATIC: Primary | ICD-10-CM

## 2025-06-06 PROBLEM — M75.122 COMPLETE ROTATOR CUFF TEAR OR RUPTURE OF LEFT SHOULDER, NOT SPECIFIED AS TRAUMATIC: Status: ACTIVE | Noted: 2025-06-17

## 2025-06-06 PROBLEM — M75.22 BICIPITAL TENDINITIS, LEFT SHOULDER: Status: ACTIVE | Noted: 2025-06-17

## 2025-06-06 NOTE — PROGRESS NOTES
Chief Complaint   Patient presents with    Left Shoulder - Follow-up     MRI REVIEW        HPI  64-year-old male presents today for evaluation of his left shoulder.  Patient was moving a heavy object 2023 felt a pop about his shoulder and is never been the same since.  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  Has been able to complete his job however been having worsening shoulder pain as of late.  Pain primary about the shoulder region does occasionally wake him up from sleep.  Last visit we did proceed with a cortisone injection to help him get through his busy season.  Patient states that he did experience about a month of relief however had insidious return of symptoms.  States he feels about where he was prior to cortisone injection.  Difficult time sleeping at night currently.    Presents today for discussion of MRI results        Past Medical History:   Diagnosis Date    Diabetes mellitus (Multi)     Hypertension        Past Surgical History:   Procedure Laterality Date    ACHILLES TENDON SURGERY      KNEE SURGERY      TONSILLECTOMY      WRIST SURGERY          No Known Allergies     Physical exam    General: Alert and oriented to place, person, and time.  No acute distress and breathing comfortably; pleasant and cooperative with the examination.  HEENT: Head is normocephalic and atraumatic.  Neck: Supple, no visible swelling.  Cardiovascular: Good perfusion to the affected extremity.  Lungs: No audible wheezing or labored breathing.  Abdomen: Nondistended  HEME/Lymph : No visible abnormalities bilateral lower extremity    Extremity:  Left shoulder:     Skin healthy to gross inspection  No ecchymosis, no swelling, no gross atrophy     No tenderness to palpation over acromioclavicular joint  No tenderness to palpation over biceps tendon  No tenderness to palpation over the cervical spine      ROM:  No significant decrease in forward flexion, internal or external rotation      Strength:  4/5  Resisted elevation  5/5 Resisted external rotation  Negative lift off test   Negative Spurling´s test  Positive Neer and Hawking´s test  Mild pain with Speed's test  Neurovascular exam normal distally    Diagnostics:  MR shoulder left wo IV contrast  Result Date: 6/5/2025  Interpreted By:  Junior Marin, STUDY: MR SHOULDER LEFT WO IV CONTRAST;   INDICATION: Signs/Symptoms:pain.   COMPARISON: March 10, 2025 plain film radiographs of the left shoulder.   ACCESSION NUMBER(S): [id number]   ORDERING CLINICIAN: GINO JEAN-BAPTISTE   TECHNIQUE: Multiplanar multisequential MRI left shoulder without contrast.   FINDINGS: Evaluation of the rotator cuff demonstrates full-thickness tearing of the distal supraspinatus tendon measuring approximately 2 cm in AP dimension. Slight retraction.   High-grade partial-thickness tearing superior fibers subscapularis.   Muscle volume normal.   Intra-articular long head biceps shows extensive intrasubstance tearing and tendinosis of the intra-articular portion. It also shows medial subluxation over the lesser tuberosity.   No glenohumeral chondral defects.   Blunted superior and posterosuperior labrum with degenerative tearing.   Moderate acromioclavicular osteoarthritis.   No glenohumeral ligamentous abnormality.   No evidence of fracture.   No marrow replacement lesion.   Significant subacromial subdeltoid bursal fluid in keeping with rotator cuff tear.       Full-thickness tear distal supraspinatus tendon as well as the superior fibers of the subscapularis.   Marked intrasubstance tearing and tendinosis of the biceps with medial subluxation.   Superior and posterosuperior degenerative labral tearing.   Moderate acromioclavicular osteoarthritis.   Signed by: Junior Marin 6/5/2025 4:53 PM Dictation workstation:   [id number]  Procedure:  Procedures    Assessment:  64-year-old male with large full-thickness rotator cuff tear    Treatment plan:  The natural history of the condition and its  associated treatment alternatives including surgical and nonsurgical options were discussed with the patient at length.  MRI reviewed my independent interpretation as follows there is a full-thickness tear of the supraspinatus tendon as well as the subscapularis tendon with medial subluxation of the biceps, labral fraying moderate AC joint arthritis.  Constellation of findings discussed with Wan discussed nature of full-thickness rotator cuff tear traumatic etiology.  Risk benefits alternative treatment discussed with patient detail using shared informed tissue making wish to proceed with diagnostic shoulder arthroscopy rotator cuff repair, biceps tenodesis, subacromial decompression.  Will obtain medical/cardiac clearance prior to proceeding.  Discussed that MRI he does have full-thickness subscapularis tendon tear given medial subluxation and supraspinatus tear as well.  Will proceed with rotator cuff repair of the subscap and the supraspinatus subacromial decompression open biceps tenodesis    Based on history and physical exam as well as imaging findings recommend surgical intervention to include left shoulder diagnostic arthoscopy, rotator cuff repair, open biceps tenodesis, extensive debridement, subacromial decompression.  Risk benefits and alternatives to treatment were discussed.  Particular risks of the surgery include but not limited to continued pain, weakness,  stiffness, neurovascular compromise, need for revision surgery.  Despite these risks, patient wishes to proceed. Postoperative restrictions and limitations were reviewed in detail.  Patient will schedule surgery at their earliest convenience.    The planned surgical procedure includes examination under anesthesia. Anesthetic risks will be discussed with the patient by the anesthetist. Arthroscopic evaluation will be performed of the shoulder joint.  Then an arthroscopic procedure will be performed which may include but not be limited to  debridement of structures in and around the shoulder joint, repair of rotator cuff or other indicated structures requiring repair.  Regarding the biceps tendon, I will evaluate this intraoperatively.  If there is significant fraying biceps tenosynovitis and visualized pathology not otherwise seen on MRI we will perform biceps tenodesis versus biceps tenotomy.    Pending intraoperative findings and therapeutic treatment patient may be required to wear a sling for 6 weeks duration.  This was discussed with the patient and they are willing to follow postoperative restrictions.  We discussed the use of physical therapy after this immobilization.  To aid in gaining return of motion and function to the operative extremity.     In addition, if there are advanced degenerative changes I have advised the patient that this may indeed be a progressive process, ultimately resulting in further pain at some point in the future.    The risks of surgery were discussed including but not limited to the risks of medications given for surgery, the risk of blood loss during and after surgery that can lead to the need for blood products in certain situations, infection, damage to normal structures that can lead to long term problems of pain or dysfunction, wound healing complications, the possibility of nonunion/malunion of any osteotomies and late or chronic pain as a result of the surgical intervention.  In addition potentially life threatening complications that can occur at the time of surgery and after surgery were discussed including but not limited to deep vein thrombosis, pulmonary embolism, myocardial infarction, stroke and death.    We will provide a prescription for Percocet 5/325 28  tabs the day prior to surgery.  The patient will pick this up today before surgery in anticipation for surgery/postoperative pain control.

## 2025-06-06 NOTE — H&P (VIEW-ONLY)
Chief Complaint   Patient presents with    Left Shoulder - Follow-up     MRI REVIEW        HPI  64-year-old male presents today for evaluation of his left shoulder.  Patient was moving a heavy object 2023 felt a pop about his shoulder and is never been the same since.  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  Has been able to complete his job however been having worsening shoulder pain as of late.  Pain primary about the shoulder region does occasionally wake him up from sleep.  Last visit we did proceed with a cortisone injection to help him get through his busy season.  Patient states that he did experience about a month of relief however had insidious return of symptoms.  States he feels about where he was prior to cortisone injection.  Difficult time sleeping at night currently.    Presents today for discussion of MRI results        Past Medical History:   Diagnosis Date    Diabetes mellitus (Multi)     Hypertension        Past Surgical History:   Procedure Laterality Date    ACHILLES TENDON SURGERY      KNEE SURGERY      TONSILLECTOMY      WRIST SURGERY          No Known Allergies     Physical exam    General: Alert and oriented to place, person, and time.  No acute distress and breathing comfortably; pleasant and cooperative with the examination.  HEENT: Head is normocephalic and atraumatic.  Neck: Supple, no visible swelling.  Cardiovascular: Good perfusion to the affected extremity.  Lungs: No audible wheezing or labored breathing.  Abdomen: Nondistended  HEME/Lymph : No visible abnormalities bilateral lower extremity    Extremity:  Left shoulder:     Skin healthy to gross inspection  No ecchymosis, no swelling, no gross atrophy     No tenderness to palpation over acromioclavicular joint  No tenderness to palpation over biceps tendon  No tenderness to palpation over the cervical spine      ROM:  No significant decrease in forward flexion, internal or external rotation      Strength:  4/5  Resisted elevation  5/5 Resisted external rotation  Negative lift off test   Negative Spurling´s test  Positive Neer and Hawking´s test  Mild pain with Speed's test  Neurovascular exam normal distally    Diagnostics:  MR shoulder left wo IV contrast  Result Date: 6/5/2025  Interpreted By:  Junior Marin, STUDY: MR SHOULDER LEFT WO IV CONTRAST;   INDICATION: Signs/Symptoms:pain.   COMPARISON: March 10, 2025 plain film radiographs of the left shoulder.   ACCESSION NUMBER(S): [id number]   ORDERING CLINICIAN: GINO JEAN-BAPTISTE   TECHNIQUE: Multiplanar multisequential MRI left shoulder without contrast.   FINDINGS: Evaluation of the rotator cuff demonstrates full-thickness tearing of the distal supraspinatus tendon measuring approximately 2 cm in AP dimension. Slight retraction.   High-grade partial-thickness tearing superior fibers subscapularis.   Muscle volume normal.   Intra-articular long head biceps shows extensive intrasubstance tearing and tendinosis of the intra-articular portion. It also shows medial subluxation over the lesser tuberosity.   No glenohumeral chondral defects.   Blunted superior and posterosuperior labrum with degenerative tearing.   Moderate acromioclavicular osteoarthritis.   No glenohumeral ligamentous abnormality.   No evidence of fracture.   No marrow replacement lesion.   Significant subacromial subdeltoid bursal fluid in keeping with rotator cuff tear.       Full-thickness tear distal supraspinatus tendon as well as the superior fibers of the subscapularis.   Marked intrasubstance tearing and tendinosis of the biceps with medial subluxation.   Superior and posterosuperior degenerative labral tearing.   Moderate acromioclavicular osteoarthritis.   Signed by: Junior Marin 6/5/2025 4:53 PM Dictation workstation:   [id number]  Procedure:  Procedures    Assessment:  64-year-old male with large full-thickness rotator cuff tear    Treatment plan:  The natural history of the condition and its  associated treatment alternatives including surgical and nonsurgical options were discussed with the patient at length.  MRI reviewed my independent interpretation as follows there is a full-thickness tear of the supraspinatus tendon as well as the subscapularis tendon with medial subluxation of the biceps, labral fraying moderate AC joint arthritis.  Constellation of findings discussed with Wan discussed nature of full-thickness rotator cuff tear traumatic etiology.  Risk benefits alternative treatment discussed with patient detail using shared informed tissue making wish to proceed with diagnostic shoulder arthroscopy rotator cuff repair, biceps tenodesis, subacromial decompression.  Will obtain medical/cardiac clearance prior to proceeding.  Discussed that MRI he does have full-thickness subscapularis tendon tear given medial subluxation and supraspinatus tear as well.  Will proceed with rotator cuff repair of the subscap and the supraspinatus subacromial decompression open biceps tenodesis    Based on history and physical exam as well as imaging findings recommend surgical intervention to include left shoulder diagnostic arthoscopy, rotator cuff repair, open biceps tenodesis, extensive debridement, subacromial decompression.  Risk benefits and alternatives to treatment were discussed.  Particular risks of the surgery include but not limited to continued pain, weakness,  stiffness, neurovascular compromise, need for revision surgery.  Despite these risks, patient wishes to proceed. Postoperative restrictions and limitations were reviewed in detail.  Patient will schedule surgery at their earliest convenience.    The planned surgical procedure includes examination under anesthesia. Anesthetic risks will be discussed with the patient by the anesthetist. Arthroscopic evaluation will be performed of the shoulder joint.  Then an arthroscopic procedure will be performed which may include but not be limited to  debridement of structures in and around the shoulder joint, repair of rotator cuff or other indicated structures requiring repair.  Regarding the biceps tendon, I will evaluate this intraoperatively.  If there is significant fraying biceps tenosynovitis and visualized pathology not otherwise seen on MRI we will perform biceps tenodesis versus biceps tenotomy.    Pending intraoperative findings and therapeutic treatment patient may be required to wear a sling for 6 weeks duration.  This was discussed with the patient and they are willing to follow postoperative restrictions.  We discussed the use of physical therapy after this immobilization.  To aid in gaining return of motion and function to the operative extremity.     In addition, if there are advanced degenerative changes I have advised the patient that this may indeed be a progressive process, ultimately resulting in further pain at some point in the future.    The risks of surgery were discussed including but not limited to the risks of medications given for surgery, the risk of blood loss during and after surgery that can lead to the need for blood products in certain situations, infection, damage to normal structures that can lead to long term problems of pain or dysfunction, wound healing complications, the possibility of nonunion/malunion of any osteotomies and late or chronic pain as a result of the surgical intervention.  In addition potentially life threatening complications that can occur at the time of surgery and after surgery were discussed including but not limited to deep vein thrombosis, pulmonary embolism, myocardial infarction, stroke and death.    We will provide a prescription for Percocet 5/325 28  tabs the day prior to surgery.  The patient will pick this up today before surgery in anticipation for surgery/postoperative pain control.

## 2025-06-07 ENCOUNTER — APPOINTMENT (OUTPATIENT)
Dept: RADIOLOGY | Facility: HOSPITAL | Age: 65
End: 2025-06-07
Payer: MEDICARE

## 2025-06-09 ENCOUNTER — LAB (OUTPATIENT)
Dept: LAB | Facility: HOSPITAL | Age: 65
End: 2025-06-09
Payer: MEDICARE

## 2025-06-09 ENCOUNTER — HOSPITAL ENCOUNTER (OUTPATIENT)
Dept: CARDIOLOGY | Facility: HOSPITAL | Age: 65
Discharge: HOME | End: 2025-06-09
Payer: MEDICARE

## 2025-06-09 DIAGNOSIS — Z01.818 PRE-OP EXAMINATION: ICD-10-CM

## 2025-06-09 LAB
ALBUMIN SERPL BCP-MCNC: 4.9 G/DL (ref 3.4–5)
ALP SERPL-CCNC: 56 U/L (ref 33–136)
ALT SERPL W P-5'-P-CCNC: 20 U/L (ref 10–52)
ANION GAP SERPL CALC-SCNC: 11 MMOL/L (ref 10–20)
APTT PPP: 30 SECONDS (ref 26–36)
AST SERPL W P-5'-P-CCNC: 17 U/L (ref 9–39)
BASOPHILS # BLD AUTO: 0.04 X10*3/UL (ref 0–0.1)
BASOPHILS NFR BLD AUTO: 0.6 %
BILIRUB SERPL-MCNC: 1.2 MG/DL (ref 0–1.2)
BUN SERPL-MCNC: 20 MG/DL (ref 6–23)
CALCIUM SERPL-MCNC: 9.7 MG/DL (ref 8.6–10.3)
CHLORIDE SERPL-SCNC: 108 MMOL/L (ref 98–107)
CO2 SERPL-SCNC: 26 MMOL/L (ref 21–32)
CREAT SERPL-MCNC: 1.18 MG/DL (ref 0.5–1.3)
EGFRCR SERPLBLD CKD-EPI 2021: 69 ML/MIN/1.73M*2
EOSINOPHIL # BLD AUTO: 0.1 X10*3/UL (ref 0–0.7)
EOSINOPHIL NFR BLD AUTO: 1.4 %
ERYTHROCYTE [DISTWIDTH] IN BLOOD BY AUTOMATED COUNT: 12.9 % (ref 11.5–14.5)
EST. AVERAGE GLUCOSE BLD GHB EST-MCNC: 134 MG/DL
GLUCOSE SERPL-MCNC: 229 MG/DL (ref 74–99)
HBA1C MFR BLD: 6.3 % (ref ?–5.7)
HCT VFR BLD AUTO: 45.7 % (ref 41–52)
HGB BLD-MCNC: 15.5 G/DL (ref 13.5–17.5)
IMM GRANULOCYTES # BLD AUTO: 0.03 X10*3/UL (ref 0–0.7)
IMM GRANULOCYTES NFR BLD AUTO: 0.4 % (ref 0–0.9)
INR PPP: 1 (ref 0.9–1.1)
LYMPHOCYTES # BLD AUTO: 2.11 X10*3/UL (ref 1.2–4.8)
LYMPHOCYTES NFR BLD AUTO: 30.2 %
MCH RBC QN AUTO: 30.2 PG (ref 26–34)
MCHC RBC AUTO-ENTMCNC: 33.9 G/DL (ref 32–36)
MCV RBC AUTO: 89 FL (ref 80–100)
MONOCYTES # BLD AUTO: 0.59 X10*3/UL (ref 0.1–1)
MONOCYTES NFR BLD AUTO: 8.4 %
NEUTROPHILS # BLD AUTO: 4.12 X10*3/UL (ref 1.2–7.7)
NEUTROPHILS NFR BLD AUTO: 59 %
NRBC BLD-RTO: 0 /100 WBCS (ref 0–0)
PLATELET # BLD AUTO: 157 X10*3/UL (ref 150–450)
POTASSIUM SERPL-SCNC: 4.5 MMOL/L (ref 3.5–5.3)
PROT SERPL-MCNC: 7.2 G/DL (ref 6.4–8.2)
PROTHROMBIN TIME: 11.4 SECONDS (ref 9.8–12.4)
RBC # BLD AUTO: 5.13 X10*6/UL (ref 4.5–5.9)
SODIUM SERPL-SCNC: 140 MMOL/L (ref 136–145)
WBC # BLD AUTO: 7 X10*3/UL (ref 4.4–11.3)

## 2025-06-09 PROCEDURE — 83036 HEMOGLOBIN GLYCOSYLATED A1C: CPT | Mod: ELYLAB

## 2025-06-09 PROCEDURE — 93005 ELECTROCARDIOGRAM TRACING: CPT

## 2025-06-09 PROCEDURE — 85730 THROMBOPLASTIN TIME PARTIAL: CPT

## 2025-06-09 PROCEDURE — 85610 PROTHROMBIN TIME: CPT

## 2025-06-09 PROCEDURE — 80053 COMPREHEN METABOLIC PANEL: CPT

## 2025-06-09 PROCEDURE — 36415 COLL VENOUS BLD VENIPUNCTURE: CPT

## 2025-06-09 PROCEDURE — 85025 COMPLETE CBC W/AUTO DIFF WBC: CPT

## 2025-06-09 NOTE — PREPROCEDURE INSTRUCTIONS
PRE-OPERATIVE INSTRUCTIONS     You will receive notification one business day prior to your surgery to confirm your arrival time and additional information between 2 P.M. - 5P.M. It is important that you answer your phone and/or check your messages during this time. Please arrive at your scheduled time to avoid delay or cancelled surgery.  You may see in [facility] your surgery start time change several times even up to the day before your procedure. Please disregard those times and only follow the time given by the  who will be notifying you via phone and not a text.     Please enter the building through the either the Main Entrance in front of the hospital or from the Parking Garage Walk [address] Bridge.  From the parking garage, which is free, take the 2nd Floor Walkway Bridge into the hospital and check in at the [facility] Outpatient Desk as you enter the hospital directly in front of you.  If you enter through the Main Entrance take the elevator off the lobby on the right labeled “A” to the 2nd floor and check in at the [facility] Outpatient Surgery desk as you exit the elevator. There is handicap parking, in land lot, in front of hospital by main entrance and where wheelchairs to be used if needed.     INSTRUCTIONS:   Please contact your surgeon's office about any changes in your health, such as bad cold, fever, sore throat, cold or COVID within last 4 weeks.   Talk to your surgeon for instructions if you should stop your aspirin, NSAIDS, blood thinners, or diabetes medicines (or prescribing doctor), multivitamins or over the counter supplements since many surgeons have you adjust or hold these medications prior to procedure.  In most instances, taking certain medications like beta blocker, blood pressure medications, seizure medication, and a couple others may need to be taken morning of procedure with a sip of water, but only as instructed by your physician. Please discuss with your doctor what medications you may take  the day of your procedure.    You can have 2 adults over the age of 18 to accompany you on the day of surgery.  Only one person may be allowed to go back into the pre-operative area with you at a time.  Visitors under the age of 18 may stay with an adult in the surgical waiting room, but are not permitted into the pre and post-operative areas.  The adult responsible for the minor while present cannot be the patient having surgery.  If not being admitted and you are possibly receiving any type of anesthesia with your procedure, you must have an adult (age 18 or older) immediately available to drive you home after surgery or your procedure will be cancelled. You may be discharged home after surgery with Uber, Lyft, Taxi or any other transportation service as long as “a responsible adult (18 or older) is in the vehicle with you at time of discharge. The  of these transportation services is not responsible for your care and cannot be consider as the responsible adult. We also highly recommend you have someone stay with you for the entire day and night of your surgery.     All jewelry and piercings must be removed. If you are unable to remove an item or have a dermal piercing, please be sure to tell the nurse when you arrive for surgery.  Nail polish (One finger off each hand), make-up or other beauty products (lotion, deodorant, hairspray, perfume, etc.) must be removed or not used for day of surgery. Do not wear contact lens to hospital.   Leave valuables at home except photo ID, insurance card and any co-payment that has been requested by hospital  Avoid smoking, consuming alcohol, or any medical or recreational drug use for 24 hours before surgery.  To help prevent infection, please take a shower/bath and wash your hair the night before and/or morning of surgery.  You can brush your teeth, just do not swallow any water.  For adults who are unable to consent or make medical decisions for themselves, a legal  guardian or Power of  must accompany them to the hospital. If this is not possible, please call [phone] to make additional arrangements.  If there is any guardianship or legal Power of  paperwork, please bring them the day of surgery.  Wear comfortable, loose fitting clothing.    Additional instructions about eating and drinking before surgery when on a GLP1, SGLT2, or certain weight loss medications:    You may be on one of the medications listed below and if so please contact your surgeon/procedure doctor to receive Holding or Stop instructions, if you have not already received:    Type of Medication Medication Name             G  L  P  1   Byetta (Exenatide IR)    Saxenda (liraglutide)    Victoza (liraglutide)    Adlyxin (lixisenatide)    Rybelsus (semaglutide)    Trulicity (dulaglutide)    Bydureon (exenatide ER)    Ozempic (semaglutide)    Mounjaro (Tirzepatide)    Zepbound (Tirzepatide)    Wegovy (semaglutide)    Tanzeum(Albiglutide)    Soliqua   S  G  L  T  2 Jardiance (empaglifozin)    Farxiga (dapagliflozin)    Invokana (canagliflozin)    Brenzavvy (bexagliflozin)    Steglatro (ertugliflozin)     Stimulant Adipex(Phentermine)    Qsymia (Phentermine-topiramate)     GI lipase inhibitor Xenical  Orlistat  Kamron    melanocortin agonist class Imcivree (setmelanotide)      On these types of medication and receiving anesthesia, food or liquid in your stomach can enter your lungs causing serious complications  GLP-1 medications can slow the movement of food through your stomach and intestines. This further increases the risk of food entering your lungs with anesthesia    Fasting Instructions:  To help ensure food has passed out of your stomach, you will START a clear liquid diet 24 hours before your surgery  CLEAR LIQUID Diet consists of: clear liquids and foods that melt into a clear liquid (i.e. gelatin) and excludes solid foods and liquids you cannot see through (i.e. milk). Clears can and  should contain sugar to obtain a sufficient number of calories.  Clear, fat-free broth  Clear nutritional drinks  Pulp-free popsicles, vegetable and fruit juice  Gelatin  Coffee and tea without creamer or milk  Clear soda and sports drinks  On the day of your surgery/procedure, STOP all clear liquids 6 hours before your arrival time to the hospital/facility    Diabetic patients clear liquids should not be sugar-free, and check your blood glucose as you normally do  If you have symptoms of low blood glucose (shakiness, sweating, dizziness, confusion) or high blood glucose (dry mouth, excessive thirst, frequent urination, blurry vision), check your blood glucose level   For low blood glucose increase your consumption of sugar-containing clear liquid   For high blood glucose, decrease your consumption of sugar-containing clears and treat as you normally would  If symptoms persist seek medical attention    If you have any questions or concerns, please call Pre-Admission Testing at [phone] or your physician Dr. Thomas Samson  [phone]  [facility] General Line: [phone]

## 2025-06-12 ENCOUNTER — APPOINTMENT (OUTPATIENT)
Dept: PRIMARY CARE | Facility: CLINIC | Age: 65
End: 2025-06-12
Payer: MEDICARE

## 2025-06-12 VITALS
HEIGHT: 74 IN | OXYGEN SATURATION: 97 % | HEART RATE: 62 BPM | BODY MASS INDEX: 23.74 KG/M2 | RESPIRATION RATE: 16 BRPM | DIASTOLIC BLOOD PRESSURE: 67 MMHG | WEIGHT: 185 LBS | TEMPERATURE: 97.7 F | SYSTOLIC BLOOD PRESSURE: 103 MMHG

## 2025-06-12 DIAGNOSIS — E78.1 HYPERTRIGLYCERIDEMIA: ICD-10-CM

## 2025-06-12 DIAGNOSIS — M75.122 COMPLETE TEAR OF LEFT ROTATOR CUFF, UNSPECIFIED WHETHER TRAUMATIC: Primary | ICD-10-CM

## 2025-06-12 DIAGNOSIS — Z79.4 TYPE 2 DIABETES MELLITUS WITHOUT COMPLICATION, WITH LONG-TERM CURRENT USE OF INSULIN: ICD-10-CM

## 2025-06-12 DIAGNOSIS — I10 PRIMARY HYPERTENSION: ICD-10-CM

## 2025-06-12 DIAGNOSIS — E11.9 TYPE 2 DIABETES MELLITUS WITHOUT COMPLICATION, WITH LONG-TERM CURRENT USE OF INSULIN: ICD-10-CM

## 2025-06-12 PROCEDURE — 4010F ACE/ARB THERAPY RXD/TAKEN: CPT | Performed by: FAMILY MEDICINE

## 2025-06-12 PROCEDURE — 3074F SYST BP LT 130 MM HG: CPT | Performed by: FAMILY MEDICINE

## 2025-06-12 PROCEDURE — 3008F BODY MASS INDEX DOCD: CPT | Performed by: FAMILY MEDICINE

## 2025-06-12 PROCEDURE — 99214 OFFICE O/P EST MOD 30 MIN: CPT | Performed by: FAMILY MEDICINE

## 2025-06-12 PROCEDURE — 1036F TOBACCO NON-USER: CPT | Performed by: FAMILY MEDICINE

## 2025-06-12 PROCEDURE — 3044F HG A1C LEVEL LT 7.0%: CPT | Performed by: FAMILY MEDICINE

## 2025-06-12 PROCEDURE — 3078F DIAST BP <80 MM HG: CPT | Performed by: FAMILY MEDICINE

## 2025-06-12 ASSESSMENT — PATIENT HEALTH QUESTIONNAIRE - PHQ9
SUM OF ALL RESPONSES TO PHQ9 QUESTIONS 1 AND 2: 0
2. FEELING DOWN, DEPRESSED OR HOPELESS: NOT AT ALL
1. LITTLE INTEREST OR PLEASURE IN DOING THINGS: NOT AT ALL

## 2025-06-12 NOTE — ASSESSMENT & PLAN NOTE
Review of lipids 3/6/2025 show cholesterol 131 triglycerides 351 with an LDL of 56 encouraged diet compliance..  Consider institution of trialOf fish oil tablets however medicine needs to be held 7 days prior to surgery postsurgery this can be restarted

## 2025-06-12 NOTE — PROGRESS NOTES
Subjective   Patient ID: Patient follow-up of last visit and also preoperative clearance.  Let visit patient he had issues regarding  Diabetes with globin A1c of 7.8.  Since that time patient has been taking Mounjaro and trying to follow diet exercise routine and today is here to discuss diabetic management.  He continues glipizide and he has been successful with Mounjaro with change in diet and weight loss of several pounds as last visit... currently on     5mg and appears to be helping ..  Also since last visit patient has been following up with neurology for management of neuropathy and meds controlled with Lyrica and he is try with this control.    Patient was seen by orthopedics and evaluated with results of MRI showing high-grade partial-thickness tear of the subscapularis with full-thickness tear of the distal supraspinatus tendon with slight retraction.  The long head of the biceps that showed tendinosis and tearing with degenerative tearing of the posterior superior labrum with moderate AC osteoarthritis.  Patient is currently scheduled for surgical intervention with rotator cuff repair of the subscapularis and supraspinatus Spinato subacromial decompression with open biceps tenodesis.  This will include diagnostic arthroscopic rotator cuff repair with repair of biceps tendon as well.      Review of Systems  Review of systems  Review of systems: No frequent nosebleeds.  Patient denies dyspnea, denies symptoms of respiratory infection, denies chest pain,  Constitutional: Not feeling tired and no fever  Eyes: No eyesight problems, no redness and no pain.  ENT: No ear pain, no swollen lymph nodes, no sore throat, no nasal discharge and nasal congestion.  Cardiovascular: No chest pain, no palpitations and no lower extremity edema.  Respiratory: No cough dyspnea with exertion, no dyspnea at rest and no wheezing.  Gastrointestinal: No abdominal pain, no constipation, no diarrhea, no heartburn had no  nausea.  Genitourinary: No dysuria, no hesitancy, normal urinary frequency.  Musculoskeletal: YESArthralgias .. LEFT SHOULDER but no myalgias and no joint swelling  Integumentary: No skin lesions and no rashes.  Neurologic:  NO  Dizziness but no headache YESnumbness or tingling...TOES , no seizures, YES limb weakness  LEFT SHOULDER, no memory changes and had no speech difficulty  Psychiatric: No mood changes, no sleep disturbances, no substance use and no feelings of anxiety.  Endocrine: No weight change and no temperature intolerance.  Hematologic/lymphatic: No easy bruising and no swollen glands    FUNCTIONAL CAPACITY:   - using Duke Activity Status Index score: 9.89 METs     Are you able to:  Take care of self (eating, dressing, bathing, using the toilet): yes   Walk indoors: yes   Walk 1-2 blocks on level ground: yes   Climb a flight of stairs or walk up a hill: yes   Run a short distance: yes   Do light work around the house (dusting, washing dishes): yes   Do moderate work around the house (vacuuming, sweeping floors, carrying in groceries): yes   Do heavy work around the house (scrubbing floors, lifting or moving heavy furniture): yes   Do yardwork (raking leaves, weeding, pushing a power mower): yes   Have sexual relations: yes   Participate in moderate recreational activities (golf, bowling, dancing, doubles tennis, throwing a baseball or football): yes   Participate in strenuous sports (swimming, singles tennis, football, basketball, skiing): yes      REVISED CARDIAC RISK INDEX  - Elevated-risk surgery (Intraperitoneal; intrathoracic; suprainguinal vascular): no   - History of ischemic heart disease (History of myocardial infarction (MI); history of positive exercise test; current chest pain considered due to myocardial ischemia; use of nitrate therapy or ECG with pathological Q waves): no  - History of congestive heart failure (Pulmonary edema, bilateral rales or S3 gallop; paroxysmal nocturnal dyspnea;  chest x-ray (CXR) showing pulmonary vascular redistribution): no  History of cerebrovascular disease (Prior transient ischemic attack (TIA) or stroke): no  - Pre-operative treatment with insulin: no  - Pre-operative creatinine >2 mg/dL / 176.8 µmol/L: no  Total Score is: 0     STOP-BANG SCORE  Do you snore loudly? (Louder than talking or loud enough to be heard through closed doors): no  Do you often feel tired, fatigued, or sleepy during the daytime?: no   Has anyone observed you stop breathing during sleep?: no  Do you have (or are you being treated for) high blood pressure?: no  Objective measures:  BMI > 35kg/m2: no  Age > 50 years: ys   Neck circumference > 40: yes   Male gender: yes  Total Scroe: 1 which correlates with  risk of moderate to severe DAE          Surgical risk assessment:  Prior anesthesia: he  had prior anesthesia.  DENIES  prior adverse reaction to general anesthesia.    Pertinent past medical history: Pulmonary embolism, DVT,YES  diabetes, neck osteoarthritis,  NO wearing denture, seizure disorder, CVA, asthma, COPD, obstructive sleep apnea, and renal disease denies..       Lifestyle factors: Denies tobacco use, denies heavy alcohol consumption, and denies illegal drug use.         Living situation: No.  Concerns with his living situation and living independently with his family.                Objective   Physical Exam  Vitals: I have reviewed the vitals  General: Well-developed.  In no acute distress.  Eyes:   sclera nonicteric.  Conjunctiva not injected.  No discharge.   HEAD: Normocephalic, atraumatic.  HEENT   Mucous membranes moist.  Posterior oropharynx nonerythematous, no tonsillar exudates.      No cervical lymphadenopathy.  Cardio: Regular rate and rhythm.  No murmur, rub or gallop.  Pulmonary: Lungs clear to auscultation in all fields.  No accessory muscle use.  GI/: Normal active bowel sounds.  Soft, nontender.  No masses or organomegaly appreciated.  Musculoskeletal: No gross  deformities appreciated.  Neuro: Alert, age-appropriate.  Normal muscle tone.  Moving all extremities.  Skin: No rash, bruises or lesions.  Results of labs showed GFR of 69 with normal LFTs.  In addition hemoglobin A1c 6.3 and AP TT 30 with hemoglobin 15.5 macro 25.7 WBC 7.0 platelets 157    Labs        Assessment/Plan   Problem List Items Addressed This Visit       Type 2 diabetes mellitus without complication, with long-term current use of insulin    Hemoglobin A1c with excellent improvement at 6.3 continue current treatment that is Mounjaro and glipizide         Primary hypertension    Blood pressure with excellent control continue meds         Hypertriglyceridemia    Review of lipids 3/6/2025 show cholesterol 131 triglycerides 351 with an LDL of 56 encouraged diet compliance..  Consider institution of trialOf fish oil tablets however medicine needs to be held 7 days prior to surgery postsurgery this can be restarted         Complete rotator cuff tear or rupture of left shoulder, not specified as traumatic - Primary    Low  for intended surgery proceed as planned.  Patient advised to hold glipizide 1 day prior to surgery  Instructed on NSAIDs and vitamins NONE  7 days prior to surgery

## 2025-06-12 NOTE — ASSESSMENT & PLAN NOTE
Low  for intended surgery proceed as planned.  Patient advised to hold glipizide 1 day prior to surgery  Instructed on NSAIDs and vitamins NONE  7 days prior to surgery

## 2025-06-12 NOTE — ASSESSMENT & PLAN NOTE
Hemoglobin A1c with excellent improvement at 6.3 continue current treatment that is Mounjaro and glipizide

## 2025-06-16 ENCOUNTER — ANESTHESIA EVENT (OUTPATIENT)
Dept: OPERATING ROOM | Facility: HOSPITAL | Age: 65
End: 2025-06-16
Payer: MEDICARE

## 2025-06-16 DIAGNOSIS — M75.102 TEAR OF LEFT ROTATOR CUFF, UNSPECIFIED TEAR EXTENT, UNSPECIFIED WHETHER TRAUMATIC: ICD-10-CM

## 2025-06-16 DIAGNOSIS — M75.21 BICEPS TENDINITIS ON RIGHT: ICD-10-CM

## 2025-06-16 RX ORDER — OXYCODONE AND ACETAMINOPHEN 5; 325 MG/1; MG/1
1 TABLET ORAL EVERY 6 HOURS PRN
Qty: 28 TABLET | Refills: 0 | Status: SHIPPED | OUTPATIENT
Start: 2025-06-16

## 2025-06-16 NOTE — DISCHARGE INSTRUCTIONS
POST-OPERATIVE INSTRUCTIONS:  ARTHROSCOPY WITH ROTATOR CUFF REPAIR AND BICEPS TENODESIS  Dr. Thomas Samson III, MD      ACTIVITY/SLING  ·Please keep a sling on. You may remove it for hygiene and for exercises, which includes moving the elbow and wrist with the elbow tucked into your side.  ·You may not reach out (forward or to the side), up or behind you.  Please keep your elbow tucked into your side.   ·You may not push, pull, lift, carry, or climb until after follow up      DRESSINGS & INCISIONS  ·The first two days after surgery you can expect a small amount of red-tinged drainage on your dressings.  This is normal.   ·Please keep the dressing clean and dry; if you are going to shower/bathe, you must protect the dressing. You may not swim in a pool, lake, hot tub, or the ocean until the sutures have been removed.  ·You may remove the dressing 4 days after surgery (white foam tape, white gauze pads, yellow gauze tape).  You may shower after dressings have been removed  ·You may apply Band AidsÒ over the incisions.    ·Please do not use BacitracinÒ or other ointments on the incisions.      PAIN & INFLAMMATION  ·Ice - Apply ice several times per day for 20 minutes for the first week and then as needed for pain relief and inflammation.      ·Pain Medication  You have been given a prescription for pain control; please take as directed.  If you think you will require a refill on your medication, you MUST do so during our regular weekday office hours.  If you need additional pain medication you may take Tylenol 500-650mg every 4-6 hours. Do not take more than 3grams or 3000mg in a 24 hour period!  Common side effects of the pain medication are:  NAUSEA: To decrease nausea, take these medications with food.  DROWSINESS: Do not drive a car or operate machinery.  ITCHING: You may take Benadryl to alleviate any itching.  CONSTIPATION: To decrease constipation, use the stool softener provided (Docusate 250mg) or  over-the-counter remedies (Milk of Magnesia, etc).  Also avoid bananas, rice, apples, toast, or yogurt…as these foods can make you constipated.  Getting up and moving around also helps with constipation and “waking up” your intestinal tract.  Anti-inflammatory medications (Aleve, Ibuprofen, Naproxen, etc.) may be taken. Recommend alternating pain medication prescription and ibuprofen every 3 hours so that some medication is always in your body. Especially the first couple days after surgery. For example time 0-percocet, 3 hours later motrin, 3 hours later Percocet, 3 hours later motrin… if you tolerate anti-inflammtories      EMERGENCIES  ·Please have someone stay with you for the first 24 hours after surgery  ·Please call the clinic or the orthopaedist on-call if:  Drainage soaks the dressings, expands, is foul-smelling, or your incisions are red, warm, and extremely painful  You develop a fever (>101.5°) or chills  You experience leg or calf pain, leg swelling, or difficulty breathing      FOLLOW-UP CARE  ·Please schedule a follow-up appointment for suture removal, x-rays, and to review your surgery 7-10 days postoperatively.         IF YOU HAVE ANY QUESTIONS OR CONCERNS, PLEASE FEEL FREE TO CALL THE OFFICE at [phone].                          EXERCISES    · strengthening:    With the arm in the sling,  a rubber ball, old tennis ball, or beanbag.  Hold for 5 seconds and release.      ·Shoulder Shrugs:  Keep your arm close to your body.  Shrug your shoulders upwards and hold for 5 seconds.  Slowly lower your shoulders  Repeat           ·Shoulder Rows:  Keep your arm close to your body.  Pull your shoulders forward by rounding your back. Hold for 5 seconds.  Slowly unround your back and stand normally.  Pull your shoulders back by trying to “pinch” your shoulder blades together. Hold for 5 seconds.  Repeat     NEUTRAL                  General Anesthesia Discharge Instructions    About this topic  You  may need general anesthesia if you need to be asleep during a procedure. Your doctor will use drugs to block the signals that go from your nerves to your brain. Doctors give general anesthesia during a surgery or procedure to:  Allow you to sleep  Help your body be still  Relax your muscles  Help you to relax and be pain free  Keep you from remembering the surgery  Let the doctor manage your airway, breathing, and blood flow  The doctor or nurse anesthetist gives general anesthesia by a shot into your vein. Sometimes, you may breathe in a gas through a mask placed over your face.  What care is needed at home?  Ask your doctor what you need to do when you go home. Make sure you ask questions if you do not understand what the doctor says.  Your doctor may give you drugs to prevent or treat an upset stomach from the anesthetic. Take them as ordered.  If your throat is sore, suck on ice chips or popsicles to ease throat pain.  Put 2 to 3 pillows under your head and back when you lie down to help you breathe easier.  For the first 24 to 48 hours:  Do not operate heavy or dangerous machinery.  Do not make major decisions or sign important papers. You may not be able to think clearly.  Avoid beer, wine, or mixed drinks.  You are at a higher risk of falling for at least 24 hours after general anesthesia.  Take extra care when you get up.  Do not change positions quickly.  Do not rush when you need to go to the bathroom or to answer the phone.  Ask for help if you feel unsteady when you try to walk.  Wear shoes with non-slip soles and low heels.  What follow-up care is needed?  Your doctor may ask you to come back to the office to check on your progress. Be sure to keep these visits.  If you have stitches that do not dissolve or staples, you will need to have them removed. Your doctor will want to do this in 1 to 2 weeks. If the doctor used skin glue, the glue will fall off on its own.  What drugs may be needed?  The doctor  may order drugs to:  Help with pain  Treat an upset stomach or throwing up  Will physical activity be limited?  You will not be allowed to drive right away after the procedure. Ask a family member or a friend to drive you home.  Avoid trying to get out of bed without help until you are sure of your balance.  You may have to limit your activity. Talk to your doctor about if you need to limit how much you lift or limit exercise after your procedure.  What changes to diet are needed?  Start with a light diet when you are fully awake. This includes things that are easy to swallow like soups, pudding, jello, toast, and eggs. Slowly progress to your normal diet.  What problems could happen?  Low blood pressure  Breathing problems  Upset stomach or throwing up  Dizziness  Blood clots  Infection  When do I need to call the doctor?  Trouble breathing  Upset stomach or throwing up more than 3 times in the next 2 days  Dizziness  Teach Back: Helping You Understand  The Teach Back Method helps you understand the information we are giving you. After you talk with the staff, tell them in your own words what you learned. This helps to make sure the staff has described each thing clearly. It also helps to explain things that may have been confusing. Before going home, make sure you can do these:  I can tell you about my procedure.  I can tell you if I need to follow up with my doctor.  I can tell you what is good for me to eat and drink the next day.  I can tell you what I would do if I have trouble breathing, an upset stomach, or dizziness.  Where can I learn more?  [employer]  https://www.nigms.nih.gov/education/pages/factsheet_Anesthesia.aspx  [facility] Choices  http://www.nhs.uk/conditions/Anaesthetic-general/Pages/Definition.aspx  Last Reviewed Date  2020-04-22    Physician phone number provided to patient

## 2025-06-17 ENCOUNTER — HOSPITAL ENCOUNTER (OUTPATIENT)
Facility: HOSPITAL | Age: 65
Setting detail: OUTPATIENT SURGERY
Discharge: HOME | End: 2025-06-17
Attending: STUDENT IN AN ORGANIZED HEALTH CARE EDUCATION/TRAINING PROGRAM | Admitting: STUDENT IN AN ORGANIZED HEALTH CARE EDUCATION/TRAINING PROGRAM
Payer: MEDICARE

## 2025-06-17 ENCOUNTER — ANESTHESIA (OUTPATIENT)
Dept: OPERATING ROOM | Facility: HOSPITAL | Age: 65
End: 2025-06-17
Payer: MEDICARE

## 2025-06-17 VITALS
DIASTOLIC BLOOD PRESSURE: 84 MMHG | WEIGHT: 177.25 LBS | BODY MASS INDEX: 22.75 KG/M2 | HEART RATE: 62 BPM | RESPIRATION RATE: 17 BRPM | TEMPERATURE: 96.8 F | OXYGEN SATURATION: 96 % | HEIGHT: 74 IN | SYSTOLIC BLOOD PRESSURE: 128 MMHG

## 2025-06-17 DIAGNOSIS — S46.012A TRAUMATIC COMPLETE TEAR OF LEFT ROTATOR CUFF, INITIAL ENCOUNTER: ICD-10-CM

## 2025-06-17 DIAGNOSIS — M75.22 BICIPITAL TENDINITIS, LEFT SHOULDER: Primary | ICD-10-CM

## 2025-06-17 LAB
GLUCOSE BLD MANUAL STRIP-MCNC: 157 MG/DL (ref 74–99)
GLUCOSE BLD MANUAL STRIP-MCNC: 172 MG/DL (ref 74–99)

## 2025-06-17 PROCEDURE — 2500000004 HC RX 250 GENERAL PHARMACY W/ HCPCS (ALT 636 FOR OP/ED): Performed by: ANESTHESIOLOGY

## 2025-06-17 PROCEDURE — 2780000003 HC OR 278 NO HCPCS: Performed by: STUDENT IN AN ORGANIZED HEALTH CARE EDUCATION/TRAINING PROGRAM

## 2025-06-17 PROCEDURE — 7100000010 HC PHASE TWO TIME - EACH INCREMENTAL 1 MINUTE: Performed by: STUDENT IN AN ORGANIZED HEALTH CARE EDUCATION/TRAINING PROGRAM

## 2025-06-17 PROCEDURE — 3700000002 HC GENERAL ANESTHESIA TIME - EACH INCREMENTAL 1 MINUTE: Performed by: STUDENT IN AN ORGANIZED HEALTH CARE EDUCATION/TRAINING PROGRAM

## 2025-06-17 PROCEDURE — 23430 REPAIR BICEPS TENDON: CPT

## 2025-06-17 PROCEDURE — C1713 ANCHOR/SCREW BN/BN,TIS/BN: HCPCS | Performed by: STUDENT IN AN ORGANIZED HEALTH CARE EDUCATION/TRAINING PROGRAM

## 2025-06-17 PROCEDURE — 82947 ASSAY GLUCOSE BLOOD QUANT: CPT

## 2025-06-17 PROCEDURE — 29827 SHO ARTHRS SRG RT8TR CUF RPR: CPT

## 2025-06-17 PROCEDURE — 23430 REPAIR BICEPS TENDON: CPT | Performed by: STUDENT IN AN ORGANIZED HEALTH CARE EDUCATION/TRAINING PROGRAM

## 2025-06-17 PROCEDURE — 29823 SHO ARTHRS SRG XTNSV DBRDMT: CPT | Performed by: STUDENT IN AN ORGANIZED HEALTH CARE EDUCATION/TRAINING PROGRAM

## 2025-06-17 PROCEDURE — 3700000001 HC GENERAL ANESTHESIA TIME - INITIAL BASE CHARGE: Performed by: STUDENT IN AN ORGANIZED HEALTH CARE EDUCATION/TRAINING PROGRAM

## 2025-06-17 PROCEDURE — 29827 SHO ARTHRS SRG RT8TR CUF RPR: CPT | Performed by: STUDENT IN AN ORGANIZED HEALTH CARE EDUCATION/TRAINING PROGRAM

## 2025-06-17 PROCEDURE — 2500000004 HC RX 250 GENERAL PHARMACY W/ HCPCS (ALT 636 FOR OP/ED)

## 2025-06-17 PROCEDURE — 2720000007 HC OR 272 NO HCPCS: Performed by: STUDENT IN AN ORGANIZED HEALTH CARE EDUCATION/TRAINING PROGRAM

## 2025-06-17 PROCEDURE — 7100000009 HC PHASE TWO TIME - INITIAL BASE CHARGE: Performed by: STUDENT IN AN ORGANIZED HEALTH CARE EDUCATION/TRAINING PROGRAM

## 2025-06-17 PROCEDURE — 7100000002 HC RECOVERY ROOM TIME - EACH INCREMENTAL 1 MINUTE: Performed by: STUDENT IN AN ORGANIZED HEALTH CARE EDUCATION/TRAINING PROGRAM

## 2025-06-17 PROCEDURE — 29823 SHO ARTHRS SRG XTNSV DBRDMT: CPT

## 2025-06-17 PROCEDURE — 3600000004 HC OR TIME - INITIAL BASE CHARGE - PROCEDURE LEVEL FOUR: Performed by: STUDENT IN AN ORGANIZED HEALTH CARE EDUCATION/TRAINING PROGRAM

## 2025-06-17 PROCEDURE — 2500000001 HC RX 250 WO HCPCS SELF ADMINISTERED DRUGS (ALT 637 FOR MEDICARE OP): Performed by: ANESTHESIOLOGY

## 2025-06-17 PROCEDURE — 2500000005 HC RX 250 GENERAL PHARMACY W/O HCPCS: Performed by: STUDENT IN AN ORGANIZED HEALTH CARE EDUCATION/TRAINING PROGRAM

## 2025-06-17 PROCEDURE — 29826 SHO ARTHRS SRG DECOMPRESSION: CPT

## 2025-06-17 PROCEDURE — 7100000001 HC RECOVERY ROOM TIME - INITIAL BASE CHARGE: Performed by: STUDENT IN AN ORGANIZED HEALTH CARE EDUCATION/TRAINING PROGRAM

## 2025-06-17 PROCEDURE — 2500000004 HC RX 250 GENERAL PHARMACY W/ HCPCS (ALT 636 FOR OP/ED): Mod: JW | Performed by: ANESTHESIOLOGY

## 2025-06-17 PROCEDURE — 2500000004 HC RX 250 GENERAL PHARMACY W/ HCPCS (ALT 636 FOR OP/ED): Performed by: NURSE ANESTHETIST, CERTIFIED REGISTERED

## 2025-06-17 PROCEDURE — 3600000009 HC OR TIME - EACH INCREMENTAL 1 MINUTE - PROCEDURE LEVEL FOUR: Performed by: STUDENT IN AN ORGANIZED HEALTH CARE EDUCATION/TRAINING PROGRAM

## 2025-06-17 DEVICE — SPEEDBRG IMP SYS W/BIO-COMP SWVLK
Type: IMPLANTABLE DEVICE | Site: SHOULDER | Status: FUNCTIONAL
Brand: ARTHREX®

## 2025-06-17 DEVICE — PROXIMAL TENODESIS IMPLANT SYSTEM REV: 0
Type: IMPLANTABLE DEVICE | Site: SHOULDER | Status: FUNCTIONAL
Brand: ARTHREX®

## 2025-06-17 DEVICE — IMPLANTABLE DEVICE: Type: IMPLANTABLE DEVICE | Site: SHOULDER | Status: FUNCTIONAL

## 2025-06-17 DEVICE — ANCHOR, BIOCOMPOSITE SWIVELOCK 4.75 X 19.1: Type: IMPLANTABLE DEVICE | Site: SHOULDER | Status: FUNCTIONAL

## 2025-06-17 RX ORDER — OXYCODONE HYDROCHLORIDE 5 MG/1
5 TABLET ORAL EVERY 4 HOURS PRN
Status: DISCONTINUED | OUTPATIENT
Start: 2025-06-17 | End: 2025-06-17 | Stop reason: HOSPADM

## 2025-06-17 RX ORDER — SODIUM CHLORIDE, SODIUM LACTATE, POTASSIUM CHLORIDE, CALCIUM CHLORIDE 600; 310; 30; 20 MG/100ML; MG/100ML; MG/100ML; MG/100ML
100 INJECTION, SOLUTION INTRAVENOUS CONTINUOUS
Status: DISCONTINUED | OUTPATIENT
Start: 2025-06-17 | End: 2025-06-17 | Stop reason: HOSPADM

## 2025-06-17 RX ORDER — MEPERIDINE HYDROCHLORIDE 25 MG/ML
12.5 INJECTION INTRAMUSCULAR; INTRAVENOUS; SUBCUTANEOUS EVERY 10 MIN PRN
Status: DISCONTINUED | OUTPATIENT
Start: 2025-06-17 | End: 2025-06-17 | Stop reason: HOSPADM

## 2025-06-17 RX ORDER — DIPHENHYDRAMINE HYDROCHLORIDE 50 MG/ML
INJECTION, SOLUTION INTRAMUSCULAR; INTRAVENOUS AS NEEDED
Status: DISCONTINUED | OUTPATIENT
Start: 2025-06-17 | End: 2025-06-17

## 2025-06-17 RX ORDER — FAMOTIDINE 10 MG/ML
INJECTION INTRAVENOUS AS NEEDED
Status: DISCONTINUED | OUTPATIENT
Start: 2025-06-17 | End: 2025-06-17

## 2025-06-17 RX ORDER — SODIUM CHLORIDE 0.9 G/100ML
INJECTION, SOLUTION IRRIGATION AS NEEDED
Status: DISCONTINUED | OUTPATIENT
Start: 2025-06-17 | End: 2025-06-17 | Stop reason: HOSPADM

## 2025-06-17 RX ORDER — FENTANYL CITRATE 50 UG/ML
INJECTION, SOLUTION INTRAMUSCULAR; INTRAVENOUS AS NEEDED
Status: DISCONTINUED | OUTPATIENT
Start: 2025-06-17 | End: 2025-06-17

## 2025-06-17 RX ORDER — PROPOFOL 10 MG/ML
INJECTION, EMULSION INTRAVENOUS AS NEEDED
Status: DISCONTINUED | OUTPATIENT
Start: 2025-06-17 | End: 2025-06-17

## 2025-06-17 RX ORDER — MIDAZOLAM HYDROCHLORIDE 1 MG/ML
INJECTION, SOLUTION INTRAMUSCULAR; INTRAVENOUS
Status: COMPLETED | OUTPATIENT
Start: 2025-06-17 | End: 2025-06-17

## 2025-06-17 RX ORDER — ONDANSETRON HYDROCHLORIDE 2 MG/ML
INJECTION, SOLUTION INTRAVENOUS AS NEEDED
Status: DISCONTINUED | OUTPATIENT
Start: 2025-06-17 | End: 2025-06-17

## 2025-06-17 RX ORDER — LIDOCAINE HYDROCHLORIDE 20 MG/ML
INJECTION, SOLUTION INFILTRATION; PERINEURAL AS NEEDED
Status: DISCONTINUED | OUTPATIENT
Start: 2025-06-17 | End: 2025-06-17

## 2025-06-17 RX ORDER — ONDANSETRON HYDROCHLORIDE 2 MG/ML
4 INJECTION, SOLUTION INTRAVENOUS ONCE AS NEEDED
Status: DISCONTINUED | OUTPATIENT
Start: 2025-06-17 | End: 2025-06-17 | Stop reason: HOSPADM

## 2025-06-17 RX ORDER — SODIUM CHLORIDE, SODIUM LACTATE, POTASSIUM CHLORIDE, CALCIUM CHLORIDE 600; 310; 30; 20 MG/100ML; MG/100ML; MG/100ML; MG/100ML
50 INJECTION, SOLUTION INTRAVENOUS CONTINUOUS
Status: DISCONTINUED | OUTPATIENT
Start: 2025-06-17 | End: 2025-06-17 | Stop reason: HOSPADM

## 2025-06-17 RX ORDER — CEFAZOLIN SODIUM 2 G/50ML
2 SOLUTION INTRAVENOUS ONCE
Status: COMPLETED | OUTPATIENT
Start: 2025-06-17 | End: 2025-06-17

## 2025-06-17 RX ORDER — FENTANYL CITRATE 50 UG/ML
50 INJECTION, SOLUTION INTRAMUSCULAR; INTRAVENOUS EVERY 5 MIN PRN
Status: DISCONTINUED | OUTPATIENT
Start: 2025-06-17 | End: 2025-06-17 | Stop reason: HOSPADM

## 2025-06-17 RX ORDER — ROCURONIUM BROMIDE 10 MG/ML
INJECTION, SOLUTION INTRAVENOUS AS NEEDED
Status: DISCONTINUED | OUTPATIENT
Start: 2025-06-17 | End: 2025-06-17

## 2025-06-17 RX ADMIN — FENTANYL CITRATE 25 MCG: 50 INJECTION, SOLUTION INTRAMUSCULAR; INTRAVENOUS at 14:15

## 2025-06-17 RX ADMIN — ROCURONIUM BROMIDE 50 MG: 10 SOLUTION INTRAVENOUS at 12:40

## 2025-06-17 RX ADMIN — DEXAMETHASONE SODIUM PHOSPHATE: 10 INJECTION, SOLUTION INTRAMUSCULAR; INTRAVENOUS at 11:54

## 2025-06-17 RX ADMIN — FAMOTIDINE 20 MG: 10 INJECTION, SOLUTION INTRAVENOUS at 12:32

## 2025-06-17 RX ADMIN — SODIUM CHLORIDE, SODIUM LACTATE, POTASSIUM CHLORIDE, AND CALCIUM CHLORIDE 50 ML/HR: .6; .31; .03; .02 INJECTION, SOLUTION INTRAVENOUS at 11:09

## 2025-06-17 RX ADMIN — FENTANYL CITRATE 50 MCG: 50 INJECTION, SOLUTION INTRAMUSCULAR; INTRAVENOUS at 12:38

## 2025-06-17 RX ADMIN — PROPOFOL 200 MG: 10 INJECTION, EMULSION INTRAVENOUS at 12:40

## 2025-06-17 RX ADMIN — CEFAZOLIN SODIUM 2 G: 2 SOLUTION INTRAVENOUS at 12:46

## 2025-06-17 RX ADMIN — SUGAMMADEX 200 MG: 100 INJECTION, SOLUTION INTRAVENOUS at 14:30

## 2025-06-17 RX ADMIN — DEXAMETHASONE SODIUM PHOSPHATE 4 MG: 4 INJECTION, SOLUTION INTRAMUSCULAR; INTRAVENOUS at 12:49

## 2025-06-17 RX ADMIN — MIDAZOLAM HYDROCHLORIDE 5 MG: 1 INJECTION, SOLUTION INTRAMUSCULAR; INTRAVENOUS at 11:54

## 2025-06-17 RX ADMIN — ROCURONIUM BROMIDE 10 MG: 10 SOLUTION INTRAVENOUS at 14:13

## 2025-06-17 RX ADMIN — FENTANYL CITRATE 25 MCG: 50 INJECTION, SOLUTION INTRAMUSCULAR; INTRAVENOUS at 14:09

## 2025-06-17 RX ADMIN — OXYCODONE HYDROCHLORIDE 5 MG: 5 TABLET ORAL at 16:23

## 2025-06-17 RX ADMIN — LIDOCAINE HYDROCHLORIDE 60 MG: 20 INJECTION, SOLUTION INFILTRATION; PERINEURAL at 12:40

## 2025-06-17 RX ADMIN — ONDANSETRON 4 MG: 2 INJECTION, SOLUTION INTRAMUSCULAR; INTRAVENOUS at 14:06

## 2025-06-17 RX ADMIN — DIPHENHYDRAMINE HYDROCHLORIDE 12.5 MG: 50 INJECTION, SOLUTION INTRAMUSCULAR; INTRAVENOUS at 13:13

## 2025-06-17 SDOH — HEALTH STABILITY: MENTAL HEALTH: CURRENT SMOKER: 0

## 2025-06-17 ASSESSMENT — PAIN SCALES - GENERAL
PAIN_LEVEL: 0
PAINLEVEL_OUTOF10: 2
PAINLEVEL_OUTOF10: 0 - NO PAIN
PAINLEVEL_OUTOF10: 3
PAINLEVEL_OUTOF10: 0 - NO PAIN
PAINLEVEL_OUTOF10: 4

## 2025-06-17 ASSESSMENT — PAIN DESCRIPTION - DESCRIPTORS
DESCRIPTORS: DULL;NAGGING
DESCRIPTORS: ACHING

## 2025-06-17 ASSESSMENT — COLUMBIA-SUICIDE SEVERITY RATING SCALE - C-SSRS
1. IN THE PAST MONTH, HAVE YOU WISHED YOU WERE DEAD OR WISHED YOU COULD GO TO SLEEP AND NOT WAKE UP?: NO
6. HAVE YOU EVER DONE ANYTHING, STARTED TO DO ANYTHING, OR PREPARED TO DO ANYTHING TO END YOUR LIFE?: NO
2. HAVE YOU ACTUALLY HAD ANY THOUGHTS OF KILLING YOURSELF?: NO

## 2025-06-17 ASSESSMENT — PAIN - FUNCTIONAL ASSESSMENT
PAIN_FUNCTIONAL_ASSESSMENT: 0-10

## 2025-06-17 NOTE — ANESTHESIA PREPROCEDURE EVALUATION
Patient: Wan Dunlap    Procedure Information       Date/Time: 06/17/25 1220    Procedure: ARTHROSCOPY ROTATOR CUFF REPAIR, DEBRIDEMENT, BICEPS TENODESIS (Left: Shoulder) - diabetic  [facility]X  [facility]    Location: [facility]    Surgeons: Thomas Samson MD            Relevant Problems   Cardiac   (+) Abnormal EKG   (+) Hypertriglyceridemia   (+) Primary hypertension      Neuro   (+) Sciatica of right side      Endocrine   (+) Diabetic neuropathy associated with type 2 diabetes mellitus   (+) Type 2 diabetes mellitus without complication, with long-term current use of insulin       Clinical information reviewed:   Tobacco  Allergies  Meds   Med Hx  Surg Hx   Fam Hx  Soc Hx        NPO Detail:  No data recorded     Physical Exam    Airway  Mallampati: II  TM distance: >3 FB  Neck ROM: full  Mouth opening: 3 or more finger widths     Cardiovascular    Dental - normal exam     Pulmonary    Abdominal            Anesthesia Plan    History of general anesthesia?: yes  History of complications of general anesthesia?: no    ASA 2     general   (Interscalene nerve block)  The patient is not a current smoker.    intravenous induction   Anesthetic plan and risks discussed with patient.    Plan discussed with CRNA.

## 2025-06-17 NOTE — OP NOTE
ARTHROSCOPY ROTATOR CUFF REPAIR, EXTENSIVE DEBRIDEMENT, OPEN BICEPS TENODESIS (L) Operative Note    Date: 2025  OR Location: [facility]    Name: Wan Dunlap, : 1960, Age: 65 y.o., MRN: [id number], Sex: male    Diagnosis  Pre-op Diagnosis      * Complete rotator cuff tear or rupture of left shoulder, not specified as traumatic [M75.122]     * Bicipital tendinitis, left shoulder [M75.22] Postoperative diagnosis: Massive rotator cuff tear involving the supraspinatus tendon subscapularis tendon, downsloping acromion, labrum fraying anteriorly, extensive biceps tenosynovitis, partial biceps tearing     Procedures  #1: Arthroscopic massive rotator cuff repair 85545  #2: Arthroscopic subacromial decompression 64735  #3: Arthroscopic extensive debridement 00131  #4: Open subpectoral biceps tenodesis 18214    Surgeons      * Thomas Samson - Primary    Resident/Fellow/Other Assistant:  Surgeons and Role:     * Clint Henning PA-C - Assisting    Staff:   Circulator: Natanael  Scrub Person: Monica    Anesthesia Staff: Anesthesiologist: Dann Mora MD  CRNA: RASHEEDA Sood-CRNA  SRNA: Corry Alba RN    Procedure Summary  Anesthesia: General  ASA: II  Estimated Blood Loss: 20mL  Intra-op Medications:   Administrations occurring from 1220 to 1410 on 25:   Medication Name Total Dose   sodium chloride 0.9 % irrigation solution 18,000 mL   ceFAZolin (Ancef) 2 g in dextrose (iso) IV 50 mL 2 g   dexAMETHasone (Decadron) 4 mg/mL 4 mg   diphenhydrAMINE 50 mg/mL 12.5 mg   famotidine (Pepcid) 10 mg/mL 20 mg   fentaNYL PF 0.05 mg/mL 75 mcg   lidocaine (Xylocaine) injection 2 % 60 mg   ondansetron 2 mg/mL 4 mg   propofol (Diprivan) injection 10 mg/mL 200 mg   rocuronium (ZeMuron) 50 mg/5 mL injection 50 mg              Anesthesia Record               Intraprocedure I/O Totals          Intake    lactated Ringer's infusion 1000.00 mL    ceFAZolin (Ancef) 2 g in dextrose (iso) IV 50 mL 50.00 mL    Total Intake 1050 mL        Output    Est. Blood Loss 10 mL    Total Output 10 mL       Net    Net Volume 1040 mL          Specimen: No specimens collected                Drains and/or Catheters: * None in log *    Tourniquet Times:         Implants:  Implants       Type Name Action Serial No.      Implant KIT, IMPLANT SYSTEM, PROXIMAL TENODESIS - [id number] Implanted      Screw ANCHOR, BIOCOMPOSITE SWIVELOCK 4.75 X 19.1 - [id number] Implanted      Implant SPEEDBRIDGE KIT, W/BIOCOMPOSITE SWIVELOCK , 4.75 X 19.1MM - [id number] Implanted      Screw PUSHLOCK SP, PEEK, 4.5MM X 28MM - [id number] Implanted               Findings: see procedure details    Surgical indication: 65-year-old male with longstanding shoulder pain.  Patient seen in clinic and evaluated.  MRI obtained which is consistent with full-thickness tear involving the upper subscapularis, also entirety of supraspinatus, biceps tenosynovitis.  History and physical exam also consistent with these findings.  Patient has failed to improve from conservative treatment to include activity modifications oral anti-inflammatories and injections. Risk-benefit alternative treatment discussed with patient in detail using shared informed decision making patient wished to proceed with surgical treatment.  Particular risks of the surgery include but not limited to infection, neurovascular compromise, failure of repair, need for revision surgery, continued shoulder pain.  These were discussed in detail.  Using shared informed decision making patient wishes to proceed.      Findings:  Glenoid: Cartilage thinning, labrum fraying anteriorly  Humeral head: Cartilage thinning, full-thickness tear involving the supraspinatus tendon and upper edge of subscapularis tendons  Downsloping acromion, extensive biceps tenosynovitis partial biceps tendon tear    Procedure:  The patient was met in pre-operative holding and the appropriate extremity was marked.  The patient was transported to the operating room  and underwent general anesthesia.  The patient was placed in a lateral position with all bony prominences well padded including the common peroneal nerve and ulnar nerve.  An axillary roll was placed.  The ipsilateral arm was suspended with 10lbs of weight and an arm paetl was carefully wrapped around the arm.  2g ancef  were administered 30 minutes prior or to incision.       The skin was cleansed with alcohol wipes.  The operative extremity was then prepped and draped in sterile orthopedic fashion.    A posterior portal was created with an 11 blade and the glenohumeral joint was entered using a blunt trochar without issue.  A diagnostic arthroscopy was performed evaluating the articular cartilage of the humeral head and glenoid, the subscapularis, under surface of the supra and infraspinatus, the long head of the biceps, labrum and axillary recess.       An anterior portal was created in the rotator interval (outside / in) with an 18 G spinal needle and a blunt trocar was inserted. A probe was introduced and the biceps / labrum were palpated.    Using the mechanized shaver the glenoid and humeral head loose free cartilage edges were gently debrided until a stable margin was obtained, the labrum was also debrided as well until loose frayed material was removed.  The biceps stump was gently debrided with a mechanized shaver.  The upper edge of the subscapularis tendon was also debrided with a mechanized shaver removing any adhesions.    The patient was noted to have significant bursal inflammation.  After the bursectomy the acromion was inspected and noted to have some downsloping.  Using the lateral portal an acromioplasty was performed. The coracohumeral ligament was slightly recessed.   The acromioplasty was performed until the cuff was deemed to have appropriate space and the acromion had a smooth surface.         The biceps tendon was noticed to be significantly frayed with associated tenosynovitis.  As result  we elected to perform a open biceps tenodesis to decompress the shoulder as well as the bicipital groove.  The Bovie cautery was used to release the biceps from the insertion point of the superior labrum.      The subscapularis was noted to have a tear particular bout the upper half of the tendon.  We did elect to proceed with repair.  The footprint was gently debrided with a mechanized shaver.  Utilizing a an accessory portal and the anterolateral accessory portal a cannula was subsequently placed.  A scorpion is utilized to pass 2 fiber link sutures through the rotator cuff.  These were sent placed into an anchor.  A tap was utilized at the base of the subscapularis footprint and the anchor was then subsequently placed into this appropriate tension was then made to repair upper edge subscapularis tendon.  A probe subsequently going to ensure appropriate fixation.    The scope was introduce into the subacromial space.  A lateral portal was created and using a shaver and cautery wand a thorough bursectomy was performed. The rotator cuff was evaluated from the bursal side.      The patient's rotator cuff was evaluated from its articular side and bursal side.  The patient was noted to have a full-thickness tear involving the supraspinatus tendon.   We felt the patient was suitable for a rotator cuff repair.  The greater tuberosity was gently decorticated.   Once the cuff had been appropriately identified, a percutaneous spinal needle was placed just off the edge of the acromion. . A 4.75 mm swivel lock was placed off the  articular margin of the humeral head.  The anchor was set to ensure strong fixation.  This step was repeated posteriorly on the greater tuberosity.      An espresso suture passer was used to pass Fiber Tape through the supraspinatus tendon. A total of 4 strands were passed, starting at the anterior aspect of the supraspinatus tendon and proceeding posteriorly.  The sutures were sequentially shuttled  out the anterior portal.  A suture from each passsed limb through the cuff was retrieved out the lateral portal.  The sutures were then placed through a 4.75 mm bio composite anchor.  The appropriate footprint was identified and a tap was used to create a small hole for the anchor.   A 4.75mm biocomposite lateral row anchor was placed through the lateral portal distal to the greater tuberosity along the lateral cortex of the humerus and the suture limbs were tensioned to allow restoration of the cuff onto the footprint. .      The steps were repeated and a second after the repair we noted excellent coverage of the footprint and restoration of normal anatomy.  The repair was probed noting excellent tension and compression along the footprint.    There were small dogears anteriorly and posteriorly these were subsequently repaired utilizing the remaining suture from the eyelets.    There was a small dogear about mid access of the tear therefore we did place a single fiber link suture through this utilizing the scorpion suture passer. A separate self punching peek push lock anchor was also utilized to provide backup fixation.      A incision centered over the proximal biceps was then made approximately 4 cm in length.  Hemostasis was obtained with Bovie cautery in the subcutaneous tissues.  With blunt finger dissection the bicipital groove was palpated the overlying transhumerall ligament was gently dissected and the underlying biceps tendon was identified.  A right angle clamp was used to spread underneath the biceps tendon and again visually identify this tendon within the biceps groove.  Gentle traction was placed on the biceps and this was easily retracted and removed from the bicipital groove.  The tendon was whipstitched from the musculocutaneous junction up the tendon.  A small hole for the biceps button was then drilled at the apex of the incision within the bicipital groove.  The proximal stump was then  debrided.  The suture button was then placed unit cortically into the humerus with excellent fixation obtained.  The biceps was then tenodesed at its native footprint.  A backup suture was placed through the tendon and several half hitches were placed to ensure security.    The shoulder was copiously lavaged and closed using 3-0 vicryl and 3-0 monofilament suture. Sterile 4 x 4's, abd pads were placed followed by foam tape.  The patient was placed in a sling and stable to recovery.  All counts were reported as correct.  There were no complications.    The physician assistant was present for the entire case.  Given the nature of the procedure and disease process a skilled surgical assistant was necessary for the case.  The assistant was necessary for retraction and helped directly facilitate completion of the surgery.  A certified scrub tech was at the back table managing instruments and supplies for the surgical procedure.    Post operative plan  Sling at all times, ok to remove for hygiene  Follow up 1-2 weeks  X-ray at follow up 2 views AP and scapular y operative shoulder    Complications:  None; patient tolerated the procedure well.    Disposition: PACU - hemodynamically stable.  Condition: stable     Clint Henning PA-C was present for the entire case.  Given the nature of the procedure and disease process a skilled surgical assistant was necessary for the case.  The assistant was necessary for retraction and helped directly facilitate completion of the surgery.  A certified scrub tech was at the back table managing instruments and supplies for the surgical procedure.        Thomas Samson  Phone Number: [phone]

## 2025-06-17 NOTE — PERIOPERATIVE NURSING NOTE
Patient tolerating liquids without complaint of nausea or vomiting, VSS, pain is 2/10 states tolerable , and wishes to go home. Belongings returned, reviewed discharged instruction, exercises, follow up instruction, ultra sling use reviewed, questions answered with patient and family.

## 2025-06-17 NOTE — ANESTHESIA PROCEDURE NOTES
Peripheral Block    Patient location during procedure: holding area  Medication administered at: 6/17/2025 11:54 AM  End time: 6/17/2025 11:59 AM  Reason for block: at surgeon's request and post-op pain management  Staffing  Performed: attending   Authorized by: Dann Mora MD    Performed by: Dann Mora MD  Preanesthetic Checklist  Completed: patient identified, IV checked, site marked, risks and benefits discussed, surgical consent, monitors and equipment checked, pre-op evaluation and timeout performed   Timeout performed at: 6/17/2025 11:54 AM  Peripheral Block  Patient position: laying flat  Prep: ChloraPrep  Patient monitoring: heart rate, cardiac monitor and continuous pulse ox  Block type: interscalene  Laterality: left  Injection technique: single-shot  Guidance: ultrasound guided  Local infiltration: lidocaine  Infiltration strength: 1 %  Dose: 1 mL  Needle  Needle type: short-bevel   Needle gauge: 21 G  Needle length: 10 cm  Needle localization: ultrasound guidance     image stored in chart  Needle insertion depth: 2 cm  Assessment  Injection assessment: negative aspiration for heme, no paresthesia on injection, incremental injection and local visualized surrounding nerve on ultrasound  Paresthesia pain: none  Heart rate change: no  Slow fractionated injection: yes  Additional Notes  Risks, benefits, complications and alternatives discussed with patient.  Patient agrees to proceed with procedure.  Midazolam 5mg IV.  US guided, lateral IP approach.  Total of 20cc 0.5% ropivacaine with epi 1:200k and decadron 5mg injected in 3-5cc aliquots around plexus after negative aspirations.  No complications noted.    Medications Administered  midazolam (VERSED) IV - intravenous   5 mg - 6/17/2025 11:54:00 AM

## 2025-06-17 NOTE — ANESTHESIA PROCEDURE NOTES
Airway  Date/Time: 6/17/2025 12:42 PM  Reason: elective    Airway not difficult    Staffing  Performed: SRNA   Authorized by: Dann Mora MD    Performed by: RASHEEDA Sood-JOSÉ  Patient location during procedure: OR    Patient Condition  Indications for airway management: anesthesia  Patient position: sniffing  MILS maintained throughout  Planned trial extubation  Sedation level: deep     Final Airway Details   Preoxygenated: yes  Final airway type: endotracheal airway  Successful airway: ETT  Cuffed: yes   Successful intubation technique: direct laryngoscopy  Adjuncts used in placement: intubating stylet  Endotracheal tube insertion site: oral  Blade: Addi  Blade size: #4  ETT size (mm): 7.5  Cormack-Lehane Classification: grade I - full view of glottis  Placement verified by: chest auscultation and capnometry   Cuff volume (mL): 8  Measured from: teeth  ETT to teeth (cm): 24  Number of attempts at approach: 1

## 2025-06-19 ENCOUNTER — APPOINTMENT (OUTPATIENT)
Dept: PRIMARY CARE | Facility: CLINIC | Age: 65
End: 2025-06-19
Payer: MEDICARE

## 2025-06-24 ENCOUNTER — APPOINTMENT (OUTPATIENT)
Dept: CARDIOLOGY | Facility: CLINIC | Age: 65
End: 2025-06-24
Payer: MEDICARE

## 2025-06-24 DIAGNOSIS — K21.00 GASTROESOPHAGEAL REFLUX DISEASE WITH ESOPHAGITIS, UNSPECIFIED WHETHER HEMORRHAGE: ICD-10-CM

## 2025-06-24 DIAGNOSIS — Z79.4 TYPE 2 DIABETES MELLITUS WITHOUT COMPLICATION, WITH LONG-TERM CURRENT USE OF INSULIN: Primary | ICD-10-CM

## 2025-06-24 DIAGNOSIS — E11.9 TYPE 2 DIABETES MELLITUS WITHOUT COMPLICATION, WITH LONG-TERM CURRENT USE OF INSULIN: Primary | ICD-10-CM

## 2025-06-24 RX ORDER — GLIPIZIDE 5 MG/1
5 TABLET ORAL 2 TIMES DAILY
Qty: 180 TABLET | Refills: 3 | Status: SHIPPED | OUTPATIENT
Start: 2025-06-24

## 2025-06-24 RX ORDER — OMEPRAZOLE 40 MG/1
40 CAPSULE, DELAYED RELEASE ORAL
Qty: 90 CAPSULE | Refills: 3 | Status: SHIPPED | OUTPATIENT
Start: 2025-06-24

## 2025-06-25 ENCOUNTER — DOCUMENTATION (OUTPATIENT)
Dept: ORTHOPEDIC SURGERY | Facility: CLINIC | Age: 65
End: 2025-06-25
Payer: MEDICARE

## 2025-06-25 NOTE — PROGRESS NOTES
06/25/25  Recd approval from [facility] for retro auth on shoulder sling w/ pillow, .    Auth #[id number]  Retro 06/06/25

## 2025-07-01 ENCOUNTER — APPOINTMENT (OUTPATIENT)
Dept: PHARMACY | Facility: HOSPITAL | Age: 65
End: 2025-07-01
Payer: MEDICARE

## 2025-07-01 DIAGNOSIS — E11.9 TYPE 2 DIABETES MELLITUS WITHOUT COMPLICATION, WITH LONG-TERM CURRENT USE OF INSULIN: ICD-10-CM

## 2025-07-01 DIAGNOSIS — Z79.4 TYPE 2 DIABETES MELLITUS WITHOUT COMPLICATION, WITH LONG-TERM CURRENT USE OF INSULIN: ICD-10-CM

## 2025-07-01 RX ORDER — TIRZEPATIDE 5 MG/.5ML
5 INJECTION, SOLUTION SUBCUTANEOUS
Qty: 2 ML | Refills: 5 | Status: SHIPPED | OUTPATIENT
Start: 2025-07-01

## 2025-07-01 NOTE — PROGRESS NOTES
Subjective   Patient ID: Wan Dunlap is a 65 y.o. male who presents for Follow-up.    Diabetes  He presents for his follow-up diabetic visit. He has type 2 diabetes mellitus. His disease course has been improving. There are no hypoglycemic associated symptoms. There are no hypoglycemic complications. Symptoms are stable. There are no diabetic complications. Risk factors for coronary artery disease include diabetes mellitus. He is compliant with treatment all of the time. He is following a generally healthy diet. He participates in exercise intermittently. There is no change in his home blood glucose trend.     Uses freestyle jo-ann - BG stable and well controlled    Assessment/Plan   Problem List Items Addressed This Visit       Type 2 diabetes mellitus without complication, with long-term current use of insulin       LAB RESULTS:  Lab Results   Component Value Date    HGBA1C 6.3 (H) 06/09/2025    HGBA1C 7.8 (A) 03/04/2025    HGBA1C 9.3 (H) 09/13/2023     Lab Results   Component Value Date    LDLCALC 56 03/06/2025    CREATININE 1.18 06/09/2025      Lab Results   Component Value Date    CHOL 131 03/06/2025     Lab Results   Component Value Date    HDL 38 (L) 03/06/2025       Lab Results   Component Value Date    LDLCALC 56 03/06/2025     Lab Results   Component Value Date    TRIG 351 (H) 03/06/2025     Lab Results   Component Value Date    MPQRVLVC86 612 03/06/2025       Continue all meds under the continuation of care with the referring provider and clinical pharmacy team.    Patient stable and well controlled on current regimen  Continue Mounjaro 5 mg weekly  Consider removing ESPINAL in future.   12/15/25 @ 10:20a

## 2025-07-02 ENCOUNTER — HOSPITAL ENCOUNTER (OUTPATIENT)
Dept: RADIOLOGY | Facility: CLINIC | Age: 65
Discharge: HOME | End: 2025-07-02
Payer: MEDICARE

## 2025-07-02 ENCOUNTER — TELEPHONE (OUTPATIENT)
Dept: ORTHOPEDIC SURGERY | Facility: CLINIC | Age: 65
End: 2025-07-02

## 2025-07-02 ENCOUNTER — OFFICE VISIT (OUTPATIENT)
Dept: ORTHOPEDIC SURGERY | Facility: CLINIC | Age: 65
End: 2025-07-02
Payer: MEDICARE

## 2025-07-02 DIAGNOSIS — Z98.890 S/P LEFT ROTATOR CUFF REPAIR: Primary | ICD-10-CM

## 2025-07-02 DIAGNOSIS — M75.102 TEAR OF LEFT ROTATOR CUFF, UNSPECIFIED TEAR EXTENT, UNSPECIFIED WHETHER TRAUMATIC: ICD-10-CM

## 2025-07-02 DIAGNOSIS — Z98.890 S/P LEFT ROTATOR CUFF REPAIR: ICD-10-CM

## 2025-07-02 DIAGNOSIS — M75.21 BICEPS TENDINITIS ON RIGHT: ICD-10-CM

## 2025-07-02 PROCEDURE — 73030 X-RAY EXAM OF SHOULDER: CPT | Mod: LT

## 2025-07-02 PROCEDURE — 99024 POSTOP FOLLOW-UP VISIT: CPT | Performed by: STUDENT IN AN ORGANIZED HEALTH CARE EDUCATION/TRAINING PROGRAM

## 2025-07-02 PROCEDURE — 3044F HG A1C LEVEL LT 7.0%: CPT | Performed by: STUDENT IN AN ORGANIZED HEALTH CARE EDUCATION/TRAINING PROGRAM

## 2025-07-02 PROCEDURE — 4010F ACE/ARB THERAPY RXD/TAKEN: CPT | Performed by: STUDENT IN AN ORGANIZED HEALTH CARE EDUCATION/TRAINING PROGRAM

## 2025-07-02 PROCEDURE — 99212 OFFICE O/P EST SF 10 MIN: CPT | Performed by: STUDENT IN AN ORGANIZED HEALTH CARE EDUCATION/TRAINING PROGRAM

## 2025-07-02 RX ORDER — TRAMADOL HYDROCHLORIDE 50 MG/1
50 TABLET, FILM COATED ORAL EVERY 6 HOURS PRN
Qty: 21 TABLET | Refills: 0 | Status: SHIPPED | OUTPATIENT
Start: 2025-07-02 | End: 2025-07-09

## 2025-07-02 RX ORDER — TRAMADOL HYDROCHLORIDE 50 MG/1
50 TABLET, FILM COATED ORAL EVERY 6 HOURS PRN
Qty: 28 TABLET | Refills: 0 | Status: CANCELLED | OUTPATIENT
Start: 2025-07-02 | End: 2025-07-09

## 2025-07-02 RX ORDER — OXYCODONE AND ACETAMINOPHEN 5; 325 MG/1; MG/1
1 TABLET ORAL EVERY 6 HOURS PRN
Qty: 28 TABLET | Refills: 0 | Status: SHIPPED | OUTPATIENT
Start: 2025-07-02

## 2025-07-02 RX ORDER — OXYCODONE AND ACETAMINOPHEN 5; 325 MG/1; MG/1
1 TABLET ORAL EVERY 6 HOURS PRN
Qty: 28 TABLET | Refills: 0 | Status: CANCELLED | OUTPATIENT
Start: 2025-07-02

## 2025-07-02 NOTE — PROGRESS NOTES
Chief Complaint   Patient presents with    Left Shoulder - Post-op     [place] [facility] [place]         History of Present Illness:  Patient returns today after shoulder arthroscopy.  Denies any numbness tingling or shortness of breath.  Pain is appropriate for post-op course.  Patient presents today for incision check.        Physical Examination:  Mild swelling  Incisions healthy, no drainage or erythema  Tolerates gentle passive forward flexion  Neurovascular exam normal distally      Assessment:  Patient status post left shoulder arthroscopy with rotator cuff repair large to massive, biceps tenodesis    Plan:  Surgical details discussed.  Arthroscopic images reviewed.  Encouraged non-opioid pain medications.  Discussed sling wear and activity restrictions.  Discussed physical therapy protocol.  Follow-up ~ 1 month.

## 2025-07-30 ENCOUNTER — OFFICE VISIT (OUTPATIENT)
Dept: ORTHOPEDIC SURGERY | Facility: CLINIC | Age: 65
End: 2025-07-30
Payer: MEDICARE

## 2025-07-30 DIAGNOSIS — Z98.890 S/P LEFT ROTATOR CUFF REPAIR: ICD-10-CM

## 2025-07-30 PROCEDURE — 99212 OFFICE O/P EST SF 10 MIN: CPT | Performed by: STUDENT IN AN ORGANIZED HEALTH CARE EDUCATION/TRAINING PROGRAM

## 2025-07-30 PROCEDURE — 3051F HG A1C>EQUAL 7.0%<8.0%: CPT | Performed by: STUDENT IN AN ORGANIZED HEALTH CARE EDUCATION/TRAINING PROGRAM

## 2025-07-30 PROCEDURE — 4010F ACE/ARB THERAPY RXD/TAKEN: CPT | Performed by: STUDENT IN AN ORGANIZED HEALTH CARE EDUCATION/TRAINING PROGRAM

## 2025-07-30 PROCEDURE — 99024 POSTOP FOLLOW-UP VISIT: CPT | Performed by: STUDENT IN AN ORGANIZED HEALTH CARE EDUCATION/TRAINING PROGRAM

## 2025-07-30 NOTE — PROGRESS NOTES
Chief Complaint   Patient presents with    Left Shoulder - Post-op     RCR at [facility] [place] 6/17/2025         History of Present Illness:  Wan is a 65-year-old male presenting today for postop follow-up of left shoulder arthroscopy.  Patient returns today denying any numbness tingling or shortness of breath.  Pain is appropriate for post-op course.  He has been compliant with wearing sling and adhering to strict nonweightbearing status.        Physical Examination:  Mild swelling  Well-healed incision  Tolerates gentle passive forward flexion 0-95  Tolerates gentle abduction passive 0-90  Neurovascular exam normal distally      Assessment:  Patient status post left shoulder arthroscopy with rotator cuff repair large to massive, biceps tenodesis 6 weeks out    Plan:  Short and long-term implications of the shoulder discussed with Wan today  Encouraged non-opioid pain medications.  Discussed sling wear and activity restrictions.  Will discontinue sling today.  Discussed gradual transition  Discussed physical therapy protocol.  Will initiate PROM today.  Will advance to AAROM in 1 month  Follow-up ~6 weeks    Clint Henning PA-C

## 2025-08-05 ENCOUNTER — EVALUATION (OUTPATIENT)
Dept: PHYSICAL THERAPY | Facility: CLINIC | Age: 65
End: 2025-08-05
Payer: MEDICARE

## 2025-08-05 DIAGNOSIS — M75.122 COMPLETE TEAR OF LEFT ROTATOR CUFF, UNSPECIFIED WHETHER TRAUMATIC: Primary | ICD-10-CM

## 2025-08-05 PROCEDURE — 97535 SELF CARE MNGMENT TRAINING: CPT | Mod: GP | Performed by: PHYSICAL THERAPIST

## 2025-08-05 PROCEDURE — 97161 PT EVAL LOW COMPLEX 20 MIN: CPT | Mod: GP | Performed by: PHYSICAL THERAPIST

## 2025-08-05 PROCEDURE — 97110 THERAPEUTIC EXERCISES: CPT | Mod: GP | Performed by: PHYSICAL THERAPIST

## 2025-08-05 ASSESSMENT — PAIN - FUNCTIONAL ASSESSMENT: PAIN_FUNCTIONAL_ASSESSMENT: 0-10

## 2025-08-05 ASSESSMENT — PAIN DESCRIPTION - DESCRIPTORS: DESCRIPTORS: SHARP

## 2025-08-05 ASSESSMENT — PAIN SCALES - GENERAL: PAINLEVEL_OUTOF10: 6

## 2025-08-05 NOTE — LETTER
August 5, 2025    Markos Lombardi, PT  [address] [place], [address] [place] [address]    Patient: Wan Dunlap   YOB: 1960   Date of Visit: 8/5/2025       Dear Clint Henning PA-C  [address]   [facility], [address] [place] [address],  [place] [address]    The attached plan of care is being sent to you because your patient’s medical reimbursement requires that you certify the plan of care. Your signature is required to allow uninterrupted insurance coverage.      You may indicate your approval by signing below and faxing this form back to us at Dept Fax: [phone].    Please call Dept: [phone] with any questions or concerns.    Thank you for this referral,        Markos Lombardi, PT  [place] [address] [facility]  [address] [facility] [address]    Payer: Payor: [employer] / Plan: [employer] / Product Type: *No Product type* /                                                                         Date:     Dear Markos Lombardi, PT,     Re: Mr. Wan Dunlap, MRN:[id number]    I certify that I have reviewed the attached plan of care and it is medically necessary for Mr. Wan Dunlap (1960) who is under my care.          ______________________________________                    _________________  Provider name and credentials                                           Date and time                                                                                           Plan of Care 8/5/25   Effective from: 8/5/2025  Effective to: 11/3/2025    Plan ID: [id number]            Participants as of Finalize on 8/5/2025    Name Type Comments Contact Info    Clint Henning PA-C Referring Provider  [phone]    Markos Lombardi, PT Physical Therapist  [phone]       Last Plan Note     Author: Markos Lombardi PT Status: Incomplete Last edited: 8/5/2025  9:15 AM       PT Initial  Evaluation    Patient Name:  Wan Dunlap    MRN:  [id number]    :  1960    Today's Date:  25    Time Calculation  Start Time: 915  Stop Time: 955  Time Calculation (min): 40 min  PT Evaluation Time Entry  PT Evaluation (Low) Time Entry: 15  PT Therapeutic Procedures Time Entry  Therapeutic Exercise Time Entry: 8  Self-Care/Home Mgmt Training: 15       Informed Consent  Patient has been informed of all evaluation findings and treatment plans and agrees to participate in Physical Therapy services and plans as outlined.    Diagnosis:  Diagnosis and Precautions: Complete rotator cuff tear or rupture of left shoulder, not specified as traumatic; status post left shoulder arthroscopy with rotator cuff repair large to massive, biceps tenodesis 2025 Dr. Thomas Samson    Goals:   Problem: ADL [place]       Goal: Within 6 weeks, the patient will be able to dress upper body independently using adaptive strategies without exceeding current post-op precautions.          Problem: Pain Reduction       Goal: Within 4 weeks, the patient will report a decrease in left shoulder resting pain from 6/10 to <=3/10 on the Numeric Pain Rating Scale, to allow improved tolerance to daily activities such as dressing and bathing.         Problem: ROM       Goal: Within 4 weeks, the patient will improve left shoulder passive forward flexion from 110° to at least 140° to facilitate easier overhead reach for tasks like grooming and retrieving lightweight items from a shelf.         Problem: Sleep       Goal: Within 4 weeks, the patient will report a reduction in nighttime awakenings due to shoulder pain from 3+ times/night to <=1 time/night to promote healing and overall recovery.          Plan of Care:      Treatment/Interventions: Cryotherapy, Education/ Instruction, Electrical stimulation, Manual therapy, Neuromuscular re-education, Self care/ home management, Taping techniques, Therapeutic activities, Therapeutic  exercises, Ultrasound, Vasopneumatic device  PT Plan: Skilled PT  PT Frequency:  (1x/week until 8/27/25 then 1-2x/week x 8 visits)  Duration: 12 visits  Onset Date:  (surgery 6/17/25, injury 2 years ago)  Certification Period Start Date: 08/05/25  Certification Period End Date: 11/03/25  Number of Treatments Authorized: 12  Rehab Potential: Good  Plan of Care Agreement: Patient    PT Assessment:    Patient is a 65 y.o. MALE with c/o L shoulder pain.   Patient is alert and oriented x 3.  Patient presents with medical diagnosis of Complete rotator cuff tear or rupture of left shoulder, not specified as traumatic; status post left shoulder arthroscopy with rotator cuff repair large to massive, biceps tenodesis 6/17/2025 Dr. Thomas Samson  contributing to compensatory soft tissue dysfunction, pain, stiffness and weakness of the L shoulder.   Significant past medical history/past surgical history includes see below.    Skilled care is needed to progress the patient back to these activities without exacerbating symptoms.   Patient requires skilled PT services to address the problems identified and the individualized patient's goals as outlined in the problems and goals section of this evaluation.  A skilled PT is required to address these key impairments and to provide and progress with an appropriate home exercise program. Patient does have any significant PMH influencing Rx and reports motivation to return to FUNCTIONAL ACTIVITY and RETURN TO WORK.   Patient demonstrates to be a good candidate for physical therapy with good rehab potential and verbalized a good understanding of HIS diagnosis, prognosis and treatment.  Goals have been established and reviewed with the patient.  Patient requires insurance authorization after today.    PT Assessment Results: Decreased strength, Decreased range of motion, Decreased endurance, Decreased mobility, Orthopedic restrictions, Pain  Rehab Prognosis: Good  Evaluation/Treatment  Tolerance: Patient tolerated treatment well    Complexity:  Low complexity evaluation  due to a 15 minute duration, a past medical history WITH any personal factors and/or comorbidities that could impact the POC, examination of body systems completed on one to two elements, the patient presents with a stable condition,.  Prognosis:  Rehab Prognosis: Good    Problem List  Activity Limitations, Decreased Functional Level, Decreased knowledge of HEP, Flexibility, Pain, Range of Motion/joint mobility issues, Strength, Endurance, and Return to work    Impairments   IMPAIRED ROM UPPER BODY, IMPAIRED STRENGTH UPPER BODY, IMPAIRED CORE STABILITY, INCREASED PAIN, IMPAIRED FUNCTIONAL ACTIVITY LEVEL, and IMPAIRED FUNCTIONAL MOVEMENT PATTERNS    Functional Limitations:  LIMITATIONS PERFORMING BASIC ADL'S, ISSUES WITH SLEEP, WORK ISSUES, PARTICIPATION IN HOBBIES, PARTICIPATION IN LEISURE ACTIVITIES, and PARTICIPATION IN HOME MANAGEMENT    General Visit Information:  Reason for Referral: PT Evaluation  Referred By: Clint Henning PA-C  General Comment: Complete rotator cuff tear or rupture of left shoulder, not specified as traumatic;  status post left shoulder arthroscopy with rotator cuff repair large to massive, biceps tenodesis 6/17/2025 Dr. Thomas Samson    Pre-Cautions:  STEADI Fall Risk Score (The score of 4 or more indicates an increased risk of falling): 0     Medical Precautions:  (HTN, high cholesterol, diabetes, neuropathy, R Achilles tendon surgery 30 years ago, L knee surgery 20 years ago, L wrist surgery 40 years ago))  Post-Surgical Precautions:  (PROM only x 4 weeks then AAROM)    Protocol:  PROM only til 8/27/25 then AAROM    Reason for Visit:  PT Evaluate and Treat    Initial Evaluation:  Referred By: Clint Henning PA-C    Insurance  Insurance reviewed  Name of Insurance:  [employer]   Visit No.  1  * (EVAL- $10 COPAY - MUST PRIOR AUTH AFTER EVAL!!!) S/P LEFT RCR Z98.890; [employer]:  ALLOWED 20V CY MUST PRIOR AUTH AFTER INITIAL EVAL // 1100/2200 DED IS MET FOR 2025 // 7500/03486 OOP (559.55 REMAINS) // $10 COPAY // 30% COINSUR // [id number]     Subjective:    Current Episode  Date of Onset:  2 years ago  Mechanism of injury:  Moving furniture and missed a step and felt a pop in his L shoulder.  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    Pain Score:  Pain Assessment: 0-10  Pain Assessment  Pain Assessment: 0-10  0-10 (Numeric) Pain Score: 6 (0/10 best, 8/10 worst)  Pain Type: Chronic pain  Pain Location: Shoulder  Pain Orientation: Left  Pain Descriptors: Sharp  Pain Frequency: Intermittent  Pain Onset: Ongoing  Pain Type: Chronic pain  Pain Location: Shoulder  Pain Orientation: Left  Pain Descriptors: Sharp  Pain Frequency: Intermittent    Better with:  ice    Worse with:  reaching all planes, dressing, bathing, sleeping, putting on his socks, tucking his shirt in    Medical History/Surgical History:  Medical Precautions:  (HTN, high cholesterol, diabetes, neuropathy, R Achilles tendon surgery 30 years ago, L knee surgery 20 years ago, L wrist surgery 40 years ago)    Reviewed medical history form with patient (medications/allergies reviewed with patient).  Current Outpatient Medications   Medication Instructions   • atorvastatin (LIPITOR) 20 mg, oral, Daily   • blood-glucose sensor (FreeStyle Surjit 3 Plus Sensor) device Replace sensor every 15 days as directed   • fenofibrate (TRICOR) 48 mg, oral, Daily   • glipiZIDE (GLUCOTROL) 5 mg, oral, 2 times daily   • lisinopril 20 mg, oral, Daily   • Mounjaro 5 mg, subcutaneous, Once Weekly   • multivitamin tablet 1 tablet, Daily   • omega 3-dha-epa-fish oil (Fish OiL) 360-1,200 mg capsule Take by mouth.   • omeprazole (PRILOSEC) 40 mg, oral, Daily (0630)   • oxyCODONE-acetaminophen (Percocet) 5-325 mg tablet 1  tablet, oral, Every 6 hours PRN   • pregabalin (LYRICA) 200 mg, oral, 2 times daily   • tadalafil (CIALIS) 5 mg, oral, Daily     Radiology:  Exam Information    Status Exam Begun Exam Ended   Final 7/02/2025 13:57 7/02/2025 14:02     Study Result    Narrative & Impression   Interpreted By:  Gino Jean-Baptiste III,   STUDY:  XR SHOULDER LEFT 2+ VIEWS; ;  7/2/2025 2:02 pm      INDICATION:  Signs/Symptoms:pain.      ,Z98.890 Other specified postprocedural states      COMPARISON:  None.      ACCESSION NUMBER(S):  [id number]      ORDERING CLINICIAN:  GINO JEAN-BAPTISTE      FINDINGS:  Two views left shoulder: Status post biceps tenodesis appropriate  hardware placement. Also status post rotator cuff repair with  metallic anchor. No acute osseous abnormality.      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  Social Support/History:  LIVES WITH FAMILY    Objective:  Restrictions as per MD's Protocol if surgical:  PROM only until 8/27/25 and then AAROM    Skin:  L shoulder surgical portal incisions healing and closed, no active signs of infection.    Palpation:   mild point tenderness over L anterior shoulder    Sensation:  Patient denies numbness/tingling of bilateral UPPER extremities.    ROM:  PROM  L shoulder   Flexion 110 degrees with slight pain  Abduction  75 degrees with slight pain  ER 30 degrees  IR 38 degrees  Cervical spine AROM WNL's, R shoulder AROM WNL's, and R elbow AROM WNL's    Strength:    L shoulder strength not assessed  R shoulder 5/5 all planes and R elbow 5/5 all planes    Outcome measure   Other Measures  Disability of Arm Shoulder Hand (DASH): 44     Treatment  Time in clinic started at  9:15am  Time in clinic ended at   9:55am  Total time in clinic is . 40 minutes  Total timed code time is  38 minutes    Treatment Performed Today:.   PT Initial Evaluation, Therapeutic Exercise, and Self-Care/Home Management HEP  Individual(s) Educated: Patient  Education Provided: Home Exercise Program  Diagnosis and Precautions: Complete rotator cuff tear or rupture of left shoulder, not specified as traumatic; status post left shoulder arthroscopy with rotator cuff repair large to massive, biceps tenodesis 6/17/2025 Dr. Thomas Samson  Risk and Benefits Discussed with Patient/Caregiver/Other: yes  Patient/Caregiver Demonstrated Understanding: yes  Plan of Care Discussed and Agreed Upon: yes  Patient Response to Education: Patient/Caregiver Verbalized Understanding of Information, Patient/Caregiver Performed Return Demonstration of Exercises/Activities, Patient/Caregiver Asked Appropriate Questions    Patient instructed in a home exercise program, has been given handouts for each of the exercises performed and was given another sheet instructing patient in the amount of reps to perform and the fran to follow while doing the exercises    Access Code: [email or web address]  URL: [email or web address]  Date: 08/05/2025  Prepared by: Markos Lombardi    Exercises  - Horizontal Shoulder Pendulum with Table Support  - 1 x daily - 4-5 x weekly - 2 sets - 10 reps - 5-10 hold  - Flexion-Extension Shoulder Pendulum with Table Support  - 1 x daily - 4-5 x weekly - 2 sets - 10 reps - 5-10 hold  - Circular Shoulder Pendulum with Table Support  - 1 x daily - 4-5 x weekly - 2 sets - 10 reps - 5-10 hold         Current Participants as of 8/5/2025    Name Type Comments Contact Info    Clint Henning PA-C Referring Provider  [phone]    Signature pending    Markos Lombardi, PT Physical Therapist  [phone]    Signature pending

## 2025-08-05 NOTE — PROGRESS NOTES
PT Initial Evaluation    Patient Name:  Wan Dunlap    MRN:  [id number]    :  1960    Today's Date:  2025    Time Calculation  Start Time: 915  Stop Time: 955  Time Calculation (min): 40 min  PT Evaluation Time Entry  PT Evaluation (Low) Time Entry: 15  PT Therapeutic Procedures Time Entry  Therapeutic Exercise Time Entry: 8  Self-Care/Home Mgmt Training: 15       Informed Consent  Patient has been informed of all evaluation findings and treatment plans and agrees to participate in Physical Therapy services and plans as outlined.    Diagnosis:  Diagnosis and Precautions: Complete rotator cuff tear or rupture of left shoulder, not specified as traumatic; status post left shoulder arthroscopy with rotator cuff repair large to massive, biceps tenodesis 2025 Dr. Thomas Samson    Goals:   Problem: ADL [place]       Goal: Within 6 weeks, the patient will be able to dress upper body independently using adaptive strategies without exceeding current post-op precautions.          Problem: Pain Reduction       Goal: Within 4 weeks, the patient will report a decrease in left shoulder resting pain from 6/10 to <=3/10 on the Numeric Pain Rating Scale, to allow improved tolerance to daily activities such as dressing and bathing.         Problem: ROM       Goal: Within 4 weeks, the patient will improve left shoulder passive forward flexion from 110° to at least 140° to facilitate easier overhead reach for tasks like grooming and retrieving lightweight items from a shelf.         Problem: Sleep       Goal: Within 4 weeks, the patient will report a reduction in nighttime awakenings due to shoulder pain from 3+ times/night to <=1 time/night to promote healing and overall recovery.          Plan of Care:      Treatment/Interventions: Cryotherapy, Education/ Instruction, Electrical stimulation, Manual therapy, Neuromuscular re-education, Self care/ home management, Taping techniques, Therapeutic activities,  Therapeutic exercises, Ultrasound, Vasopneumatic device  PT Plan: Skilled PT  PT Frequency:  (1x/week until 8/27/25 then 1-2x/week x 8 visits)  Duration: 12 visits  Onset Date:  (surgery 6/17/25, injury 2 years ago)  Certification Period Start Date: 08/05/25  Certification Period End Date: 11/03/25  Number of Treatments Authorized: 12  Rehab Potential: Good  Plan of Care Agreement: Patient    PT Assessment:    Patient is a 65 y.o. MALE with c/o L shoulder pain.   Patient is alert and oriented x 3.  Patient presents with medical diagnosis of Complete rotator cuff tear or rupture of left shoulder, not specified as traumatic; status post left shoulder arthroscopy with rotator cuff repair large to massive, biceps tenodesis 6/17/2025 Dr. Thomas Samson  contributing to compensatory soft tissue dysfunction, pain, stiffness and weakness of the L shoulder.   Significant past medical history/past surgical history includes see below.    Skilled care is needed to progress the patient back to these activities without exacerbating symptoms.   Patient requires skilled PT services to address the problems identified and the individualized patient's goals as outlined in the problems and goals section of this evaluation.  A skilled PT is required to address these key impairments and to provide and progress with an appropriate home exercise program. Patient does have any significant PMH influencing Rx and reports motivation to return to FUNCTIONAL ACTIVITY and RETURN TO WORK.   Patient demonstrates to be a good candidate for physical therapy with good rehab potential and verbalized a good understanding of HIS diagnosis, prognosis and treatment.  Goals have been established and reviewed with the patient.  Patient requires insurance authorization after today.    PT Assessment Results: Decreased strength, Decreased range of motion, Decreased endurance, Decreased mobility, Orthopedic restrictions, Pain  Rehab Prognosis:  Good  Evaluation/Treatment Tolerance: Patient tolerated treatment well    Complexity:  Low complexity evaluation  due to a 15 minute duration, a past medical history WITH any personal factors and/or comorbidities that could impact the POC, examination of body systems completed on one to two elements, the patient presents with a stable condition,.  Prognosis:  Rehab Prognosis: Good    Problem List  Activity Limitations, Decreased Functional Level, Decreased knowledge of HEP, Flexibility, Pain, Range of Motion/joint mobility issues, Strength, Endurance, and Return to work    Impairments   IMPAIRED ROM UPPER BODY, IMPAIRED STRENGTH UPPER BODY, IMPAIRED CORE STABILITY, INCREASED PAIN, IMPAIRED FUNCTIONAL ACTIVITY LEVEL, and IMPAIRED FUNCTIONAL MOVEMENT PATTERNS    Functional Limitations:  LIMITATIONS PERFORMING BASIC ADL'S, ISSUES WITH SLEEP, WORK ISSUES, PARTICIPATION IN HOBBIES, PARTICIPATION IN LEISURE ACTIVITIES, and PARTICIPATION IN HOME MANAGEMENT    General Visit Information:  Reason for Referral: PT Evaluation  Referred By: Clint Henning PA-C  General Comment: Complete rotator cuff tear or rupture of left shoulder, not specified as traumatic;  status post left shoulder arthroscopy with rotator cuff repair large to massive, biceps tenodesis 6/17/2025 Dr. Thomas Samson    Pre-Cautions:  STEADI Fall Risk Score (The score of 4 or more indicates an increased risk of falling): 0     Medical Precautions:  (HTN, high cholesterol, diabetes, neuropathy, R Achilles tendon surgery 30 years ago, L knee surgery 20 years ago, L wrist surgery 40 years ago))  Post-Surgical Precautions:  (PROM only x 4 weeks then AAROM)    Protocol:  PROM only til 8/27/25 then AAROM    Reason for Visit:  PT Evaluate and Treat    Initial Evaluation:  Referred By: Clint Henning PA-C    Insurance  Insurance reviewed  Name of Insurance:  [employer]   Visit No.  1  * (EVAL- $10 COPAY - MUST PRIOR AUTH AFTER EVAL!!!) S/P LEFT RCR Z98.890;  [facility]: ALLOWED 20V CY MUST PRIOR AUTH AFTER INITIAL EVAL // 1100/2200 DED IS MET FOR 2025 // 7500/43322 OOP (559.55 REMAINS) // $10 COPAY // 30% COINSUR // [id number]     Subjective:    Current Episode  Date of Onset:  2 years ago  Mechanism of injury:  Moving furniture and missed a step and felt a pop in his L shoulder.  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    Pain Score:  Pain Assessment: 0-10  Pain Assessment  Pain Assessment: 0-10  0-10 (Numeric) Pain Score: 6 (0/10 best, 8/10 worst)  Pain Type: Chronic pain  Pain Location: Shoulder  Pain Orientation: Left  Pain Descriptors: Sharp  Pain Frequency: Intermittent  Pain Onset: Ongoing  Pain Type: Chronic pain  Pain Location: Shoulder  Pain Orientation: Left  Pain Descriptors: Sharp  Pain Frequency: Intermittent    Better with:  ice    Worse with:  reaching all planes, dressing, bathing, sleeping, putting on his socks, tucking his shirt in    Medical History/Surgical History:  Medical Precautions:  (HTN, high cholesterol, diabetes, neuropathy, R Achilles tendon surgery 30 years ago, L knee surgery 20 years ago, L wrist surgery 40 years ago)    Reviewed medical history form with patient (medications/allergies reviewed with patient).  Current Outpatient Medications   Medication Instructions    atorvastatin (LIPITOR) 20 mg, oral, Daily    blood-glucose sensor (FreeStyle Surjit 3 Plus Sensor) device Replace sensor every 15 days as directed    fenofibrate (TRICOR) 48 mg, oral, Daily    glipiZIDE (GLUCOTROL) 5 mg, oral, 2 times daily    lisinopril 20 mg, oral, Daily    Mounjaro 5 mg, subcutaneous, Once Weekly    multivitamin tablet 1 tablet, Daily    omega 3-dha-epa-fish oil (Fish OiL) 360-1,200 mg capsule Take by mouth.    omeprazole (PRILOSEC) 40 mg, oral, Daily (0630)    oxyCODONE-acetaminophen (Percocet) 5-325  mg tablet 1 tablet, oral, Every 6 hours PRN    pregabalin (LYRICA) 200 mg, oral, 2 times daily    tadalafil (CIALIS) 5 mg, oral, Daily     Radiology:  Exam Information    Status Exam Begun Exam Ended   Final 7/02/2025 13:57 7/02/2025 14:02     Study Result    Narrative & Impression   Interpreted By:  Gino Jean-Baptiste III,   STUDY:  XR SHOULDER LEFT 2+ VIEWS; ;  7/2/2025 2:02 pm      INDICATION:  Signs/Symptoms:pain.      ,Z98.890 Other specified postprocedural states      COMPARISON:  None.      ACCESSION NUMBER(S):  [id number]      ORDERING CLINICIAN:  GINO JEAN-BAPTISTE      FINDINGS:  Two views left shoulder: Status post biceps tenodesis appropriate  hardware placement. Also status post rotator cuff repair with  metallic anchor. No acute osseous abnormality.      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  Social Support/History:  LIVES WITH FAMILY    Objective:  Restrictions as per MD's Protocol if surgical:  PROM only until 8/27/25 and then AAROM    Skin:  L shoulder surgical portal incisions healing and closed, no active signs of infection.    Palpation:   mild point tenderness over L anterior shoulder    Sensation:  Patient denies numbness/tingling of bilateral UPPER extremities.    ROM:  PROM  L shoulder   Flexion 110 degrees with slight pain  Abduction  75 degrees with slight pain  ER 30 degrees  IR 38 degrees  Cervical spine AROM WNL's, R shoulder AROM WNL's, and R elbow AROM WNL's    Strength:    L shoulder strength not assessed  R shoulder 5/5 all planes and R elbow 5/5 all planes    Outcome measure   Other Measures  Disability of Arm Shoulder Hand (DASH): 44     Treatment  Time in clinic started at  9:15am  Time in clinic  ended at  9:55am  Total time in clinic is . 40 minutes  Total timed code time is  38 minutes    Treatment Performed Today:.   PT Initial Evaluation, Therapeutic Exercise, and Self-Care/Home Management HEP  Individual(s) Educated: Patient  Education Provided: Home Exercise Program  Diagnosis and Precautions: Complete rotator cuff tear or rupture of left shoulder, not specified as traumatic; status post left shoulder arthroscopy with rotator cuff repair large to massive, biceps tenodesis 6/17/2025 Dr. Thomas Samson  Risk and Benefits Discussed with Patient/Caregiver/Other: yes  Patient/Caregiver Demonstrated Understanding: yes  Plan of Care Discussed and Agreed Upon: yes  Patient Response to Education: Patient/Caregiver Verbalized Understanding of Information, Patient/Caregiver Performed Return Demonstration of Exercises/Activities, Patient/Caregiver Asked Appropriate Questions    Patient instructed in a home exercise program, has been given handouts for each of the exercises performed and was given another sheet instructing patient in the amount of reps to perform and the fran to follow while doing the exercises    Access Code: [email or web address]  URL: [email or web address]  Date: 08/05/2025  Prepared by: Markos Lombardi    Exercises  - Horizontal Shoulder Pendulum with Table Support  - 1 x daily - 4-5 x weekly - 2 sets - 10 reps - 5-10 hold  - Flexion-Extension Shoulder Pendulum with Table Support  - 1 x daily - 4-5 x weekly - 2 sets - 10 reps - 5-10 hold  - Circular Shoulder Pendulum with Table Support  - 1 x daily - 4-5 x weekly - 2 sets - 10 reps - 5-10 hold

## 2025-08-15 ENCOUNTER — TREATMENT (OUTPATIENT)
Dept: PHYSICAL THERAPY | Facility: CLINIC | Age: 65
End: 2025-08-15
Payer: MEDICARE

## 2025-08-15 DIAGNOSIS — M75.122 COMPLETE TEAR OF LEFT ROTATOR CUFF, UNSPECIFIED WHETHER TRAUMATIC: Primary | ICD-10-CM

## 2025-08-15 PROCEDURE — 97110 THERAPEUTIC EXERCISES: CPT | Mod: GP | Performed by: PHYSICAL THERAPIST

## 2025-08-21 ENCOUNTER — APPOINTMENT (OUTPATIENT)
Dept: UROLOGY | Facility: CLINIC | Age: 65
End: 2025-08-21
Payer: MEDICARE

## 2025-08-21 DIAGNOSIS — N52.9 ERECTILE DYSFUNCTION, UNSPECIFIED ERECTILE DYSFUNCTION TYPE: Primary | ICD-10-CM

## 2025-08-21 DIAGNOSIS — Z87.442 PERSONAL HISTORY OF KIDNEY STONES: ICD-10-CM

## 2025-08-21 DIAGNOSIS — I10 PRIMARY HYPERTENSION: ICD-10-CM

## 2025-08-21 PROCEDURE — 99213 OFFICE O/P EST LOW 20 MIN: CPT | Performed by: NURSE PRACTITIONER

## 2025-08-21 PROCEDURE — 1036F TOBACCO NON-USER: CPT | Performed by: NURSE PRACTITIONER

## 2025-08-21 PROCEDURE — 3051F HG A1C>EQUAL 7.0%<8.0%: CPT | Performed by: NURSE PRACTITIONER

## 2025-08-21 PROCEDURE — 4010F ACE/ARB THERAPY RXD/TAKEN: CPT | Performed by: NURSE PRACTITIONER

## 2025-08-21 RX ORDER — LISINOPRIL 20 MG/1
20 TABLET ORAL DAILY
Qty: 90 TABLET | Refills: 2 | Status: SHIPPED | OUTPATIENT
Start: 2025-08-21

## 2025-08-22 ENCOUNTER — APPOINTMENT (OUTPATIENT)
Dept: PHYSICAL THERAPY | Facility: CLINIC | Age: 65
End: 2025-08-22
Payer: MEDICARE

## 2025-08-22 DIAGNOSIS — M75.122 COMPLETE TEAR OF LEFT ROTATOR CUFF, UNSPECIFIED WHETHER TRAUMATIC: Primary | ICD-10-CM

## 2025-08-27 ENCOUNTER — TREATMENT (OUTPATIENT)
Dept: PHYSICAL THERAPY | Facility: CLINIC | Age: 65
End: 2025-08-27
Payer: MEDICARE

## 2025-08-27 DIAGNOSIS — M75.122 COMPLETE TEAR OF LEFT ROTATOR CUFF, UNSPECIFIED WHETHER TRAUMATIC: Primary | ICD-10-CM

## 2025-08-27 PROCEDURE — 97110 THERAPEUTIC EXERCISES: CPT | Mod: GP | Performed by: PHYSICAL THERAPIST

## 2025-09-02 ENCOUNTER — TREATMENT (OUTPATIENT)
Dept: PHYSICAL THERAPY | Facility: CLINIC | Age: 65
End: 2025-09-02
Payer: MEDICARE

## 2025-09-02 DIAGNOSIS — M75.122 COMPLETE TEAR OF LEFT ROTATOR CUFF, UNSPECIFIED WHETHER TRAUMATIC: Primary | ICD-10-CM

## 2025-09-02 PROCEDURE — 97110 THERAPEUTIC EXERCISES: CPT | Mod: GP,CQ

## 2025-09-05 ENCOUNTER — TREATMENT (OUTPATIENT)
Dept: PHYSICAL THERAPY | Facility: CLINIC | Age: 65
End: 2025-09-05
Payer: MEDICARE

## 2025-09-05 DIAGNOSIS — M75.122 COMPLETE TEAR OF LEFT ROTATOR CUFF, UNSPECIFIED WHETHER TRAUMATIC: Primary | ICD-10-CM

## 2025-09-05 PROCEDURE — 97110 THERAPEUTIC EXERCISES: CPT | Mod: GP | Performed by: PHYSICAL THERAPIST

## 2025-11-04 ENCOUNTER — APPOINTMENT (OUTPATIENT)
Facility: CLINIC | Age: 65
End: 2025-11-04
Payer: MEDICARE

## 2025-12-15 ENCOUNTER — APPOINTMENT (OUTPATIENT)
Dept: PHARMACY | Facility: HOSPITAL | Age: 65
End: 2025-12-15
Payer: MEDICARE

## 2026-02-17 ENCOUNTER — APPOINTMENT (OUTPATIENT)
Dept: UROLOGY | Facility: CLINIC | Age: 66
End: 2026-02-17
Payer: MEDICARE

## 2026-03-31 ENCOUNTER — APPOINTMENT (OUTPATIENT)
Dept: NEUROLOGY | Facility: CLINIC | Age: 66
End: 2026-03-31
Payer: MEDICARE

## (undated) DEVICE — TRAP POLYP BALEEN

## (undated) DEVICE — Device: Brand: ENDO SMARTCAP

## (undated) DEVICE — DRESSING, ABDOMINAL PAD, CURITY, 7.5 X 8 IN

## (undated) DEVICE — STRAP, ARM BOARD, 32 X 1.5

## (undated) DEVICE — SUTURE, VICRYL PLUS 3-0, SH, 27IN

## (undated) DEVICE — SUTURE, PROLENE, 0, 30 IN, FSLX, BLUE

## (undated) DEVICE — Device

## (undated) DEVICE — DRESSING, GAUZE, SPONGE, 12 PLY, 4 X 4 IN, PLASTIC POUCH, STRL 10PK

## (undated) DEVICE — CANNULA, GEMINI SR8

## (undated) DEVICE — SUTURE, ETHILON, 3-0, 18 IN, PS1, BLACK

## (undated) DEVICE — GLOVE, SURGICAL, PROTEXIS PI BLUE W/NEUTHERA, 8.0, PF, LF

## (undated) DEVICE — POSITIONER, CRADLE, HEAD, MEDC, FOAM SLOTTED

## (undated) DEVICE — APPLICATOR, CHLORAPREP, W/ORANGE TINT, 26ML

## (undated) DEVICE — CANNULA, ARTHROSCOPIC TWIST-IN 7MM

## (undated) DEVICE — BANDAGE, COFLEX, 6 X 5 YDS, FOAM TAN, STERILE, LF

## (undated) DEVICE — ADHESIVE, SKIN, DERMABOND ADVANCED, 15CM, PEN-STYLE

## (undated) DEVICE — BRUSH ENDO CLN L90.5IN SHTH DIA1.7MM BRIST DIA5-7MM 2-6MM

## (undated) DEVICE — SOLUTION, TOPICAL, ALCOHOL, ISOPROPYL 70%, 16 OZ

## (undated) DEVICE — PATIENT RETURN ELECTRODE, SINGLE-USE, NON CONTACT QUALITY MONITORING, ADULT, WITH 9 FT (2.7 M) CORD, FOR PATIENTS WEIGHING OVER 33LBS. (15KG): Brand: MEGADYNE

## (undated) DEVICE — STAR SLEEVE, COBAN, STRL

## (undated) DEVICE — TAPE, SURGICAL, FOAM, MICROFOAM, HYPOALLERGENIC, 4 IN X 5.5 YD

## (undated) DEVICE — BLADE, STRYKER, 4.0MM, RESECTOR, F-SERIES

## (undated) DEVICE — SNARE ENDOSCP AD L240CM LOOP W10MM SHTH DIA2.4MM RND INSUL

## (undated) DEVICE — TUBING, PATIENT 8FT STERILE

## (undated) DEVICE — MANIFOLD, 4 PORT NEPTUNE STANDARD

## (undated) DEVICE — PROTECTOR, NERVE, ULNAR, PINK

## (undated) DEVICE — TUBING, SUCTION, 6MM X 10, CLEAN N-COND

## (undated) DEVICE — BURR, STRYKER, 4.0MM, BRL 6FLT

## (undated) DEVICE — GLOVE, SURGICAL, PROTEXIS PI , 7.5, PF, LF

## (undated) DEVICE — FORCEPS BX L240CM JAW DIA2.8MM L CAP W/ NDL MIC MESH TOOTH

## (undated) DEVICE — PROBE, CRUISE ENERGY, ARTHOSCOPIC SERFAS 90-S 4.0MM

## (undated) DEVICE — CAUTERY, PENCIL, PUSH BUTTON, SMOKE EVAC, 70MM

## (undated) DEVICE — DRESSING, GAUZE, PETROLATUM, STRIP, XEROFORM, 1 X 8 IN, STERILE

## (undated) DEVICE — TUBING, SUCTION, 1/4" X 10', STRAIGHT: Brand: MEDLINE

## (undated) DEVICE — GOWN, SURGICAL, ROYAL SILK, LG, STERILE

## (undated) DEVICE — TUBE SET 96 MM 64 MM H2O PERISTALTIC STD AUX CHANNEL

## (undated) DEVICE — NEEDLE, SCORPION, HD

## (undated) DEVICE — STRAP, VELCRO, BODY, 4 X 60IN, NS

## (undated) DEVICE — MAT, FLOOR, STANDARD FLUID BARRIER, 32X44, GREEN

## (undated) DEVICE — SINGLE PORT MANIFOLD: Brand: NEPTUNE 2

## (undated) DEVICE — SUTURE, FIBERLINK P 2, 26IN BRAIDED POLYBLEND, BLUE

## (undated) DEVICE — SUTURE, MONOCRYL, 3-0, 18 IN, PS2, UNDYED